# Patient Record
Sex: FEMALE | Race: WHITE | NOT HISPANIC OR LATINO | Employment: UNEMPLOYED | URBAN - METROPOLITAN AREA
[De-identification: names, ages, dates, MRNs, and addresses within clinical notes are randomized per-mention and may not be internally consistent; named-entity substitution may affect disease eponyms.]

---

## 2017-02-02 ENCOUNTER — OFFICE VISIT (OUTPATIENT)
Dept: URGENT CARE | Facility: CLINIC | Age: 32
End: 2017-02-02
Payer: COMMERCIAL

## 2017-02-02 ENCOUNTER — HOSPITAL ENCOUNTER (OUTPATIENT)
Facility: MEDICAL CENTER | Age: 32
End: 2017-02-02
Attending: PHYSICIAN ASSISTANT
Payer: COMMERCIAL

## 2017-02-02 ENCOUNTER — TELEPHONE (OUTPATIENT)
Dept: MEDICAL GROUP | Age: 32
End: 2017-02-02

## 2017-02-02 VITALS
OXYGEN SATURATION: 98 % | TEMPERATURE: 98.8 F | HEART RATE: 69 BPM | WEIGHT: 128 LBS | HEIGHT: 63 IN | DIASTOLIC BLOOD PRESSURE: 68 MMHG | SYSTOLIC BLOOD PRESSURE: 102 MMHG | RESPIRATION RATE: 14 BRPM | BODY MASS INDEX: 22.68 KG/M2

## 2017-02-02 DIAGNOSIS — J02.9 VIRAL PHARYNGITIS: ICD-10-CM

## 2017-02-02 LAB
INT CON NEG: NEGATIVE
INT CON POS: POSITIVE
S PYO AG THROAT QL: NEGATIVE

## 2017-02-02 PROCEDURE — 99203 OFFICE O/P NEW LOW 30 MIN: CPT | Performed by: PHYSICIAN ASSISTANT

## 2017-02-02 PROCEDURE — 87880 STREP A ASSAY W/OPTIC: CPT | Performed by: PHYSICIAN ASSISTANT

## 2017-02-02 PROCEDURE — 87070 CULTURE OTHR SPECIMN AEROBIC: CPT

## 2017-02-02 RX ORDER — DIPHENHYDRAMINE HYDROCHLORIDE AND LIDOCAINE HYDROCHLORIDE AND ALUMINUM HYDROXIDE AND MAGNESIUM HYDRO
5 KIT EVERY 6 HOURS PRN
Qty: 100 ML | Refills: 0 | Status: SHIPPED | OUTPATIENT
Start: 2017-02-02 | End: 2017-09-07

## 2017-02-02 ASSESSMENT — ENCOUNTER SYMPTOMS
SORE THROAT: 1
TROUBLE SWALLOWING: 1
FEVER: 0
CHILLS: 0
SPUTUM PRODUCTION: 0
SHORTNESS OF BREATH: 0
COUGH: 0
MUSCULOSKELETAL NEGATIVE: 1
DIZZINESS: 0
SWOLLEN GLANDS: 1
NAUSEA: 0
VOMITING: 0
DIARRHEA: 0
ABDOMINAL PAIN: 0

## 2017-02-02 NOTE — PROGRESS NOTES
"Subjective:      Cinthia Ta is a 31 y.o. female who presents with Sore Throat            Pharyngitis   This is a new problem. The current episode started in the past 7 days (2 days). The problem has been unchanged. The pain is worse on the right side. There has been no fever. The pain is at a severity of 3/10. The pain is mild. Associated symptoms include swollen glands and trouble swallowing. Pertinent negatives include no abdominal pain, congestion, coughing, diarrhea, ear pain, shortness of breath or vomiting. She has had no exposure to strep or mono. She has tried nothing for the symptoms.   Denies history of frequent strep infections. No sick contacts.    Review of Systems   Constitutional: Negative for fever and chills.   HENT: Positive for sore throat and trouble swallowing. Negative for congestion and ear pain.    Respiratory: Negative for cough, sputum production and shortness of breath.    Cardiovascular: Negative for chest pain.   Gastrointestinal: Negative for nausea, vomiting, abdominal pain and diarrhea.   Genitourinary: Negative.    Musculoskeletal: Negative.    Neurological: Negative for dizziness.          Objective:     /68 mmHg  Pulse 69  Temp(Src) 37.1 °C (98.8 °F)  Resp 14  Ht 1.6 m (5' 2.99\")  Wt 58.06 kg (128 lb)  BMI 22.68 kg/m2  SpO2 98%     Physical Exam   Constitutional: She is oriented to person, place, and time. She appears well-developed and well-nourished. No distress.   HENT:   Head: Normocephalic and atraumatic.   Right Ear: Hearing, tympanic membrane, external ear and ear canal normal.   Left Ear: Hearing, tympanic membrane, external ear and ear canal normal.   Nose: Nose normal.   Mouth/Throat: Posterior oropharyngeal edema and posterior oropharyngeal erythema present. No oropharyngeal exudate or tonsillar abscesses.       Elevation of buccal mucosa present on hard palate with small blister noted.   Eyes: Conjunctivae are normal. Pupils are equal, round, and " reactive to light. Right eye exhibits no discharge. Left eye exhibits no discharge.   Neck: Normal range of motion.   Cardiovascular: Normal rate, regular rhythm and normal heart sounds.    No murmur heard.  Pulmonary/Chest: Effort normal and breath sounds normal. No respiratory distress. She has no wheezes. She has no rales.   Musculoskeletal: Normal range of motion.   Lymphadenopathy:     She has cervical adenopathy.   Neurological: She is alert and oriented to person, place, and time.   Skin: Skin is warm and dry. She is not diaphoretic.   Psychiatric: She has a normal mood and affect. Her behavior is normal.   Nursing note and vitals reviewed.         Medications, Allergies, and current problem list reviewed today in Epic       Assessment/Plan:     1. Viral pharyngitis  - POCT Rapid Strep A: NEGATIVE  - DPH-Lido-AlHydr-MgHydr-Simeth (MAGIC MOUTHWASH BLM) Suspension; Take 5 mL by mouth every 6 hours as needed.  Dispense: 100 mL; Refill: 0  - CULTURE THROAT; Future  - Recommend supportive care  - Will treat pending throat culture    CULTURE THROAT   Status: Final result  Visible to patient:  Not Released  Dx:  Viral pharyngitis                5d ago       Significant Indicator NEG     Source THRT     Upper Respiratory Culture, Res Moderate growth usual upper respiratory josefa   No group A beta Streptococcus isolated.           Resulting Agency M          Specimen Collected: 02/02/17  9:26 AM     Last Resulted: 02/04/17  3:15 PM

## 2017-02-02 NOTE — Clinical Note
February 2, 2017         Patient: Cinthia Ta   YOB: 1985   Date of Visit: 2/2/2017           To Whom it May Concern:    Cinthia Ta was seen in my clinic on 2/2/2017. Please excuse her absence from 2/2/17-2/3/17.    If you have any questions or concerns, please don't hesitate to call.        Sincerely,           Luly Hamilton PA-C  Electronically Signed

## 2017-02-02 NOTE — MR AVS SNAPSHOT
"        Cinthia Cid Keyon   2017 9:00 AM   Office Visit   MRN: [id number]    Department:  [facility]   Dept Phone:  [phone]    Description:  Female : 1985   Provider:  Luly Hamilton PA-C           Reason for Visit     Sore Throat x a couple of days      Allergies as of 2017     No Known Allergies      You were diagnosed with     Viral pharyngitis   [217102]         Vital Signs     Blood Pressure Pulse Temperature Respirations Height Weight    102/68 mmHg 69 37.1 °C (98.8 °F) 14 1.6 m (5' 2.99\") 58.06 kg (128 lb)    Body Mass Index Oxygen Saturation                22.68 kg/m2 98%          Basic Information     Date Of Birth Sex Race Ethnicity Preferred Language    1985 Female White Non- English      Your appointments     2017  3:00 PM   New Patient with Jada Black M.D.   [address] [id number] [phone]           Please bring Photo ID, Insurance Cards, All Medication Bottles and copies of any legal documents (such as Living Will, Power of ) If speaking a language besides English please bring an adult . Please arrive 30 minutes prior for check in and registration. You will be receiving a confirmation call a few days before your appointment from our automated call confirmation system.              Health Maintenance     Patient has no pending health maintenance at this time      Results     POCT Rapid Strep A      Component    Rapid Strep Screen    Negative    Internal Control Positive    Positive    Internal Control Negative    Negative                        Current Immunizations     No immunizations on file.      Below and/or attached are the medications your provider expects you to take. Review all of your home medications and newly ordered medications with your provider and/or pharmacist. Follow medication instructions as directed by your provider and/or pharmacist. Please keep " your medication list with you and share with your provider. Update the information when medications are discontinued, doses are changed, or new medications (including over-the-counter products) are added; and carry medication information at all times in the event of emergency situations     Allergies:  No Known Allergies          Medications  Valid as of: February 02, 2017 -  9:57 AM    Generic Name Brand Name Tablet Size Instructions for use    DPH-Lido-AlHydr-MgHydr-Simeth (Suspension) MAGIC MOUTHWASH BLM  Take 5 mL by mouth every 6 hours as needed.        .                 Medicines prescribed today were sent to:     [facility], [place] - [address] [facility] [facility] [place]    [address] [address]    Phone: [phone] Fax: [phone]    Open 24 Hours?: No      Medication refill instructions:       If your prescription bottle indicates you have medication refills left, it is not necessary to call your provider’s office. Please contact your pharmacy and they will refill your medication.    If your prescription bottle indicates you do not have any refills left, you may request refills at any time through one of the following ways: The online [employer] system (except Urgent Care), by calling your provider’s office, or by asking your pharmacy to contact your provider’s office with a refill request. Medication refills are processed only during regular business hours and may not be available until the next business day. Your provider may request additional information or to have a follow-up visit with you prior to refilling your medication.   *Please Note: Medication refills are assigned a new Rx number when refilled electronically. Your pharmacy may indicate that no refills were authorized even though a new prescription for the same medication is available at the pharmacy. Please request the medicine by name with the pharmacy before contacting your provider for a  refill.        Your To Do List     Future Labs/Procedures Complete By Expires    CULTURE THROAT  As directed 2/2/2018         [employer] Access Code: Activation code not generated  Current [employer] Status: Active

## 2017-02-02 NOTE — TELEPHONE ENCOUNTER
Phone Number Called: [phone] (home)     Message: Patient called wanting to know if Dr. Andrade is accepting New patient and I returned the called and left message stating to patient that Dr. Andrade is not accepting New Patients.    Left Message for patient to call back: yes

## 2017-02-04 LAB
BACTERIA SPEC RESP CULT: NORMAL
SIGNIFICANT IND 70042: NORMAL
SOURCE SOURCE: NORMAL

## 2017-02-07 ENCOUNTER — TELEPHONE (OUTPATIENT)
Dept: URGENT CARE | Facility: CLINIC | Age: 32
End: 2017-02-07

## 2017-02-07 NOTE — TELEPHONE ENCOUNTER
Called patient to inform her of negative throat culture. She reports her sore throat has resolved, but she is still having soreness of the roof of her mouth. She is set up to see a new PCP in 2 days and will follow up with them for re-evaluation.

## 2017-02-09 ENCOUNTER — OFFICE VISIT (OUTPATIENT)
Dept: MEDICAL GROUP | Age: 32
End: 2017-02-09
Payer: COMMERCIAL

## 2017-02-09 VITALS
HEIGHT: 63 IN | TEMPERATURE: 98.6 F | HEART RATE: 82 BPM | OXYGEN SATURATION: 98 % | WEIGHT: 127 LBS | BODY MASS INDEX: 22.5 KG/M2 | DIASTOLIC BLOOD PRESSURE: 84 MMHG | SYSTOLIC BLOOD PRESSURE: 112 MMHG

## 2017-02-09 DIAGNOSIS — F41.1 GAD (GENERALIZED ANXIETY DISORDER): ICD-10-CM

## 2017-02-09 DIAGNOSIS — Z30.41 ENCOUNTER FOR SURVEILLANCE OF CONTRACEPTIVE PILLS: ICD-10-CM

## 2017-02-09 DIAGNOSIS — F45.8 BRUXISM (TEETH GRINDING): ICD-10-CM

## 2017-02-09 DIAGNOSIS — F41.8 SITUATIONAL ANXIETY: ICD-10-CM

## 2017-02-09 DIAGNOSIS — R19.8 CLENCHING OF TEETH: ICD-10-CM

## 2017-02-09 DIAGNOSIS — Z87.42 HX OF ABNORMAL CERVICAL PAP SMEAR: ICD-10-CM

## 2017-02-09 PROCEDURE — 99214 OFFICE O/P EST MOD 30 MIN: CPT | Performed by: FAMILY MEDICINE

## 2017-02-09 RX ORDER — PROPRANOLOL HYDROCHLORIDE 20 MG/1
20 TABLET ORAL 3 TIMES DAILY
Qty: 90 TAB | Refills: 0 | Status: SHIPPED | OUTPATIENT
Start: 2017-02-09 | End: 2017-02-09 | Stop reason: SDUPTHER

## 2017-02-09 RX ORDER — CYCLOBENZAPRINE HCL 5 MG
5 TABLET ORAL
Qty: 30 TAB | Refills: 0 | Status: SHIPPED | OUTPATIENT
Start: 2017-02-09 | End: 2017-03-07 | Stop reason: SDUPTHER

## 2017-02-09 RX ORDER — PROPRANOLOL HYDROCHLORIDE 20 MG/1
20 TABLET ORAL
Qty: 90 TAB | Refills: 0 | Status: SHIPPED | OUTPATIENT
Start: 2017-02-09 | End: 2017-09-07

## 2017-02-09 RX ORDER — ALPRAZOLAM 0.25 MG/1
0.25 TABLET ORAL
Qty: 30 TAB | Refills: 0 | Status: SHIPPED | OUTPATIENT
Start: 2017-02-09 | End: 2017-05-01 | Stop reason: SDUPTHER

## 2017-02-09 RX ORDER — ALPRAZOLAM 0.25 MG/1
0.25 TABLET ORAL
COMMUNITY
End: 2017-02-09 | Stop reason: SDUPTHER

## 2017-02-09 ASSESSMENT — PATIENT HEALTH QUESTIONNAIRE - PHQ9: CLINICAL INTERPRETATION OF PHQ2 SCORE: 0

## 2017-02-09 NOTE — MR AVS SNAPSHOT
"        Cinthia Ta   2017 3:00 PM   Office Visit   MRN: [id number]    Department:  [address] [place] [address]   Dept Phone:  [phone]    Description:  Female : 1985   Provider:  Jada Black M.D.           Reason for Visit     Establish Care puss on roof of mouth went to  and still not feeling better       Allergies as of 2017     No Known Allergies      You were diagnosed with     Encounter for surveillance of contraceptive pills   [526042]       JUAN MIGUEL (generalized anxiety disorder)   [991844]       Situational anxiety   [124969]       Clenching of teeth   [0249559]       Bruxism (teeth grinding)   [370949]       Hx of abnormal cervical Pap smear   [135370]         Vital Signs     Blood Pressure Pulse Temperature Height Weight Body Mass Index    112/84 mmHg 82 37 °C (98.6 °F) 1.6 m (5' 2.99\") 57.607 kg (127 lb) 22.50 kg/m2    Oxygen Saturation Last Menstrual Period Breastfeeding? Smoking Status          98% 2017 No Never Smoker         Basic Information     Date Of Birth Sex Race Ethnicity Preferred Language    1985 Female White Non- English      Your appointments     May 09, 2017  4:00 PM   Established Patient with Jada Black M.D.   [address] [address] [phone]           You will be receiving a confirmation call a few days before your appointment from our automated call confirmation system.              Problem List              ICD-10-CM Priority Class Noted - Resolved    Encounter for surveillance of contraceptive pills Z30.41   2017 - Present    JUAN MIGUEL (generalized anxiety disorder) F41.1   2017 - Present    Situational anxiety F41.8   2017 - Present    Clenching of teeth R19.8   2017 - Present    Bruxism (teeth grinding) F45.8   2017 - Present    Hx of abnormal cervical Pap smear Z87.898   2017 - Present      Health Maintenance        Date Due Completion Dates    IMM DTaP/Tdap/Td " Vaccine (1 - Tdap) 7/18/2005 ---    PAP SMEAR 7/18/2007 ---    IMM INFLUENZA (1) 9/1/2016 ---            Current Immunizations     No immunizations on file.      Below and/or attached are the medications your provider expects you to take. Review all of your home medications and newly ordered medications with your provider and/or pharmacist. Follow medication instructions as directed by your provider and/or pharmacist. Please keep your medication list with you and share with your provider. Update the information when medications are discontinued, doses are changed, or new medications (including over-the-counter products) are added; and carry medication information at all times in the event of emergency situations     Allergies:  No Known Allergies          Medications  Valid as of: February 09, 2017 -  3:51 PM    Generic Name Brand Name Tablet Size Instructions for use    ALPRAZolam (Tab) XANAX 0.25 MG Take 1 Tab by mouth 1 time daily as needed for Anxiety.        Cyclobenzaprine HCl (Tab) FLEXERIL 5 MG Take 1 Tab by mouth every bedtime.        DPH-Lido-AlHydr-MgHydr-Simeth (Suspension) MAGIC MOUTHWASH BLM  Take 5 mL by mouth every 6 hours as needed.        Propranolol HCl (Tab) INDERAL 20 MG Take 1 Tab by mouth 1 time daily as needed.        .                 Medicines prescribed today were sent to:     [id number] - [place] - [address] [facility] [address]    [address] [place] [address]    Phone: [phone] Fax: [phone]    Open 24 Hours?: No      Medication refill instructions:       If your prescription bottle indicates you have medication refills left, it is not necessary to call your provider’s office. Please contact your pharmacy and they will refill your medication.    If your prescription bottle indicates you do not have any refills left, you may request refills at any time through one of the following ways: The online [employer] system (except Urgent  Care), by calling your provider’s office, or by asking your pharmacy to contact your provider’s office with a refill request. Medication refills are processed only during regular business hours and may not be available until the next business day. Your provider may request additional information or to have a follow-up visit with you prior to refilling your medication.   *Please Note: Medication refills are assigned a new Rx number when refilled electronically. Your pharmacy may indicate that no refills were authorized even though a new prescription for the same medication is available at the pharmacy. Please request the medicine by name with the pharmacy before contacting your provider for a refill.           [employer] Access Code: Activation code not generated  Current [employer] Status: Active

## 2017-02-09 NOTE — PROGRESS NOTES
Subjective:   CC: establish care, JUAN MIGUEL    HPI:     Cinthia Ta is a 31 y.o. female, established patient of the clinic, presents with the following concerns:     1. Encounter for surveillance of contraceptive pills  Sexually active, , current engaged to be  in 10/2017.   Using OCPs for contraception for the past 15 years.   Denies hx of tobacco abuse, CVD, CVA, clotting disorders.   Does not remember the name of her OCPs.     2. JUAN MIGUEL (generalized anxiety disorder)  Chronic, in remission, except for occasional situational anxiety.   Used to take Xanax for anxiety, but has not needed Xanax for over a year.     3. Situational anxiety  C/o flushing, chest palpitation, and diaphoresis with public speaking in front of a large crowd, which interferering with her employment. 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     4. Clenching of teeth  5. Bruxism (teeth grinding)  Hx of nocturnal bruxism and clenching of the teeth secondary to anxiety.   Pt was seen by dentist, who plans to create a mouth guard for patient.   Pt's dentist prescribed Flexeril to relax jaw muscles at night, which appears to work well for patient.   She's interested in continuing Flexeril as it does help with her symptoms.     6. Hx of abnormal cervical Pap smear  Hx of abnormal pap smear in  s/p colposcopy, which was negative.   Subsequent pap has been normal.     7. Oral Ulcer:   Pt develops a small oral ulcer on the roof of her mouth couple days ago.   She went to urgent care and was told to use OTC mouth wash.   The lesion appears to be healing well.    Current medicines (including changes today)  Current Outpatient Prescriptions   Medication Sig Dispense Refill   • alprazolam (XANAX) 0.25 MG Tab Take 1 Tab by mouth 1 time daily as needed for Anxiety. 30 Tab 0   • cyclobenzaprine (FLEXERIL) 5 MG tablet Take 1 Tab by mouth every bedtime. 30 Tab 0   • propranolol (INDERAL) 20 MG Tab Take 1 Tab by mouth 1 time daily as needed.  "90 Tab 0   • DPH-Lido-AlHydr-MgHydr-Simeth (MAGIC MOUTHWASH BLM) Suspension Take 5 mL by mouth every 6 hours as needed. 100 mL 0     No current facility-administered medications for this visit.     She  has no past medical history on file.    I personally reviewed patient's problem list, allergies, medications, family hx, social hx with patient and update EPIC.     REVIEW OF SYSTEMS:  CONSTITUTIONAL:  Denies night sweats, fatigue, malaise, lethargy, fever or chills.  RESPIRATORY:  Denies cough, wheeze, hemoptysis, or shortness of breath.  CARDIOVASCULAR:  Denies chest pains, palpitations, pedal edema  GASTROINTESTINAL:  Denies abdominal pain, nausea or vomiting, diarrhea, constipation, hematemesis, hematochezia, melena.     Objective:     Blood pressure 112/84, pulse 82, temperature 37 °C (98.6 °F), height 1.6 m (5' 2.99\"), weight 57.607 kg (127 lb), last menstrual period 02/05/2017, SpO2 98 %, not currently breastfeeding. Body mass index is 22.5 kg/(m^2).    Physical Exam:  Constitutional: awake, alert, in no distress.  Skin: Warm, dry, good turgor, no rashes, bruises, ulcers in visible areas.  Eye: conjunctiva clear, lids neg for edema or lesions.  ENMT: TM and auditory canals wnl. Oral and nasal mucosa wnl. Lips without lesions, good dentition, oropharynx clear.  - 0.2 x 1 cm superficial ulceration on the hard palate, neg exudate. The rest of oral mucosa exam was normal.   Neck: Trachea midline, no masses, no thyromegaly. No cervical or supraclavicular lymphadenopathy  Respiratory: Unlabored respiratory effort, lungs clear to auscultation, no wheezes, no ronchi.  Cardiovascular: Normal S1, S2, no murmur, no pedal edema.  Abdomen: Soft, non-tender to palpation, no hernia, no hepatosplenomegaly.  Neuro: CN2-12 grossly intact, no sensory deficits.   Psych: Oriented x3, affect and mood wnl, intact judgement and insight.       Assessment and Plan:   The following treatment plan was discussed    1. Encounter for " surveillance of contraceptive pills  Sexually active, , current engaged to be  in 10/2017.   Using OCPs for contraception for the past 15 years.   Denies hx of tobacco abuse, CVD, CVA, clotting disorders.   Does not remember the name of her OCPs. Plan:   - continue OCP, call for refill    2. JUAN MIGUEL (generalized anxiety disorder)  Chronic, used to take Xanax for anxiety flares, tried Prozac in the past but ineffective, currently in remission, has not needed Xanax for 12+ months. Plan:   - alprazolam (XANAX) 0.25 MG Tab; Take 1 Tab by mouth 1 time daily as needed for Anxiety.  Dispense: 30 Tab; Refill: 0    3. Situational anxiety  C/o facial/chest flushing, chest palpitation, diaphoresis prior to public speaking, which interfering with her job. Plan:   - propranolol (INDERAL) 20 MG Tab; Take 1 Tab by mouth 1 time daily as needed.  Dispense: 90 Tab; Refill: 0  - f/u PRN    4. Clenching of teeth  5. Bruxism (teeth grinding)  Hx of nocturnal bruxism and clenching of the teeth secondary to anxiety.   Pt was seen by dentist, who plans to create a mouth guard for patient.   Pt's dentist prescribed Flexeril to relax jaw muscles at night, which appears to work well for patient.   She's interested in continuing Flexeril as it does help with her symptoms. Plan:   - cyclobenzaprine (FLEXERIL) 5 MG tablet; Take 1 Tab by mouth every bedtime.  Dispense: 30 Tab; Refill: 0  - side effects discussed with patient.     6. Hx of abnormal cervical Pap smear  Hx of abnormal pap smear in  s/p colposcopy, which was negative.   Subsequent pap has been normal.  Plan: will request records from previous PCP    7. Aphthous ulcer  Exam noted for small superficial ulcer on the hard palate, appears to heal well according to patient. Plan:   - Orajel  - mouth wash  - f/u as needed.     Jada Black M.D.      Followup: Return in about 3 months (around 2017) for Long, Multiple issues.

## 2017-02-09 NOTE — Clinical Note
[facility]  Jada Black M.D.  [address] [place] [address]  Fax: [phone] Authorization for Release/Disclosure of Protected Health Information   Name: EMMETT TA : 1985 SSN: [id number]   Address: [address] [place] [address] [address] [place] [address] Phone:    [phone] (home)    I authorize the entity listed below to release/disclose the PHI below to [facility]/Jada Black M.D.   Provider or Entity Name:    Dr. Gamble    Address   City, [facility] Phone:      Fax:     Reason for request: continuity of care   Information to be released:    [  ] LAST COLONOSCOPY, including any PATH REPORT [  ] LAST DEXA  [  ] LAST MAMMOGRAM  [  ] LAST PAP [  ] RETINA EXAM REPORT  [  ] IMMUNIZATION RECORDS  [ x ] Release all info      [  ] Check here and initial the line next to each item to release ALL health information INCLUDING  _____ Care and treatment for drug and / or alcohol abuse  _____ HIV testing, infection status, or AIDS  _____ Genetic Testing    DATES OF SERVICE OR TIME PERIOD TO BE DISCLOSED: _____________  I understand and acknowledge that:  * This Authorization may be revoked at any time by you in writing, except if your health information has already been used or disclosed.  * Your health information that will be used or disclosed as a result of you signing this authorization could be re-disclosed by the recipient. If this occurs, your re-disclosed health information may no longer be protected by State or Federal laws.  * You may refuse to sign this Authorization. Your refusal will not affect your ability to obtain treatment.  * This Authorization becomes effective upon signing and will  on (date) __________. If no date is indicated, this Authorization will  one (1) year from the signature date.    Name: Emmett Ta    Signature:     Date: 2017

## 2017-03-07 RX ORDER — CYCLOBENZAPRINE HCL 5 MG
TABLET ORAL
Qty: 30 TAB | Refills: 0 | Status: SHIPPED | OUTPATIENT
Start: 2017-03-07 | End: 2017-05-01 | Stop reason: SDUPTHER

## 2017-05-01 DIAGNOSIS — F41.1 GAD (GENERALIZED ANXIETY DISORDER): ICD-10-CM

## 2017-05-01 RX ORDER — CYCLOBENZAPRINE HCL 5 MG
TABLET ORAL
Qty: 30 TAB | Refills: 0 | Status: CANCELLED | OUTPATIENT
Start: 2017-05-01

## 2017-05-01 NOTE — TELEPHONE ENCOUNTER
Phone Number Called: [phone] (home)     Message: returning pt vm regarding refills.  Left message for patient to return call.    Left Message for patient to call back: yes

## 2017-05-01 NOTE — TELEPHONE ENCOUNTER
----- Message from Kaylene Thakur sent at 4/26/2017  3:11 PM PDT -----  Pt called office has an appt schedule 5/9/17, pt doesn't need to see dr she is feeling fine, does pt still need to come in, pt does need to get refill on muscle relaxer  called into pharmacy. Please call pt and let her know if she needs to come in  Thank you

## 2017-05-01 NOTE — TELEPHONE ENCOUNTER
Was the patient seen in the last year in this department? Yes     Does patient have an active prescription for medications requested? No     Received Request Via: Patient

## 2017-05-02 RX ORDER — CYCLOBENZAPRINE HCL 5 MG
TABLET ORAL
Qty: 30 TAB | Refills: 0 | Status: SHIPPED | OUTPATIENT
Start: 2017-05-02 | End: 2017-08-26 | Stop reason: SDUPTHER

## 2017-05-02 RX ORDER — ALPRAZOLAM 0.25 MG/1
0.25 TABLET ORAL
Qty: 30 TAB | Refills: 0 | Status: SHIPPED
Start: 2017-05-02 | End: 2017-08-26 | Stop reason: SDUPTHER

## 2017-05-02 NOTE — TELEPHONE ENCOUNTER
Phone Number Called: [phone] (home)     Message: called pt and notified of message below, pt will contact previous doctor to get a copy of papsmear before scheduling appt.     Left Message for patient to call back: no

## 2017-05-02 NOTE — TELEPHONE ENCOUNTER
rx faxed to   [facility] [place] [address] - [place] - [address]  [address] [place] [address]  Phone: [phone] Fax: [phone]

## 2017-05-02 NOTE — TELEPHONE ENCOUNTER
Refill approved, Rx sent electronically to pharmacy.   We requested records from her previous PCP since 2/16/2017, but did not receive anything. Therefore, I recommend scheduling an appointment for repeat pap smear.   Please inform patient.   Jada Black M.D.

## 2017-06-20 ENCOUNTER — OFFICE VISIT (OUTPATIENT)
Dept: MEDICAL GROUP | Age: 32
End: 2017-06-20
Payer: COMMERCIAL

## 2017-06-20 ENCOUNTER — HOSPITAL ENCOUNTER (OUTPATIENT)
Facility: MEDICAL CENTER | Age: 32
End: 2017-06-20
Attending: FAMILY MEDICINE
Payer: COMMERCIAL

## 2017-06-20 VITALS
HEART RATE: 105 BPM | WEIGHT: 127.6 LBS | TEMPERATURE: 99.3 F | SYSTOLIC BLOOD PRESSURE: 115 MMHG | OXYGEN SATURATION: 93 % | BODY MASS INDEX: 21.78 KG/M2 | DIASTOLIC BLOOD PRESSURE: 60 MMHG | HEIGHT: 64 IN

## 2017-06-20 DIAGNOSIS — Z11.51 SPECIAL SCREENING EXAMINATION FOR HUMAN PAPILLOMAVIRUS (HPV): ICD-10-CM

## 2017-06-20 DIAGNOSIS — Z01.419 PAP SMEAR, LOW-RISK: ICD-10-CM

## 2017-06-20 DIAGNOSIS — N76.0 ACUTE VAGINITIS: ICD-10-CM

## 2017-06-20 DIAGNOSIS — Z12.4 ROUTINE CERVICAL SMEAR: ICD-10-CM

## 2017-06-20 DIAGNOSIS — Z01.419 WELL WOMAN EXAM: Primary | ICD-10-CM

## 2017-06-20 DIAGNOSIS — B37.9 YEAST INFECTION: ICD-10-CM

## 2017-06-20 PROCEDURE — 87480 CANDIDA DNA DIR PROBE: CPT

## 2017-06-20 PROCEDURE — 87510 GARDNER VAG DNA DIR PROBE: CPT

## 2017-06-20 PROCEDURE — 87624 HPV HI-RISK TYP POOLED RSLT: CPT

## 2017-06-20 PROCEDURE — 99395 PREV VISIT EST AGE 18-39: CPT | Performed by: FAMILY MEDICINE

## 2017-06-20 PROCEDURE — 88175 CYTOPATH C/V AUTO FLUID REDO: CPT

## 2017-06-20 PROCEDURE — 87660 TRICHOMONAS VAGIN DIR PROBE: CPT

## 2017-06-20 PROCEDURE — 87491 CHLMYD TRACH DNA AMP PROBE: CPT

## 2017-06-20 PROCEDURE — 87591 N.GONORRHOEAE DNA AMP PROB: CPT

## 2017-06-20 PROCEDURE — 99000 SPECIMEN HANDLING OFFICE-LAB: CPT | Performed by: FAMILY MEDICINE

## 2017-06-20 RX ORDER — FLUCONAZOLE 150 MG/1
150 TABLET ORAL DAILY
Qty: 5 TAB | Refills: 0 | Status: SHIPPED | OUTPATIENT
Start: 2017-06-20 | End: 2017-06-21

## 2017-06-20 NOTE — PROGRESS NOTES
Subjective:   CC: well women exam, pap    HPI:     Cinthia Ta is a 31 y.o. female, established patient of the clinic, presents with the following concerns:     , sexually active with male partner, her .   Contraception: OCP,  had vasectomy  Hx of STD: negative  Hx of abnormal pap: negative   LMP: secondary amenorrhea from OCP.    Denies fever, chills, pelvic pain, dyspareunia,genital rash.   Pt c/o mild vaginal itch/irrigation with white discharge. She endorses hx of recurrent BV or yeast infection from excessive sweating.   Denies nipp le bleeding, discharge, breast mass, familial/personal hx of breast/gyn malignancy.     Current medicines (including changes today)  Current Outpatient Prescriptions   Medication Sig Dispense Refill   • fluconazole (DIFLUCAN) 150 MG tablet Take 1 Tab by mouth every day for 1 dose. 5 Tab 0   • cyclobenzaprine (FLEXERIL) 5 MG tablet TAKE 1 TABLET BY MOUTH EVERY NIGHT AT BEDTIME 30 Tab 0   • alprazolam (XANAX) 0.25 MG Tab Take 1 Tab by mouth 1 time daily as needed for Anxiety. 30 Tab 0   • propranolol (INDERAL) 20 MG Tab Take 1 Tab by mouth 1 time daily as needed. 90 Tab 0   • DPH-Lido-AlHydr-MgHydr-Simeth (MAGIC MOUTHWASH BLM) Suspension Take 5 mL by mouth every 6 hours as needed. (Patient not taking: Reported on 2017) 100 mL 0     No current facility-administered medications for this visit.     She  has no past medical history on file.    I personally reviewed patient's problem list, allergies, medications, family hx, social hx with patient and update [facility].     REVIEW OF SYSTEMS:  CONSTITUTIONAL:  Denies night sweats, fatigue, malaise, lethargy, fever or chills.  RESPIRATORY:  Denies cough, wheeze, hemoptysis, or shortness of breath.  CARDIOVASCULAR:  Denies chest pains, palpitations, pedal edema  GASTROINTESTINAL:  Denies abdominal pain, nausea or vomiting, diarrhea, constipation, hematemesis, hematochezia, melena.  GENITOURINARY:  Denies urinary  "urgency, frequency, dysuria, or hematuria.         Objective:     Blood pressure 115/60, pulse 105, temperature 37.4 °C (99.3 °F), height 1.613 m (5' 3.5\"), weight 57.879 kg (127 lb 9.6 oz), SpO2 93 %. Body mass index is 22.25 kg/(m^2).    Physical Exam:  Constitutional: awake, alert, in no distress.  Skin: Warm, dry, good turgor, no rashes, bruises, ulcers in visible areas.  Respiratory: Unlabored respiratory effort, lungs clear to auscultation, no wheezes, no rhonchi.  Cardiovascular: Normal S1, S2, no murmur, no pedal edema.  Abdomen: Soft, non-tender to palpation, no hernia, no hepatosplenomegaly.  Neuro: CN2-12 grossly intact.    Psych: Oriented x3, affect and mood wnl, intact judgement and insight.   GYN: Unremarkable external genitalia. Mild whitish vaginal discharge w/o foul odor, no vaginal bleeding bleeding, no vaginal rash, cervix in mid position, no concerning lesions on cervix, no cervical motion tenderness, uterus mid position with normal size & shape, no adnexal fullness/mass appreciated on bimanual exam.    Assessment and Plan:   The following treatment plan was discussed    1. Pap smear, low-risk  - THINPREP PAP W/HPV AND CTNG; Future    2.  Acute vaginitis-  - VAGINAL PATHOGENS DNA PANEL; Future  - Diflucan   - f/u PRN.     Jaad Black M.D.      Followup: Return in about 1 year (around 6/20/2018) for Annual wellness visit.    Please note that this dictation was created using voice recognition software. I have made every reasonable attempt to correct obvious errors, but I expect that there are errors of grammar and possibly content that I did not discover before finalizing the note.             "

## 2017-06-20 NOTE — MR AVS SNAPSHOT
"        Cinthia Ta   2017 1:40 PM   Office Visit   MRN: [id number]    Department:  [address] [place] [address]   Dept Phone:  [phone]    Description:  Female : 1985   Provider:  Jada Black M.D.           Reason for Visit     Gynecologic Exam possible bacterial vaginosis      Allergies as of 2017     No Known Allergies      You were diagnosed with     Well woman exam   [437849]  -  Primary     Pap smear, low-risk   [262154]       Routine cervical smear   [103523]       Special screening examination for human papillomavirus (HPV)   [V73.81.ICD-9-CM]       Yeast infection   [720308]       Acute vaginitis   [994549]         Vital Signs     Blood Pressure Pulse Temperature Height Weight Body Mass Index    115/60 mmHg 105 37.4 °C (99.3 °F) 1.613 m (5' 3.5\") 57.879 kg (127 lb 9.6 oz) 22.25 kg/m2    Oxygen Saturation Smoking Status                93% Never Smoker           Basic Information     Date Of Birth Sex Race Ethnicity Preferred Language    1985 Female White Non- English      Problem List              ICD-10-CM Priority Class Noted - Resolved    Encounter for surveillance of contraceptive pills Z30.41   2017 - Present    JUAN MIGUEL (generalized anxiety disorder) F41.1   2017 - Present    Situational anxiety F41.8   2017 - Present    Clenching of teeth R19.8   2017 - Present    Bruxism (teeth grinding) F45.8   2017 - Present    Hx of abnormal cervical Pap smear Z87.898   2017 - Present      Health Maintenance        Date Due Completion Dates    IMM DTaP/Tdap/Td Vaccine (1 - Tdap) 2005 ---    PAP SMEAR 2007 ---            Current Immunizations     No immunizations on file.      Below and/or attached are the medications your provider expects you to take. Review all of your home medications and newly ordered medications with your provider and/or pharmacist. Follow medication instructions as directed by your provider and/or pharmacist. Please keep your " medication list with you and share with your provider. Update the information when medications are discontinued, doses are changed, or new medications (including over-the-counter products) are added; and carry medication information at all times in the event of emergency situations     Allergies:  No Known Allergies          Medications  Valid as of: June 20, 2017 -  2:18 PM    Generic Name Brand Name Tablet Size Instructions for use    ALPRAZolam (Tab) XANAX 0.25 MG Take 1 Tab by mouth 1 time daily as needed for Anxiety.        Cyclobenzaprine HCl (Tab) FLEXERIL 5 MG TAKE 1 TABLET BY MOUTH EVERY NIGHT AT BEDTIME        DPH-Lido-AlHydr-MgHydr-Simeth (Suspension) MAGIC MOUTHWASH BLM  Take 5 mL by mouth every 6 hours as needed.        Fluconazole (Tab) DIFLUCAN 150 MG Take 1 Tab by mouth every day for 1 dose.        Propranolol HCl (Tab) INDERAL 20 MG Take 1 Tab by mouth 1 time daily as needed.        .                 Medicines prescribed today were sent to:     [facility] [address] [place] [address] - [address] [facility]    [address] [facility] [address]    Phone: [phone] Fax: [phone]    Open 24 Hours?: No      Medication refill instructions:       If your prescription bottle indicates you have medication refills left, it is not necessary to call your provider’s office. Please contact your pharmacy and they will refill your medication.    If your prescription bottle indicates you do not have any refills left, you may request refills at any time through one of the following ways: The online [employer] system (except Urgent Care), by calling your provider’s office, or by asking your pharmacy to contact your provider’s office with a refill request. Medication refills are processed only during regular business hours and may not be available until the next business day. Your provider may request additional information or to have a follow-up visit with you prior to refilling  your medication.   *Please Note: Medication refills are assigned a new Rx number when refilled electronically. Your pharmacy may indicate that no refills were authorized even though a new prescription for the same medication is available at the pharmacy. Please request the medicine by name with the pharmacy before contacting your provider for a refill.        Your To Do List     Future Labs/Procedures Complete By Expires    THINPREP PAP W/HPV AND CTNG  As directed 6/21/2018    VAGINAL PATHOGENS DNA PANEL  As directed 6/21/2018         [employer] Access Code: Activation code not generated  Current [employer] Status: Active

## 2017-06-21 LAB
C TRACH DNA GENITAL QL NAA+PROBE: NEGATIVE
CANDIDA DNA VAG QL PROBE+SIG AMP: POSITIVE
CYTOLOGY REG CYTOL: NORMAL
G VAGINALIS DNA VAG QL PROBE+SIG AMP: NEGATIVE
HPV HR 12 DNA CVX QL NAA+PROBE: NEGATIVE
HPV16 DNA SPEC QL NAA+PROBE: NEGATIVE
HPV18 DNA SPEC QL NAA+PROBE: NEGATIVE
N GONORRHOEA DNA GENITAL QL NAA+PROBE: NEGATIVE
SPECIMEN SOURCE: NORMAL
SPECIMEN SOURCE: NORMAL
T VAGINALIS DNA VAG QL PROBE+SIG AMP: NEGATIVE

## 2017-08-26 DIAGNOSIS — F41.1 GAD (GENERALIZED ANXIETY DISORDER): ICD-10-CM

## 2017-08-28 RX ORDER — ALPRAZOLAM 0.25 MG/1
TABLET ORAL
Qty: 30 TAB | Refills: 0 | Status: SHIPPED
Start: 2017-08-28 | End: 2017-09-21 | Stop reason: SDUPTHER

## 2017-08-28 RX ORDER — CYCLOBENZAPRINE HCL 5 MG
TABLET ORAL
Qty: 30 TAB | Refills: 0 | Status: SHIPPED | OUTPATIENT
Start: 2017-08-28 | End: 2017-09-21 | Stop reason: SDUPTHER

## 2017-09-07 ENCOUNTER — OFFICE VISIT (OUTPATIENT)
Dept: MEDICAL GROUP | Age: 32
End: 2017-09-07
Payer: COMMERCIAL

## 2017-09-07 VITALS
BODY MASS INDEX: 22.36 KG/M2 | TEMPERATURE: 97.6 F | SYSTOLIC BLOOD PRESSURE: 115 MMHG | HEART RATE: 85 BPM | OXYGEN SATURATION: 94 % | WEIGHT: 126.2 LBS | DIASTOLIC BLOOD PRESSURE: 80 MMHG | HEIGHT: 63 IN

## 2017-09-07 DIAGNOSIS — Z00.00 PE (PHYSICAL EXAM), ANNUAL: ICD-10-CM

## 2017-09-07 DIAGNOSIS — F41.1 GAD (GENERALIZED ANXIETY DISORDER): Primary | ICD-10-CM

## 2017-09-07 DIAGNOSIS — F32.1 MODERATE SINGLE CURRENT EPISODE OF MAJOR DEPRESSIVE DISORDER (HCC): ICD-10-CM

## 2017-09-07 PROCEDURE — 99214 OFFICE O/P EST MOD 30 MIN: CPT | Performed by: FAMILY MEDICINE

## 2017-09-07 RX ORDER — CITALOPRAM HYDROBROMIDE 10 MG/1
10 TABLET ORAL DAILY
Qty: 90 TAB | Refills: 0 | Status: SHIPPED | OUTPATIENT
Start: 2017-09-07 | End: 2017-09-22

## 2017-09-08 ENCOUNTER — HOSPITAL ENCOUNTER (OUTPATIENT)
Dept: LAB | Facility: MEDICAL CENTER | Age: 32
End: 2017-09-08
Attending: FAMILY MEDICINE
Payer: COMMERCIAL

## 2017-09-08 DIAGNOSIS — Z00.00 PE (PHYSICAL EXAM), ANNUAL: ICD-10-CM

## 2017-09-08 LAB
25(OH)D3 SERPL-MCNC: 32 NG/ML (ref 30–100)
ALBUMIN SERPL BCP-MCNC: 4 G/DL (ref 3.2–4.9)
ALBUMIN/GLOB SERPL: 1.3 G/DL
ALP SERPL-CCNC: 36 U/L (ref 30–99)
ALT SERPL-CCNC: 10 U/L (ref 2–50)
ANION GAP SERPL CALC-SCNC: 7 MMOL/L (ref 0–11.9)
AST SERPL-CCNC: 16 U/L (ref 12–45)
BASOPHILS # BLD AUTO: 1.1 % (ref 0–1.8)
BASOPHILS # BLD: 0.06 K/UL (ref 0–0.12)
BILIRUB SERPL-MCNC: 0.8 MG/DL (ref 0.1–1.5)
BUN SERPL-MCNC: 14 MG/DL (ref 8–22)
CALCIUM SERPL-MCNC: 9.2 MG/DL (ref 8.5–10.5)
CHLORIDE SERPL-SCNC: 108 MMOL/L (ref 96–112)
CHOLEST SERPL-MCNC: 168 MG/DL (ref 100–199)
CO2 SERPL-SCNC: 24 MMOL/L (ref 20–33)
CREAT SERPL-MCNC: 0.69 MG/DL (ref 0.5–1.4)
CREAT UR-MCNC: 247.2 MG/DL
EOSINOPHIL # BLD AUTO: 0.14 K/UL (ref 0–0.51)
EOSINOPHIL NFR BLD: 2.5 % (ref 0–6.9)
ERYTHROCYTE [DISTWIDTH] IN BLOOD BY AUTOMATED COUNT: 42.2 FL (ref 35.9–50)
GFR SERPL CREATININE-BSD FRML MDRD: >60 ML/MIN/1.73 M 2
GLOBULIN SER CALC-MCNC: 3.1 G/DL (ref 1.9–3.5)
GLUCOSE SERPL-MCNC: 73 MG/DL (ref 65–99)
HCT VFR BLD AUTO: 42.8 % (ref 37–47)
HDLC SERPL-MCNC: 49 MG/DL
HGB BLD-MCNC: 14 G/DL (ref 12–16)
IMM GRANULOCYTES # BLD AUTO: 0.01 K/UL (ref 0–0.11)
IMM GRANULOCYTES NFR BLD AUTO: 0.2 % (ref 0–0.9)
LDLC SERPL CALC-MCNC: 106 MG/DL
LYMPHOCYTES # BLD AUTO: 1.65 K/UL (ref 1–4.8)
LYMPHOCYTES NFR BLD: 28.9 % (ref 22–41)
MCH RBC QN AUTO: 30.5 PG (ref 27–33)
MCHC RBC AUTO-ENTMCNC: 32.7 G/DL (ref 33.6–35)
MCV RBC AUTO: 93.2 FL (ref 81.4–97.8)
MICROALBUMIN UR-MCNC: 1.4 MG/DL
MICROALBUMIN/CREAT UR: 6 MG/G (ref 0–30)
MONOCYTES # BLD AUTO: 0.24 K/UL (ref 0–0.85)
MONOCYTES NFR BLD AUTO: 4.2 % (ref 0–13.4)
NEUTROPHILS # BLD AUTO: 3.61 K/UL (ref 2–7.15)
NEUTROPHILS NFR BLD: 63.1 % (ref 44–72)
NRBC # BLD AUTO: 0 K/UL
NRBC BLD AUTO-RTO: 0 /100 WBC
PLATELET # BLD AUTO: 153 K/UL (ref 164–446)
PMV BLD AUTO: 11.9 FL (ref 9–12.9)
POTASSIUM SERPL-SCNC: 4.2 MMOL/L (ref 3.6–5.5)
PROT SERPL-MCNC: 7.1 G/DL (ref 6–8.2)
RBC # BLD AUTO: 4.59 M/UL (ref 4.2–5.4)
SODIUM SERPL-SCNC: 139 MMOL/L (ref 135–145)
TRIGL SERPL-MCNC: 67 MG/DL (ref 0–149)
TSH SERPL DL<=0.005 MIU/L-ACNC: 1.55 UIU/ML (ref 0.3–3.7)
WBC # BLD AUTO: 5.7 K/UL (ref 4.8–10.8)

## 2017-09-08 PROCEDURE — 36415 COLL VENOUS BLD VENIPUNCTURE: CPT

## 2017-09-08 PROCEDURE — 85025 COMPLETE CBC W/AUTO DIFF WBC: CPT

## 2017-09-08 PROCEDURE — 80053 COMPREHEN METABOLIC PANEL: CPT

## 2017-09-08 PROCEDURE — 80061 LIPID PANEL: CPT

## 2017-09-08 PROCEDURE — 84443 ASSAY THYROID STIM HORMONE: CPT

## 2017-09-08 PROCEDURE — 82306 VITAMIN D 25 HYDROXY: CPT

## 2017-09-08 PROCEDURE — 82570 ASSAY OF URINE CREATININE: CPT

## 2017-09-08 PROCEDURE — 82043 UR ALBUMIN QUANTITATIVE: CPT

## 2017-09-09 NOTE — PROGRESS NOTES
"Subjective:   CC: anxiety    HPI:     Cinthia Ta is a 32 y.o. female, established patient of the clinic, presents with the following concerns:     Pt has hx of chronic anxiety for years, previously under good control with intermittent Xanax.   She recently moved to [place] and planning to get  in 4 weeks.   She works as manager of local hotel. She reports stressful work environment.   Pt c/o worsening anxiety, stress, mood instability, excessive crying for unclear reasons.   Her fiance and family are very supportive. She tried different relaxation methods and uses Xanax more frequently over the past few weeks.   However, her symptoms are persistent leading to poor sleep, poor appetite, depressed mood, low motivation, and hypersomnolence.   She tried therapy in the past, which was very helpful.     Current medicines (including changes today)  Current Outpatient Prescriptions   Medication Sig Dispense Refill   • citalopram (CELEXA) 10 MG tablet Take 1 Tab by mouth every day. 90 Tab 0   • cyclobenzaprine (FLEXERIL) 5 MG tablet TAKE 1 TABLET BY MOUTH EVERY NIGHT AT BEDTIME 30 Tab 0   • alprazolam (XANAX) 0.25 MG Tab TAKE 1 TABLET BY MOUTH ONCE DAILY AS NEEDED FOR ANXIETY 30 Tab 0     No current facility-administered medications for this visit.      She  has no past medical history on file.    I personally reviewed patient's problem list, allergies, medications, family hx, social hx with patient and update EPIC.     REVIEW OF SYSTEMS:  CONSTITUTIONAL:  Denies night sweats, fatigue, malaise, lethargy, fever or chills.  RESPIRATORY:  Denies cough, wheeze, hemoptysis, or shortness of breath.  CARDIOVASCULAR:  Denies chest pains, palpitations, pedal edema     Objective:     Blood pressure 115/80, pulse 85, temperature 36.4 °C (97.6 °F), height 1.6 m (5' 3\"), weight 57.2 kg (126 lb 3.2 oz), SpO2 94 %. Body mass index is 22.36 kg/m².    Physical Exam:  Constitutional: awake, alert, in no distress.  Skin: Warm, " dry, good turgor, no rashes, bruises, ulcers in visible areas.  Eye: conjunctiva clear, lids neg for edema or lesions.  Respiratory: Unlabored respiratory effort, lungs clear to auscultation, no wheezes, no rhonchi.  Cardiovascular: Normal S1, S2, no murmur, no pedal edema.  Abdomen: Soft, non-tender to palpation, no hernia, no hepatosplenomegaly.  Neuro: CN2-12 grossly intact. Strength 5/5, reflexes 2/4 in all extremities, no sensory deficits.   Psych: Oriented x3, emotional, intact judgement and insight.       Assessment and Plan:   The following treatment plan was discussed    1. JUAN MIGUEL (generalized anxiety disorder)  2. Moderate single current episode of major depressive disorder (CMS-HCC)  Worsening anxiety and depression secondary to social stress. Plan:   - REFERRAL TO BEHAVIORAL HEALTH  - citalopram (CELEXA) 10 MG tablet; Take 1 Tab by mouth every day.  Dispense: 90 Tab; Refill: 0  - continue Xanax  - relaxation, yoga, regular exercise    Total 25 minutes face-to-face time spent with patient, with greater than 50% of the total time discussing patient's issues and symptoms as listed above in assessment and plan, as well as managing coordination of care for future evaluation and treatment.      Jada Black M.D.      Followup: Return in about 3 months (around 12/7/2017) for Mental Health F/U.    Please note that this dictation was created using voice recognition software. I have made every reasonable attempt to correct obvious errors, but I expect that there are errors of grammar and possibly content that I did not discover before finalizing the note.

## 2017-09-11 ENCOUNTER — PATIENT MESSAGE (OUTPATIENT)
Dept: MEDICAL GROUP | Age: 32
End: 2017-09-11

## 2017-09-11 PROBLEM — E78.00 PURE HYPERCHOLESTEROLEMIA: Status: ACTIVE | Noted: 2017-09-11

## 2017-09-12 NOTE — TELEPHONE ENCOUNTER
From: Cinthia Ta  To: Jada Black M.D.  Sent: 9/11/2017 9:26 AM PDT  Subject: Test Result Question    Dr. Black,    I talked to my work about taking a leave of absence and they said that was fine, however they asked how long I needed and I said I wasn't sure, that I needed to talk to you. A couple questions for you:    They said I needed to use all my vacation time for it to be a paid leave. Otherwise, I could take FMLA and it would be unpaid and they would hold my position until I returned. Is this accurate? When I saw you last Thursday I believe you said if you wrote a note for my absence it would be for short term disability and would be paid. Can you confirm?    We talked about how much time I should take off before my wedding on October 7th, do you think it would be appropriate to take a week off and then go downto 3 days a week instead of 5?

## 2017-09-21 ENCOUNTER — OFFICE VISIT (OUTPATIENT)
Dept: MEDICAL GROUP | Age: 32
End: 2017-09-21
Payer: COMMERCIAL

## 2017-09-21 VITALS
SYSTOLIC BLOOD PRESSURE: 113 MMHG | DIASTOLIC BLOOD PRESSURE: 72 MMHG | OXYGEN SATURATION: 100 % | TEMPERATURE: 97.6 F | WEIGHT: 125.2 LBS | BODY MASS INDEX: 22.18 KG/M2 | HEIGHT: 63 IN | HEART RATE: 96 BPM

## 2017-09-21 DIAGNOSIS — Z23 NEED FOR VACCINATION: ICD-10-CM

## 2017-09-21 DIAGNOSIS — F41.1 GAD (GENERALIZED ANXIETY DISORDER): ICD-10-CM

## 2017-09-21 DIAGNOSIS — Z30.41 ENCOUNTER FOR SURVEILLANCE OF CONTRACEPTIVE PILLS: ICD-10-CM

## 2017-09-21 DIAGNOSIS — F32.1 MODERATE SINGLE CURRENT EPISODE OF MAJOR DEPRESSIVE DISORDER (HCC): ICD-10-CM

## 2017-09-21 DIAGNOSIS — F45.8 BRUXISM (TEETH GRINDING): ICD-10-CM

## 2017-09-21 DIAGNOSIS — Z00.00 PE (PHYSICAL EXAM), ANNUAL: Primary | ICD-10-CM

## 2017-09-21 PROBLEM — R19.8 CLENCHING OF TEETH: Status: RESOLVED | Noted: 2017-02-09 | Resolved: 2017-09-21

## 2017-09-21 PROCEDURE — 99395 PREV VISIT EST AGE 18-39: CPT | Performed by: FAMILY MEDICINE

## 2017-09-21 PROCEDURE — 90686 IIV4 VACC NO PRSV 0.5 ML IM: CPT | Performed by: FAMILY MEDICINE

## 2017-09-21 PROCEDURE — 90471 IMMUNIZATION ADMIN: CPT | Performed by: FAMILY MEDICINE

## 2017-09-21 RX ORDER — CYCLOBENZAPRINE HCL 5 MG
TABLET ORAL
Qty: 30 TAB | Refills: 0 | Status: SHIPPED | OUTPATIENT
Start: 2017-09-21 | End: 2017-11-02 | Stop reason: SDUPTHER

## 2017-09-21 RX ORDER — NORETHINDRONE ACETATE AND ETHINYL ESTRADIOL 1MG-20(21)
1 KIT ORAL DAILY
Qty: 28 TAB | Refills: 11 | Status: SHIPPED | OUTPATIENT
Start: 2017-09-21 | End: 2018-09-27

## 2017-09-21 RX ORDER — ALPRAZOLAM 0.25 MG/1
TABLET ORAL
Qty: 30 TAB | Refills: 0 | Status: ON HOLD | OUTPATIENT
Start: 2017-09-21 | End: 2020-03-06

## 2017-09-21 NOTE — PROGRESS NOTES
Subjective:   CC:Anxiety follow-up    HPI:     Cinthia Ta is a 32 y.o. female, established patient of the clinic, presents with the following concerns:     1. JUAN MIGUEL (generalized anxiety disorder)  Chronic with recent worsening of symptoms secondary to social stress (upcoming wedding, new job, stress at work, etc).  Patient has been taking low-dose Celexa since 2017. She has not noted any improvement.  She is taking Xanax 0.25 mg when necessary for symptoms, which appears to work well  She is trying to behavioral health, however, it takes approximately 8 weeks for her to be able to see the therapist. Patient wants to discuss options.  Her wedding is on 10/7/2017. Pt is interested in temporary absence from work to focus on her mental health.   She has tried couple sessions of yoga and found it helpful.     2. Encounter for surveillance of contraceptive pills  , sexually active, currently taking Tarina for contraception. Denies side effects with Tarina.   Also denies hx of tobacco abuse, hx of breast/gyn malignancy, hx of clotting disorders.   Pt is interested in continuing OCP for contraception.     3. Bruxism (teeth grinding)  Chronic, responding well to Flexeril before bed. Denies side effects.     Current medicines (including changes today)  Current Outpatient Prescriptions   Medication Sig Dispense Refill   • citalopram (CELEXA) 10 MG tablet Take 2 Tabs by mouth every day. 60 Tab 2   • alprazolam (XANAX) 0.25 MG Tab TAKE 1 TABLET BY MOUTH ONCE DAILY AS NEEDED FOR ANXIETY 30 Tab 0   • norethindrone-ethinyl estradiol (TARINA FE ) 1-20 MG-MCG per tablet Take 1 Tab by mouth every day. 28 Tab 11   • cyclobenzaprine (FLEXERIL) 5 MG tablet TAKE 1 TABLET BY MOUTH EVERY NIGHT AT BEDTIME 30 Tab 0     No current facility-administered medications for this visit.      She  has no past medical history on file.    I personally reviewed patient's problem list, allergies, medications, family hx,  "social hx with patient and update EPIC.     REVIEW OF SYSTEMS:  CONSTITUTIONAL:  Denies night sweats, fatigue, malaise, lethargy, fever or chills.  RESPIRATORY:  Denies cough, wheeze, hemoptysis, or shortness of breath.  CARDIOVASCULAR:  Denies chest pains, palpitations, pedal edema     Objective:     Blood pressure 113/72, pulse 96, temperature 36.4 °C (97.6 °F), height 1.607 m (5' 3.25\"), weight 56.8 kg (125 lb 3.2 oz), last menstrual period 09/17/2017, SpO2 100 %. Body mass index is 22 kg/m².    Physical Exam:  Constitutional: awake, alert, in no distress.  Skin: Warm, dry, good turgor, no rashes, bruises, ulcers in visible areas.  Eye: conjunctiva clear, lids neg for edema or lesions.  Respiratory: Unlabored respiratory effort, lungs clear to auscultation, no wheezes, no rhonchi.  Cardiovascular: Normal S1, S2, no murmur, no pedal edema.  Abdomen: Soft, non-tender to palpation, no hernia, no hepatosplenomegaly.  Neuro: CN2-12 grossly intact.    Psych: Oriented x3, affect and mood wnl, intact judgement and insight.       Assessment and Plan:   The following treatment plan was discussed    1. JUAN MIGUEL (generalized anxiety disorder)  Chronic with recent worsening of symptoms due to social stress. She has been on low dose Celexa for several weeks w/o noticeable effects.   She also taking Xanax PRN for symptoms. She is working on finding a therapist and has tried yoga, which appears to help. Plan:   - alprazolam (XANAX) 0.25 MG Tab; TAKE 1 TABLET BY MOUTH ONCE DAILY AS NEEDED FOR ANXIETY  Dispense: 30 Tab; Refill: 0  - continue Celexa, increased dose to 20 mg qd  - advised pt to explore psychology today website for therapist  - continue yoga  - work letter provided for temporary absence from work.     2. Encounter for surveillance of contraceptive pills  - norethindrone-ethinyl estradiol (TARINA FE 1/20) 1-20 MG-MCG per tablet; Take 1 Tab by mouth every day.  Dispense: 28 Tab; Refill: 11    3. Need for vaccination  - Flu " Quad Inj >3 Year Pre-Filled (Preservative Free)    4. Bruxism (teeth grinding)  Chronic nocturnal bruxism leading to jaw pain, improving with Flexeril qhs, will continue. Plan:   - cyclobenzaprine (FLEXERIL) 5 MG tablet; TAKE 1 TABLET BY MOUTH EVERY NIGHT AT BEDTIME  Dispense: 30 Tab; Refill: 0      Ly CAMMIE Black M.D.      Followup: Return in about 3 months (around 12/21/2017) for Mental Health F/U, Short.    Please note that this dictation was created using voice recognition software. I have made every reasonable attempt to correct obvious errors, but I expect that there are errors of grammar and possibly content that I did not discover before finalizing the note.

## 2017-09-21 NOTE — LETTER
September 21, 2017        Re: Cinthia Ta    To whom it may concern,    My name is Jada Black MD. I am taking care of Cinthia Ta, who are suffering acute illness that requires treatment. Please excuse Cinthia Ta from working duties from 09/25/17 to 10/6/2017. Cinthia can safely return to work on 10/9/2017.     If you have any questions, please do not hesitate to call my office.     Best regards,         Jada Black

## 2017-09-22 RX ORDER — CITALOPRAM HYDROBROMIDE 10 MG/1
20 TABLET ORAL DAILY
Qty: 60 TAB | Refills: 2
Start: 2017-09-22 | End: 2017-11-04

## 2017-11-02 DIAGNOSIS — F41.1 GAD (GENERALIZED ANXIETY DISORDER): ICD-10-CM

## 2017-11-02 DIAGNOSIS — F45.8 BRUXISM (TEETH GRINDING): ICD-10-CM

## 2017-11-02 DIAGNOSIS — F32.1 MODERATE SINGLE CURRENT EPISODE OF MAJOR DEPRESSIVE DISORDER (HCC): ICD-10-CM

## 2017-11-04 RX ORDER — CITALOPRAM HYDROBROMIDE 10 MG/1
TABLET ORAL
Qty: 90 TAB | Refills: 0 | Status: SHIPPED | OUTPATIENT
Start: 2017-11-04 | End: 2018-02-28 | Stop reason: SDUPTHER

## 2017-11-04 RX ORDER — CYCLOBENZAPRINE HCL 5 MG
TABLET ORAL
Qty: 90 TAB | Refills: 0 | Status: SHIPPED | OUTPATIENT
Start: 2017-11-04 | End: 2018-09-27

## 2017-12-21 ENCOUNTER — APPOINTMENT (OUTPATIENT)
Dept: MEDICAL GROUP | Age: 32
End: 2017-12-21
Payer: COMMERCIAL

## 2018-02-28 DIAGNOSIS — F41.1 GAD (GENERALIZED ANXIETY DISORDER): ICD-10-CM

## 2018-02-28 DIAGNOSIS — F32.1 MODERATE SINGLE CURRENT EPISODE OF MAJOR DEPRESSIVE DISORDER (HCC): ICD-10-CM

## 2018-03-01 RX ORDER — CITALOPRAM HYDROBROMIDE 10 MG/1
TABLET ORAL
Qty: 90 TAB | Refills: 0 | Status: SHIPPED | OUTPATIENT
Start: 2018-03-01 | End: 2018-05-30 | Stop reason: SDUPTHER

## 2018-03-30 ENCOUNTER — OFFICE VISIT (OUTPATIENT)
Dept: MEDICAL GROUP | Age: 33
End: 2018-03-30
Payer: COMMERCIAL

## 2018-03-30 VITALS
DIASTOLIC BLOOD PRESSURE: 68 MMHG | OXYGEN SATURATION: 96 % | RESPIRATION RATE: 16 BRPM | SYSTOLIC BLOOD PRESSURE: 110 MMHG | HEIGHT: 63 IN | TEMPERATURE: 98 F | HEART RATE: 94 BPM | BODY MASS INDEX: 23.04 KG/M2 | WEIGHT: 130 LBS

## 2018-03-30 DIAGNOSIS — F45.8 BRUXISM (TEETH GRINDING): ICD-10-CM

## 2018-03-30 DIAGNOSIS — Z31.89 ENCOUNTER FOR FERTILITY PLANNING: ICD-10-CM

## 2018-03-30 DIAGNOSIS — F41.8 SITUATIONAL ANXIETY: ICD-10-CM

## 2018-03-30 DIAGNOSIS — F32.1 MODERATE SINGLE CURRENT EPISODE OF MAJOR DEPRESSIVE DISORDER (HCC): ICD-10-CM

## 2018-03-30 PROCEDURE — 99214 OFFICE O/P EST MOD 30 MIN: CPT | Performed by: FAMILY MEDICINE

## 2018-03-30 ASSESSMENT — PATIENT HEALTH QUESTIONNAIRE - PHQ9: CLINICAL INTERPRETATION OF PHQ2 SCORE: 0

## 2018-03-31 NOTE — PROGRESS NOTES
Subjective:   CC: MDD follow-up    HPI:     Cinthia Alba is a 32 y.o. female, established patient of the clinic, presents with the following concerns:     Patient has history of chronic MDD and anxiety. She is taking Celexa 10 mg daily for the past 6 months. Her symptoms are in relatively good control. Patient states that she does not need to take Xanax more frequently. She denies suicidal ideation or plans.    Patient is , sexually active, taking birth control chronically for contraception. Patient recently got  and is interested in pregnancy. Patient has secondary amenorrhea from OCPs. Her  has history of vasectomy. He has 2 children with prior marriage. He has appointment with urology next week for consultation for reversal. Patient is interested in fertility counseling.    Patient has history of chronic bruxism. She is taking Flexeril 5 mg nightly as needed for symptoms. Patient states that her symptoms are improving with Celexa. She is now taking Flexeril only as needed for jaw tenderness or stiffness.    Current medicines (including changes today)  Current Outpatient Prescriptions   Medication Sig Dispense Refill   • citalopram (CELEXA) 10 MG tablet TAKE 1 TABLET BY MOUTH EVERY DAY 90 Tab 0   • cyclobenzaprine (FLEXERIL) 5 MG tablet TAKE 1 TABLET BY MOUTH EVERY NIGHT AT BEDTIME 90 Tab 0   • alprazolam (XANAX) 0.25 MG Tab TAKE 1 TABLET BY MOUTH ONCE DAILY AS NEEDED FOR ANXIETY 30 Tab 0   • norethindrone-ethinyl estradiol (TARINA FE ) 1-20 MG-MCG per tablet Take 1 Tab by mouth every day. 28 Tab 11     No current facility-administered medications for this visit.      She  has no past medical history on file.    I personally reviewed patient's problem list, allergies, medications, family hx, social hx with patient and update EPIC.     REVIEW OF SYSTEMS:  CONSTITUTIONAL:  Denies night sweats, fatigue, malaise, lethargy, fever or chills.  RESPIRATORY:  Denies cough, wheeze,  "hemoptysis, or shortness of breath.  CARDIOVASCULAR:  Denies chest pains, palpitations, pedal edema     Objective:     Blood pressure 110/68, pulse 94, temperature 36.7 °C (98 °F), resp. rate 16, height 1.6 m (5' 3\"), weight 59 kg (130 lb), last menstrual period 2018, SpO2 96 %, not currently breastfeeding. Body mass index is 23.03 kg/m².    Physical Exam:  Constitutional: awake, alert, in no distress.  Skin: Warm, dry, good turgor, no rashes, bruises, ulcers in visible areas.  Eye: conjunctiva clear, lids neg for edema or lesions.  Respiratory: Unlabored respiratory effort, lungs clear to auscultation, no wheezes, no rhonchi, no rales.  Cardiovascular: Normal S1, S2, no murmur, no pedal edema.  Psych: Oriented x3, affect and mood wnl, intact judgement and insight.       Assessment and Plan:   The following treatment plan was discussed    1. Moderate single current episode of major depressive disorder (CMS-HCC)  2. Situational anxiety  Chronic anxiety and depression, responding well to Celexa 10 mg daily, denies suicidal ideation or plan. Patient also take Xanax 0.25 mg when necessary for anxiety flares plans:   - Continue Celexa 10 mg daily  - Continue Xanax 0.25 mg when necessary for worsening anxiety  - regular exercise, well-balanced diet, multi-vitamin daily, weight loss, adequate sleep (8 hours per day), meditation, stress management.   - Avoid excessive consumption of stimulants, alcohol, illicit drugs, tobacco  - f/u in 6 months.     3. Bruxism (teeth grinding)  Chronic, improving but Celexa, taking Flexeril when necessary for jaw stiffness or tenderness. Will continue    4. Encounter for fertility planning  Patient is , sexually active, taking birth control chronically for contraception. Patient recently got  and is interested in pregnancy. Patient has secondary amenorrhea from OCPs. Her  has history of vasectomy. He has 2 children with prior marriage. He has appointment with urology " next week for consultation for reversal. Patient herself has never been pregnant. She is a product of IVF, but she is unclear of parental fertility problem. Patient is interested in fertility counseling. Plans:  - Start prenatal vitamins  - Discontinuation of Celexa and OCP  - Discussed management of anxiety and depression during and post pregnancy  -Patient's spouse should follow up with urology for vasectomy reversal consultation  -Avoidance of alcohol, tobacco during pregnancy.    Jada Black M.D.      Followup: Return in about 4 weeks (around 4/27/2018) for mole removal.    Please note that this dictation was created using voice recognition software. I have made every reasonable attempt to correct obvious errors, but I expect that there are errors of grammar and possibly content that I did not discover before finalizing the note.

## 2018-04-20 ENCOUNTER — HOSPITAL ENCOUNTER (OUTPATIENT)
Facility: MEDICAL CENTER | Age: 33
End: 2018-04-20
Attending: FAMILY MEDICINE
Payer: COMMERCIAL

## 2018-04-20 ENCOUNTER — OFFICE VISIT (OUTPATIENT)
Dept: MEDICAL GROUP | Age: 33
End: 2018-04-20
Payer: COMMERCIAL

## 2018-04-20 VITALS
SYSTOLIC BLOOD PRESSURE: 104 MMHG | HEART RATE: 66 BPM | HEIGHT: 63 IN | OXYGEN SATURATION: 98 % | TEMPERATURE: 98.2 F | DIASTOLIC BLOOD PRESSURE: 66 MMHG | WEIGHT: 133 LBS | BODY MASS INDEX: 23.57 KG/M2

## 2018-04-20 DIAGNOSIS — N91.1 SECONDARY AMENORRHEA: ICD-10-CM

## 2018-04-20 DIAGNOSIS — D48.5 NEOPLASM OF UNCERTAIN BEHAVIOR OF SKIN: Primary | ICD-10-CM

## 2018-04-20 LAB
PATHOLOGY CONSULT NOTE: NORMAL

## 2018-04-20 PROCEDURE — 11301 SHAVE SKIN LESION 0.6-1.0 CM: CPT | Mod: 59 | Performed by: FAMILY MEDICINE

## 2018-04-20 PROCEDURE — 11306 SHAVE SKIN LESION 0.6-1.0 CM: CPT | Mod: 59 | Performed by: FAMILY MEDICINE

## 2018-04-20 PROCEDURE — 99000 SPECIMEN HANDLING OFFICE-LAB: CPT | Performed by: FAMILY MEDICINE

## 2018-04-20 PROCEDURE — 99212 OFFICE O/P EST SF 10 MIN: CPT | Mod: 25 | Performed by: FAMILY MEDICINE

## 2018-04-20 PROCEDURE — 88305 TISSUE EXAM BY PATHOLOGIST: CPT | Mod: 59

## 2018-04-20 PROCEDURE — 17110 DESTRUCTION B9 LES UP TO 14: CPT | Performed by: FAMILY MEDICINE

## 2018-04-20 ASSESSMENT — PAIN SCALES - GENERAL: PAINLEVEL: NO PAIN

## 2018-04-22 NOTE — PROGRESS NOTES
Subjective:   CC: tender skin moles    HPI:     Cinthia Alba is a 32 y.o. female, established patient of the clinic, presents with the following concerns:     Pt presents with couple skin moles around her neck on both sides and along the hair lines. The lesions have been present for quite some time. They do not enlarge or change in characteristic. However, they frequent get caught and tangled with her jewelry and brush/comb leading to pain and discomfort. Pt is interested to have these lesions removed.     Pt is recently  to a man with hx of vasectomy. She is  and has been on OCPs for years for contraception. 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 She and her  are interested in having children. She and her  have had consultation with a urologist in [place] for reversal of vasectomy. She wants to discuss fertility issues today.     Current medicines (including changes today)  Current Outpatient Prescriptions   Medication Sig Dispense Refill   • citalopram (CELEXA) 10 MG tablet TAKE 1 TABLET BY MOUTH EVERY DAY 90 Tab 0   • cyclobenzaprine (FLEXERIL) 5 MG tablet TAKE 1 TABLET BY MOUTH EVERY NIGHT AT BEDTIME 90 Tab 0   • alprazolam (XANAX) 0.25 MG Tab TAKE 1 TABLET BY MOUTH ONCE DAILY AS NEEDED FOR ANXIETY 30 Tab 0   • norethindrone-ethinyl estradiol (TARINA FE ) 1-20 MG-MCG per tablet Take 1 Tab by mouth every day. 28 Tab 11     No current facility-administered medications for this visit.      She  has no past medical history on file.    I personally reviewed patient's problem list, allergies, medications, family hx, social hx with patient and update EPIC.     REVIEW OF SYSTEMS:  CONSTITUTIONAL:  Denies night sweats, fatigue, malaise, lethargy, fever or chills.  RESPIRATORY:  Denies cough, wheeze, hemoptysis, or shortness of breath.  CARDIOVASCULAR:  Denies chest pains, palpitations, pedal edema     Objective:     Blood pressure 104/66, pulse 66, temperature 36.8 °C (98.2 °F),  "height 1.6 m (5' 3\"), weight 60.3 kg (133 lb), last menstrual period 2018, SpO2 98 %. Body mass index is 23.56 kg/m².    Physical Exam:  Constitutional: awake, alert, in no distress.  Skin: Warm, dry, good turgor, no rashes, bruises, ulcers in visible areas.  - 0.6 cm, skin-colored, soft, mildly tender, mildly erythematous skin papule at the lateral right neck. Similar lesions were found at the posterior right neck, and right upper back/shoulder. 2 smaller lesions were found at the frontal hairline, one was found at the left lateral neck.   Eye: conjunctiva clear, lids neg for edema or lesions.  ENMT: TM and auditory canals wnl. Oral and nasal mucosa wnl. Lips without lesions, good dentition, oropharynx clear.  Neck: Trachea midline, no masses, no thyromegaly. No cervical or supraclavicular lymphadenopathy  Respiratory: Unlabored respiratory effort, lungs clear to auscultation, no wheezes, no rhonchi, no rales.  Cardiovascular: Normal S1, S2, no murmur, no pedal edema.  Psych: Oriented x3, affect and mood wnl, intact judgement and insight.       Assessment and Plan:   The following treatment plan was discussed    1. Neoplasm of uncertain behavior of skin  - PATHOLOGY SPECIMEN; Future  - PATHOLOGY SPECIMEN; Future  - PATHOLOGY SPECIMEN; Future  - surgical excision of the 3 lesions at the right lateral neck, right posterior neck, and right upper back/shoulder  - cryotherapy of the 3 lesions at the hairlines and left lateral neck.     2. Secondary amenorrhea:   Pt is recently  to a man with hx of vasectomy. She is  and has been on OCPs for years for contraception. 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 She and her  are interested in having children. She and her  have had consultation with a urologist in [place] for reversal of vasectomy. She wants to discuss fertility issues today. Plans:   - discontinue OCP, start prenatal vitamin.   - can use OTC ovulation kits to monitor for " ovulation  - pt has distal hx of GC and Chlamydia infection during college, which was treated successfully. Negative hx of PID or known gynecological disorders. Negative hx of infertility disorders.   - general fertility counseling provided.   - f/u PRN.     CRYOTHERAPY:  Discussed risks and benefits of cryotherapy. Patient verbally agreed. 3 applications of cryotherapy were applied to 3 lesions on left lateral neck and frontal hairline. Patient tolerated procedure well. Aftercare instructions given.    Procedure note: EXCISION OF SUSPICIOUS SKIN LESION   Indication: tender skin lesions  Location: right lateral neck, right posterior neck, right upper back/shoulder.   Risks, benefits, potential complications, and alternative options discussed with patient. Patient consented to the procedure.   Local anesthesia is achieved with 2% Lidocaine w/o EPI.   The lesion was identified, marked, and cleansed thoroughly with Povidone-Iodine sticks x3. Surrounding skin lesion was prepared and draped in the usual sterile manner. Lesions were removed by derma blades   Hemostasis was achieved with direct pressure and Drysol. The wound was dressed with topical antibiotic and sterile bandages.. Patient tolerates the procedure well. No immediate complications noted.    Surgical specimen sent to pathology.   Follow-up: Standard post-procedure care is explained and return precautions are given.     Jada Black M.D.    Followup: Return in about 2 weeks (around 5/4/2018) for As needed.    Please note that this dictation was created using voice recognition software. I have made every reasonable attempt to correct obvious errors, but I expect that there are errors of grammar and possibly content that I did not discover before finalizing the note.

## 2018-05-30 DIAGNOSIS — F32.1 MODERATE SINGLE CURRENT EPISODE OF MAJOR DEPRESSIVE DISORDER (HCC): ICD-10-CM

## 2018-05-30 DIAGNOSIS — F41.1 GAD (GENERALIZED ANXIETY DISORDER): ICD-10-CM

## 2018-05-31 RX ORDER — CITALOPRAM HYDROBROMIDE 10 MG/1
TABLET ORAL
Qty: 90 TAB | Refills: 1 | Status: SHIPPED | OUTPATIENT
Start: 2018-05-31 | End: 2018-09-27 | Stop reason: SDUPTHER

## 2018-09-27 ENCOUNTER — OFFICE VISIT (OUTPATIENT)
Dept: MEDICAL GROUP | Age: 33
End: 2018-09-27
Payer: COMMERCIAL

## 2018-09-27 VITALS
WEIGHT: 129.6 LBS | HEART RATE: 80 BPM | HEIGHT: 63 IN | BODY MASS INDEX: 22.96 KG/M2 | OXYGEN SATURATION: 97 % | TEMPERATURE: 97.1 F | DIASTOLIC BLOOD PRESSURE: 72 MMHG | SYSTOLIC BLOOD PRESSURE: 118 MMHG

## 2018-09-27 DIAGNOSIS — D48.5 NEOPLASM OF UNCERTAIN BEHAVIOR OF SKIN: ICD-10-CM

## 2018-09-27 DIAGNOSIS — F41.8 SITUATIONAL ANXIETY: ICD-10-CM

## 2018-09-27 DIAGNOSIS — F41.8 DEPRESSION WITH ANXIETY: ICD-10-CM

## 2018-09-27 DIAGNOSIS — F45.8 BRUXISM (TEETH GRINDING): ICD-10-CM

## 2018-09-27 DIAGNOSIS — E78.00 PURE HYPERCHOLESTEROLEMIA: ICD-10-CM

## 2018-09-27 DIAGNOSIS — Z00.00 PE (PHYSICAL EXAM), ANNUAL: Primary | ICD-10-CM

## 2018-09-27 PROBLEM — Z30.41 ENCOUNTER FOR SURVEILLANCE OF CONTRACEPTIVE PILLS: Status: RESOLVED | Noted: 2017-02-09 | Resolved: 2018-09-27

## 2018-09-27 PROBLEM — F32.1 MODERATE SINGLE CURRENT EPISODE OF MAJOR DEPRESSIVE DISORDER (HCC): Status: RESOLVED | Noted: 2017-09-07 | Resolved: 2018-09-27

## 2018-09-27 PROBLEM — F41.1 GAD (GENERALIZED ANXIETY DISORDER): Status: RESOLVED | Noted: 2017-02-09 | Resolved: 2018-09-27

## 2018-09-27 PROCEDURE — 99395 PREV VISIT EST AGE 18-39: CPT | Mod: 25 | Performed by: FAMILY MEDICINE

## 2018-09-27 PROCEDURE — 17110 DESTRUCTION B9 LES UP TO 14: CPT | Performed by: FAMILY MEDICINE

## 2018-09-27 RX ORDER — CITALOPRAM HYDROBROMIDE 10 MG/1
10 TABLET ORAL DAILY
Qty: 90 TAB | Refills: 1 | Status: SHIPPED | OUTPATIENT
Start: 2018-09-27 | End: 2019-04-30 | Stop reason: SDUPTHER

## 2018-09-27 RX ORDER — CYCLOBENZAPRINE HCL 5 MG
TABLET ORAL
Qty: 90 TAB | Refills: 0 | Status: ON HOLD | OUTPATIENT
Start: 2018-09-27 | End: 2020-03-06

## 2018-09-27 NOTE — PROGRESS NOTES
Subjective:   CC: Annual wellness visit    HPI:     Cinthia Alba is a 33 y.o. female, established patient of the clinic, presents with the following concerns:     Patient is a 33-year-old female with no major medical or surgical history.  Patient is , sexually active, trying to conceive.  Patient presents today for annual wellness visit.  Patient has depression with anxiety, situational anxiety, mild hyperlipidemia, and bruxism.  Patient is currently taking Celexa 10 mg daily.  She also takes Xanax 0.25 mg as needed for worsening anxiety.  Patient states that her mental health is in excellent control.  She rarely needs to use Xanax for worsening anxiety.  Patient states that her job is very stressful, and she is planning to quit her job in 2 weeks.  Patient states that she is interested in weaning off Celexa after quitting her job.  She denies suicidal ideation or plans.    Patient is using a mouthguard for bruxism and takes Flexeril nightly as needed to relax her jaws.  She denies any side effect with Flexeril.  In general, she avoid to take it unless she has to.    Patient complains of a small growth on her anterior left shoulder that is pruritus and causing some discomfort with clothing.  The lesion is slowly enlarging over the past few months.  Patient is interested in having the lesion removed.    Current medicines (including changes today)  Current Outpatient Prescriptions   Medication Sig Dispense Refill   • cyclobenzaprine (FLEXERIL) 5 MG tablet Take 1 tablet before bedtime as needed for bruxism 90 Tab 0   • citalopram (CELEXA) 10 MG tablet Take 1 Tab by mouth every day. 90 Tab 1   • alprazolam (XANAX) 0.25 MG Tab TAKE 1 TABLET BY MOUTH ONCE DAILY AS NEEDED FOR ANXIETY 30 Tab 0     No current facility-administered medications for this visit.      She  has no past medical history on file.    I personally reviewed patient's problem list, allergies, medications, family hx, social hx with patient  "and update EPIC.     REVIEW OF SYSTEMS:  CONSTITUTIONAL:  Denies night sweats, fatigue, malaise, lethargy, fever or chills.  RESPIRATORY:  Denies cough, wheeze, hemoptysis, or shortness of breath.  CARDIOVASCULAR:  Denies chest pains, palpitations, pedal edema     Objective:     Blood pressure 118/72, pulse 80, temperature 36.2 °C (97.1 °F), temperature source Temporal, height 1.6 m (5' 3\"), weight 58.8 kg (129 lb 9.6 oz), last menstrual period 09/18/2018, SpO2 97 %, not currently breastfeeding. Body mass index is 22.96 kg/m².    Physical Exam:  Constitutional: awake, alert, in no distress.  Skin: Warm, dry, good turgor, no rashes, bruises, ulcers in visible areas.  -0.4 cm nontender, non-erythematous, skin colored papule noted on anterior left shoulder.  Neck: Trachea midline, no masses, no thyromegaly. No cervical or supraclavicular lymphadenopathy  Respiratory: Unlabored respiratory effort, lungs clear to auscultation, no wheezes, no rales.  Cardiovascular: Normal S1, S2, no murmur, no pedal edema.  Abdomen: Soft, non-tender to palpation, active BS, no hernia, no hepatosplenomegaly, negative rebound or guarding.   Psych: Oriented x3, affect and mood wnl, intact judgement and insight.       Assessment and Plan:   The following treatment plan was discussed    1. Depression with anxiety  2. Situational anxiety  Chronic, stable, responding well to Celexa.  Patient is planning to quit her job as it is a major source of her stress and anxiety.  Patient is planning to gradually weaning off Celexa after.  Plans:  -Discussed weaning schedule with patient  - citalopram (CELEXA) 10 MG tablet; Take 1 Tab by mouth every day.  Dispense: 90 Tab; Refill: 1  - regular exercise, well-balanced diet, multi-vitamin daily, weight loss, adequate sleep (8 hours per day), meditation, stress management.   - Avoid excessive consumption of stimulants, alcohol, illicit drugs, tobacco  - f/u PRN      3. Pure hypercholesterolemia  Mild, low " 10-year CVD risk, recommended lifestyle modification.    4. Bruxism (teeth grinding)  Chronic, using mouthguard nightly as directed, denies any active joint pain, taking Flexeril as needed.  We will continue  - cyclobenzaprine (FLEXERIL) 5 MG tablet; Take 1 tablet before bedtime as needed for bruxism  Dispense: 90 Tab; Refill: 0    5. Neoplasm of uncertain behavior of skin  Exam was notable for 0.4 cm nontender, non-erythematous, skin colored papule noted on anterior left shoulder, likely benign nevus.  However, patient is symptomatic, recommended cryotherapy.      CRYOTHERAPY:  Discussed risks and benefits of cryotherapy. Patient verbally agreed. 2 applications of cryotherapy were applied to 1 lesion on anterior left shoulder. Patient tolerated procedure well. Aftercare instructions given.    6. PE (physical exam), annual  - pt is counseled on diet, exercise, vitamin supplement, mental health, sleep, stress  - discussed screening guidelines for pap, STD, mammogram, colonoscopy and vaccine recommendations.        Jada Black M.D.      Followup: Return for As needed.    Please note that this dictation was created using voice recognition software. I have made every reasonable attempt to correct obvious errors, but I expect that there are errors of grammar and possibly content that I did not discover before finalizing the note.

## 2019-02-12 ENCOUNTER — HOSPITAL ENCOUNTER (OUTPATIENT)
Dept: LAB | Facility: MEDICAL CENTER | Age: 34
End: 2019-02-12
Attending: OBSTETRICS & GYNECOLOGY
Payer: COMMERCIAL

## 2019-02-12 LAB
ABO GROUP BLD: NORMAL
RH BLD: NORMAL

## 2019-02-12 PROCEDURE — 86900 BLOOD TYPING SEROLOGIC ABO: CPT

## 2019-02-12 PROCEDURE — 86901 BLOOD TYPING SEROLOGIC RH(D): CPT

## 2019-02-12 PROCEDURE — 87591 N.GONORRHOEAE DNA AMP PROB: CPT

## 2019-02-12 PROCEDURE — 36415 COLL VENOUS BLD VENIPUNCTURE: CPT

## 2019-02-12 PROCEDURE — 83520 IMMUNOASSAY QUANT NOS NONAB: CPT

## 2019-02-12 PROCEDURE — 87340 HEPATITIS B SURFACE AG IA: CPT

## 2019-02-12 PROCEDURE — 84146 ASSAY OF PROLACTIN: CPT

## 2019-02-12 PROCEDURE — 87491 CHLMYD TRACH DNA AMP PROBE: CPT

## 2019-02-12 PROCEDURE — 84443 ASSAY THYROID STIM HORMONE: CPT

## 2019-02-12 PROCEDURE — 87389 HIV-1 AG W/HIV-1&-2 AB AG IA: CPT

## 2019-02-12 PROCEDURE — 86803 HEPATITIS C AB TEST: CPT

## 2019-02-12 PROCEDURE — 86780 TREPONEMA PALLIDUM: CPT

## 2019-02-12 PROCEDURE — 86706 HEP B SURFACE ANTIBODY: CPT

## 2019-02-12 PROCEDURE — 86762 RUBELLA ANTIBODY: CPT

## 2019-02-13 LAB
C TRACH DNA SPEC QL NAA+PROBE: NEGATIVE
HBV SURFACE AB SERPL IA-ACNC: 5.07 MIU/ML (ref 0–10)
HBV SURFACE AG SER QL: NEGATIVE
HCV AB SER QL: NEGATIVE
HIV 1+2 AB+HIV1 P24 AG SERPL QL IA: NON REACTIVE
N GONORRHOEA DNA SPEC QL NAA+PROBE: NEGATIVE
PROLACTIN SERPL-MCNC: 8.59 NG/ML (ref 2.8–26)
RUBV AB SER QL: 35.4 IU/ML
SPECIMEN SOURCE: NORMAL
TREPONEMA PALLIDUM IGG+IGM AB [PRESENCE] IN SERUM OR PLASMA BY IMMUNOASSAY: NON REACTIVE
TSH SERPL DL<=0.005 MIU/L-ACNC: 1.61 UIU/ML (ref 0.38–5.33)

## 2019-02-16 LAB — MIS SERPL-MCNC: 3.27 NG/ML (ref 0.18–11.71)

## 2019-03-14 ENCOUNTER — PATIENT MESSAGE (OUTPATIENT)
Dept: MEDICAL GROUP | Age: 34
End: 2019-03-14

## 2019-03-14 NOTE — LETTER
March 15, 2019        Re: Cinthia Alba    To whom it may concern,    My name is Jada Black MD. I am taking care of Cinthia Alba. Cinthia is suffering chronic medical illness needing support animal as part of her therapy. Please allow Dena to bring support animal onboard the airplane. Dena should follow rules and regulations pertaining to  pets to ensure public safety.     If you have any questions, please do not hesitate to call my office.     Best regards,                                        Jada Black

## 2019-04-30 DIAGNOSIS — F41.8 SITUATIONAL ANXIETY: ICD-10-CM

## 2019-04-30 DIAGNOSIS — F41.8 DEPRESSION WITH ANXIETY: ICD-10-CM

## 2019-04-30 RX ORDER — CITALOPRAM HYDROBROMIDE 10 MG/1
TABLET ORAL
Qty: 90 TAB | Refills: 1 | Status: SHIPPED | OUTPATIENT
Start: 2019-04-30 | End: 2020-01-15 | Stop reason: SDUPTHER

## 2019-07-15 ENCOUNTER — HOSPITAL ENCOUNTER (OUTPATIENT)
Dept: LAB | Facility: MEDICAL CENTER | Age: 34
End: 2019-07-15
Attending: OBSTETRICS & GYNECOLOGY
Payer: COMMERCIAL

## 2019-07-15 LAB — ESTRADIOL SERPL-MCNC: 2118 PG/ML

## 2019-07-15 PROCEDURE — 36415 COLL VENOUS BLD VENIPUNCTURE: CPT

## 2019-07-15 PROCEDURE — 82670 ASSAY OF TOTAL ESTRADIOL: CPT

## 2019-07-15 PROCEDURE — 84144 ASSAY OF PROGESTERONE: CPT

## 2019-07-16 LAB — PROGEST SERPL-MCNC: 64.59 NG/ML

## 2019-07-22 ENCOUNTER — HOSPITAL ENCOUNTER (OUTPATIENT)
Dept: LAB | Facility: MEDICAL CENTER | Age: 34
End: 2019-07-22
Attending: OBSTETRICS & GYNECOLOGY
Payer: COMMERCIAL

## 2019-07-22 LAB
B-HCG SERPL-ACNC: 425.9 MIU/ML (ref 0–5)
ESTRADIOL SERPL-MCNC: 2133 PG/ML
PROGEST SERPL-MCNC: 32.69 NG/ML

## 2019-07-22 PROCEDURE — 82670 ASSAY OF TOTAL ESTRADIOL: CPT

## 2019-07-22 PROCEDURE — 84702 CHORIONIC GONADOTROPIN TEST: CPT

## 2019-07-22 PROCEDURE — 84144 ASSAY OF PROGESTERONE: CPT

## 2019-07-22 PROCEDURE — 36415 COLL VENOUS BLD VENIPUNCTURE: CPT

## 2019-07-29 ENCOUNTER — HOSPITAL ENCOUNTER (OUTPATIENT)
Dept: LAB | Facility: MEDICAL CENTER | Age: 34
End: 2019-07-29
Attending: OBSTETRICS & GYNECOLOGY
Payer: COMMERCIAL

## 2019-07-29 LAB
B-HCG SERPL-ACNC: 9356.7 MIU/ML (ref 0–5)
ESTRADIOL SERPL-MCNC: 1983 PG/ML
PROGEST SERPL-MCNC: 42.34 NG/ML

## 2019-07-29 PROCEDURE — 82670 ASSAY OF TOTAL ESTRADIOL: CPT

## 2019-07-29 PROCEDURE — 84702 CHORIONIC GONADOTROPIN TEST: CPT

## 2019-07-29 PROCEDURE — 36415 COLL VENOUS BLD VENIPUNCTURE: CPT

## 2019-07-29 PROCEDURE — 84144 ASSAY OF PROGESTERONE: CPT

## 2020-01-15 DIAGNOSIS — F41.8 DEPRESSION WITH ANXIETY: ICD-10-CM

## 2020-01-15 DIAGNOSIS — F41.8 SITUATIONAL ANXIETY: ICD-10-CM

## 2020-01-16 RX ORDER — CITALOPRAM HYDROBROMIDE 10 MG/1
10 TABLET ORAL
Qty: 30 TAB | Refills: 0 | Status: SHIPPED | OUTPATIENT
Start: 2020-01-16 | End: 2020-02-18

## 2020-01-24 ENCOUNTER — HOSPITAL ENCOUNTER (OUTPATIENT)
Dept: LAB | Facility: MEDICAL CENTER | Age: 35
End: 2020-01-24
Attending: OBSTETRICS & GYNECOLOGY
Payer: COMMERCIAL

## 2020-01-24 LAB
BASOPHILS # BLD AUTO: 0.6 % (ref 0–1.8)
BASOPHILS # BLD: 0.06 K/UL (ref 0–0.12)
EOSINOPHIL # BLD AUTO: 0.06 K/UL (ref 0–0.51)
EOSINOPHIL NFR BLD: 0.6 % (ref 0–6.9)
ERYTHROCYTE [DISTWIDTH] IN BLOOD BY AUTOMATED COUNT: 47.7 FL (ref 35.9–50)
GLUCOSE 1H P 50 G GLC PO SERPL-MCNC: 129 MG/DL (ref 70–139)
HCT VFR BLD AUTO: 37.6 % (ref 37–47)
HGB BLD-MCNC: 12.2 G/DL (ref 12–16)
IMM GRANULOCYTES # BLD AUTO: 0.05 K/UL (ref 0–0.11)
IMM GRANULOCYTES NFR BLD AUTO: 0.5 % (ref 0–0.9)
LYMPHOCYTES # BLD AUTO: 1.85 K/UL (ref 1–4.8)
LYMPHOCYTES NFR BLD: 17.3 % (ref 22–41)
MCH RBC QN AUTO: 32.4 PG (ref 27–33)
MCHC RBC AUTO-ENTMCNC: 32.4 G/DL (ref 33.6–35)
MCV RBC AUTO: 100 FL (ref 81.4–97.8)
MONOCYTES # BLD AUTO: 0.58 K/UL (ref 0–0.85)
MONOCYTES NFR BLD AUTO: 5.4 % (ref 0–13.4)
NEUTROPHILS # BLD AUTO: 8.1 K/UL (ref 2–7.15)
NEUTROPHILS NFR BLD: 75.6 % (ref 44–72)
NRBC # BLD AUTO: 0 K/UL
NRBC BLD-RTO: 0 /100 WBC
PLATELET # BLD AUTO: 205 K/UL (ref 164–446)
PMV BLD AUTO: 11 FL (ref 9–12.9)
RBC # BLD AUTO: 3.76 M/UL (ref 4.2–5.4)
TREPONEMA PALLIDUM IGG+IGM AB [PRESENCE] IN SERUM OR PLASMA BY IMMUNOASSAY: NON REACTIVE
WBC # BLD AUTO: 10.7 K/UL (ref 4.8–10.8)

## 2020-01-24 PROCEDURE — 86780 TREPONEMA PALLIDUM: CPT

## 2020-01-24 PROCEDURE — 85025 COMPLETE CBC W/AUTO DIFF WBC: CPT

## 2020-01-24 PROCEDURE — 82950 GLUCOSE TEST: CPT

## 2020-02-17 ENCOUNTER — HOSPITAL ENCOUNTER (OUTPATIENT)
Facility: MEDICAL CENTER | Age: 35
End: 2020-02-17
Attending: OBSTETRICS & GYNECOLOGY | Admitting: OBSTETRICS & GYNECOLOGY
Payer: COMMERCIAL

## 2020-02-17 VITALS
OXYGEN SATURATION: 98 % | TEMPERATURE: 99.3 F | BODY MASS INDEX: 28.53 KG/M2 | WEIGHT: 161 LBS | SYSTOLIC BLOOD PRESSURE: 135 MMHG | RESPIRATION RATE: 16 BRPM | DIASTOLIC BLOOD PRESSURE: 89 MMHG | HEART RATE: 82 BPM | HEIGHT: 63 IN

## 2020-02-17 LAB
APPEARANCE UR: CLEAR
COLOR UR AUTO: YELLOW
GLUCOSE UR QL STRIP.AUTO: NEGATIVE MG/DL
KETONES UR QL STRIP.AUTO: NEGATIVE MG/DL
LEUKOCYTE ESTERASE UR QL STRIP.AUTO: NEGATIVE
NITRITE UR QL STRIP.AUTO: NEGATIVE
PH UR STRIP.AUTO: 6.5 [PH] (ref 5–8)
PROT UR QL STRIP: NEGATIVE MG/DL
RBC UR QL AUTO: NEGATIVE
SP GR UR: <=1.005 (ref 1–1.03)

## 2020-02-17 PROCEDURE — 81002 URINALYSIS NONAUTO W/O SCOPE: CPT

## 2020-02-17 PROCEDURE — 59025 FETAL NON-STRESS TEST: CPT

## 2020-02-17 NOTE — PROGRESS NOTES
"Obstetrics and Gynecology  Labor and Delivery Assessment Note    ID: 34 y.o. is a  with IUP at 34w3d    Primary Obstetrician: Roldan Joe M.D.    Assessing Obstetrician: Washington Bergman MD    CC: cramping and pelvic pressure    HPI: Pt has c/o cramping and pelvic pressure that began at approximately 0300 hrs. this morning.  She states she has pressure in her back and intermittent cramping that is approximately twice an hour.  She states that her anxiety has been more severe at this point in pregnancy which is complicated by fetal findings of intrauterine growth restriction and now these new symptoms.  She currently takes Celexa 10 mg for this.  She is not currently in counseling.  She states 1 of her biggest issues is her concern about having panic attacks when the symptoms arise.  She states she is not currently feeling much in the way of contractions.  She denies leaking of fluid.  She denies vaginal bleeding.  She endorses good fetal movement.  She has no other specific complaints today.    ROS: 10 systems negative except as noted above.    O: /89   Pulse 82   Temp 37.4 °C (99.3 °F)   Resp 16   Ht 1.6 m (5' 3\")   Wt 73 kg (161 lb)   SpO2 98%   BMI 28.52 kg/m²    Gen: NAD, AAO  Pulm: no distress  Abd: Gravid, soft, uterus NTTP, no rebound/guarding  Ext: WWP, 2+ DPP, no edema  SVE: Long, closed, high and firm per RN exam    NST: 135/mod heather/+accels, -decels  [place]: irritability    Urinalysis:   2020 13:09   POC Color Yellow   POC Appearance Clear   POC Specific Gravity <=1.005 (A)   POC Urine PH 6.5   POC Glucose Negative   POC Ketones Negative   POC Protein Negative   POC Nitrites Negative   POC Leukocyte Esterase Negative   POC Blood Negative       A/P: Cinthia Alba is a 34 y.o.  at 34w3d with pelvic pressure.  IUGR.  AVSS.  Reassuring, reactive NST.  *Pelvic pressure: The patient presents with pelvic pressure and only intermittent contractions.  She had a few " contractions that were very irregular on the monitor, but she states that she has not felt any in a pattern.  She does not have evidence of a urinary tract infection . Her cervix was long, closed, high and firm making  labor unlikely.   *Anxiety: Patient does not seem well controlled on her Celexa, but does not have any thoughts of hurting herself or someone else.  She states that her anxiety has improved significantly after finding out that her cervix is closed.  We discussed that intermittent contractions will likely be common at this point in pregnancy and that a recognition of this may help her ease her concerns.  I encouraged her to address these issues with her prescribing physician Dr. Black and consider restarting counseling for CBT.      She will f/u with Dr. Joe and the [facility] as scheduled    Washington Bergman M.D., 2020, 3:08 PM

## 2020-02-17 NOTE — PROGRESS NOTES
Patient comes in with complaints of cramping and pressure.  She denies leaking and bleeding.  Feels fetal movement.  Denies burning or frequency with voiding.   She is also complains of increased anxiety and difficulty sleeping.      POC done, results reviewed with DR Bergman.  SVE done, closed/thick/long/high.  FFN not sent per Dr Bergman.      Discussed anxiety and normal pregancy symptoms with patient.  Encouraged patient to discuss anxiety medication Celexa 10mg daily with Dr Joe.  Discuss nonpharmacologic methods of relaxation, exercise, massage, therapy, hot shower.  Patient reassured that symptoms of her pregnancy are normal and that is normal for them to change.     Dr Bergman at bedside.      Patient to be discharged.   labor precautions reviewed with patient.  Patient and  ambulated out.

## 2020-02-26 ENCOUNTER — HOSPITAL ENCOUNTER (OUTPATIENT)
Dept: LAB | Facility: MEDICAL CENTER | Age: 35
End: 2020-02-26
Attending: NURSE PRACTITIONER
Payer: COMMERCIAL

## 2020-02-26 PROCEDURE — 87081 CULTURE SCREEN ONLY: CPT

## 2020-02-26 PROCEDURE — 87150 DNA/RNA AMPLIFIED PROBE: CPT

## 2020-02-28 LAB — GP B STREP DNA SPEC QL NAA+PROBE: NEGATIVE

## 2020-03-02 NOTE — H&P
DATE OF ADMISSION:  2020    CHIEF COMPLAINT:  Here for induction of labor.    HISTORY OF PRESENT ILLNESS:  The patient is a 34-year-old  who presents   to labor and delivery today at 37 weeks and 1 day for early term induction of   labor for intrauterine growth restriction and preeclampsia without severe features.  She received her prenatal care with myself this pregnancy.  This pregnancy was achieved via in vitro fertilization with transfer of a single female embryo.  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  She had negative carrier screening panel prior to her IVF and she was subsequently referred to [facility] for first trimester screen and fetal echocardiography as well.  This was found to be unremarkable, but she did develop intrauterine growth restriction.  She always had normal   Dopplers, but the growth percentage did drop off to less than 3rd percentile.  She developed elevated blood pressures and proteinuria in her 36th week as well. Thus, induction of labor in her 37th week was recommended by [facility].  Otherwise, her prenatal care was unremarkable.    REVIEW IN SYSTEMS:  All other systems were reviewed and were found to be   negative.    PAST MEDICAL HISTORY:  None.    PAST SURGICAL HISTORY:  Bunion surgery.    OBSTETRICAL HISTORY:  This is her first pregnancy.    GYNECOLOGIC HISTORY:  The patient has a history of prior chlamydia and   gonorrhea, both of which were previously treated.  She has a history of prior   abnormal Pap smears, but her most recent was negative for intraepithelial   lesions or malignancies and negative for HPV in 2019.    FAMILY HISTORY:  Remarkable for breast cancer and mental illness.    SOCIAL HISTORY:  Denies tobacco, alcohol, or illicit drug use.    MEDICATIONS:  Prenatal vitamins, Proctofoam and Anusol.    ALLERGIES:  No known drug allergies.    PHYSICAL EXAMINATION:  VITAL SIGNS:   In office on 02/26, blood pressure was 128/80, weight was 164   pounds.  GENERAL:  She was in no apparent distress.  ABDOMEN:  Gravid, nontender.  EXTREMITIES:  Her extremities had no edema.    LABORATORY DATA:  ABO is O positive.  Her group B strep is pending as of this   dictation.  Her glucose tolerance test is 129.  She is HIV negative, hepatitis   B negative, RPR negative.  Her rubella is immune.    ASSESSMENT:  1. Early term intrauterine pregnancy at 37 weeks and 1 day.  2. Intrauterine growth restriction with worsening of growth restriction noted   on last [facility].  3. Preeclampsia without severe features  4. In vitro fertilization pregnancy.    PLAN:  The patient will be admitted to labor and delivery for induction of   labor.  Method of induction to be determined pending her cervical examination   and contractions on presentation.  Likely, we will use misoprostol or Cook   balloon with plan for eventual Pitocin and artificial rupture of membranes.  Will check preeclampsia lab evaluation. She may have an epidural on demand.  Anticipate   spontaneous vaginal delivery.             ____________________________________     MD DILCIA Rivers / NTS    DD:  03/02/2020 07:32:43  DT:  03/02/2020 08:49:51    D#:  [id number]  Job#:  [id number]

## 2020-03-05 ENCOUNTER — HOSPITAL ENCOUNTER (OUTPATIENT)
Dept: LAB | Facility: MEDICAL CENTER | Age: 35
End: 2020-03-05
Attending: OBSTETRICS & GYNECOLOGY
Payer: COMMERCIAL

## 2020-03-05 LAB
ALBUMIN SERPL BCP-MCNC: 3.1 G/DL (ref 3.2–4.9)
ALBUMIN/GLOB SERPL: 1.2 G/DL
ALP SERPL-CCNC: 164 U/L (ref 30–99)
ALT SERPL-CCNC: 8 U/L (ref 2–50)
ANION GAP SERPL CALC-SCNC: 6 MMOL/L (ref 0–11.9)
AST SERPL-CCNC: 17 U/L (ref 12–45)
BASOPHILS # BLD AUTO: 0.4 % (ref 0–1.8)
BASOPHILS # BLD: 0.03 K/UL (ref 0–0.12)
BILIRUB SERPL-MCNC: 0.4 MG/DL (ref 0.1–1.5)
BUN SERPL-MCNC: 18 MG/DL (ref 8–22)
CALCIUM SERPL-MCNC: 8.6 MG/DL (ref 8.5–10.5)
CHLORIDE SERPL-SCNC: 105 MMOL/L (ref 96–112)
CO2 SERPL-SCNC: 24 MMOL/L (ref 20–33)
CREAT SERPL-MCNC: 0.82 MG/DL (ref 0.5–1.4)
CREAT UR-MCNC: 188.4 MG/DL
EOSINOPHIL # BLD AUTO: 0.03 K/UL (ref 0–0.51)
EOSINOPHIL NFR BLD: 0.4 % (ref 0–6.9)
ERYTHROCYTE [DISTWIDTH] IN BLOOD BY AUTOMATED COUNT: 45.5 FL (ref 35.9–50)
GLOBULIN SER CALC-MCNC: 2.6 G/DL (ref 1.9–3.5)
GLUCOSE SERPL-MCNC: 73 MG/DL (ref 65–99)
HCT VFR BLD AUTO: 39.4 % (ref 37–47)
HGB BLD-MCNC: 13.1 G/DL (ref 12–16)
IMM GRANULOCYTES # BLD AUTO: 0.02 K/UL (ref 0–0.11)
IMM GRANULOCYTES NFR BLD AUTO: 0.2 % (ref 0–0.9)
LYMPHOCYTES # BLD AUTO: 1.88 K/UL (ref 1–4.8)
LYMPHOCYTES NFR BLD: 23.4 % (ref 22–41)
MCH RBC QN AUTO: 33.1 PG (ref 27–33)
MCHC RBC AUTO-ENTMCNC: 33.2 G/DL (ref 33.6–35)
MCV RBC AUTO: 99.5 FL (ref 81.4–97.8)
MONOCYTES # BLD AUTO: 0.52 K/UL (ref 0–0.85)
MONOCYTES NFR BLD AUTO: 6.5 % (ref 0–13.4)
NEUTROPHILS # BLD AUTO: 5.54 K/UL (ref 2–7.15)
NEUTROPHILS NFR BLD: 69.1 % (ref 44–72)
NRBC # BLD AUTO: 0 K/UL
NRBC BLD-RTO: 0 /100 WBC
PLATELET # BLD AUTO: 180 K/UL (ref 164–446)
PMV BLD AUTO: 11.9 FL (ref 9–12.9)
POTASSIUM SERPL-SCNC: 4.3 MMOL/L (ref 3.6–5.5)
PROT SERPL-MCNC: 5.7 G/DL (ref 6–8.2)
PROT UR-MCNC: 750.7 MG/DL (ref 0–15)
PROT/CREAT UR: 3985 MG/G (ref 10–107)
RBC # BLD AUTO: 3.96 M/UL (ref 4.2–5.4)
SODIUM SERPL-SCNC: 135 MMOL/L (ref 135–145)
URATE SERPL-MCNC: 6 MG/DL (ref 1.9–8.2)
WBC # BLD AUTO: 8 K/UL (ref 4.8–10.8)

## 2020-03-05 PROCEDURE — 84550 ASSAY OF BLOOD/URIC ACID: CPT

## 2020-03-05 PROCEDURE — 80053 COMPREHEN METABOLIC PANEL: CPT

## 2020-03-05 PROCEDURE — 85025 COMPLETE CBC W/AUTO DIFF WBC: CPT

## 2020-03-05 PROCEDURE — 82570 ASSAY OF URINE CREATININE: CPT

## 2020-03-05 PROCEDURE — 84156 ASSAY OF PROTEIN URINE: CPT

## 2020-03-06 ENCOUNTER — APPOINTMENT (OUTPATIENT)
Dept: OBGYN | Facility: MEDICAL CENTER | Age: 35
End: 2020-03-06
Attending: OBSTETRICS & GYNECOLOGY
Payer: COMMERCIAL

## 2020-03-06 ENCOUNTER — HOSPITAL ENCOUNTER (INPATIENT)
Facility: MEDICAL CENTER | Age: 35
LOS: 3 days | End: 2020-03-10
Attending: OBSTETRICS & GYNECOLOGY | Admitting: OBSTETRICS & GYNECOLOGY
Payer: COMMERCIAL

## 2020-03-06 PROCEDURE — 84520 ASSAY OF UREA NITROGEN: CPT

## 2020-03-06 PROCEDURE — 84550 ASSAY OF BLOOD/URIC ACID: CPT

## 2020-03-06 PROCEDURE — 85025 COMPLETE CBC W/AUTO DIFF WBC: CPT

## 2020-03-06 PROCEDURE — 84450 TRANSFERASE (AST) (SGOT): CPT

## 2020-03-06 PROCEDURE — 82565 ASSAY OF CREATININE: CPT | Mod: QW

## 2020-03-06 ASSESSMENT — LIFESTYLE VARIABLES
ALCOHOL_USE: NO
EVER_SMOKED: NEVER

## 2020-03-06 ASSESSMENT — PATIENT HEALTH QUESTIONNAIRE - PHQ9
2. FEELING DOWN, DEPRESSED, IRRITABLE, OR HOPELESS: NOT AT ALL
SUM OF ALL RESPONSES TO PHQ9 QUESTIONS 1 AND 2: 0
1. LITTLE INTEREST OR PLEASURE IN DOING THINGS: NOT AT ALL

## 2020-03-07 ENCOUNTER — ANESTHESIA (OUTPATIENT)
Dept: ANESTHESIOLOGY | Facility: MEDICAL CENTER | Age: 35
End: 2020-03-07
Payer: COMMERCIAL

## 2020-03-07 ENCOUNTER — ANESTHESIA EVENT (OUTPATIENT)
Dept: ANESTHESIOLOGY | Facility: MEDICAL CENTER | Age: 35
End: 2020-03-07
Payer: COMMERCIAL

## 2020-03-07 LAB
ALBUMIN SERPL BCP-MCNC: 3.1 G/DL (ref 3.2–4.9)
ALBUMIN/GLOB SERPL: 1.1 G/DL
ALP SERPL-CCNC: 168 U/L (ref 30–99)
ALT SERPL-CCNC: 8 U/L (ref 2–50)
ANION GAP SERPL CALC-SCNC: 9 MMOL/L (ref 0–11.9)
AST SERPL-CCNC: 16 U/L (ref 12–45)
AST SERPL-CCNC: 17 U/L (ref 12–45)
BASOPHILS # BLD AUTO: 0.5 % (ref 0–1.8)
BASOPHILS # BLD: 0.04 K/UL (ref 0–0.12)
BILIRUB SERPL-MCNC: 0.5 MG/DL (ref 0.1–1.5)
BUN SERPL-MCNC: 18 MG/DL (ref 8–22)
BUN SERPL-MCNC: 19 MG/DL (ref 8–22)
CALCIUM SERPL-MCNC: 8.8 MG/DL (ref 8.5–10.5)
CHLORIDE SERPL-SCNC: 105 MMOL/L (ref 96–112)
CO2 SERPL-SCNC: 23 MMOL/L (ref 20–33)
CREAT SERPL-MCNC: 0.75 MG/DL (ref 0.5–1.4)
CREAT SERPL-MCNC: 0.87 MG/DL (ref 0.5–1.4)
EOSINOPHIL # BLD AUTO: 0.03 K/UL (ref 0–0.51)
EOSINOPHIL NFR BLD: 0.4 % (ref 0–6.9)
ERYTHROCYTE [DISTWIDTH] IN BLOOD BY AUTOMATED COUNT: 42.5 FL (ref 35.9–50)
GLOBULIN SER CALC-MCNC: 2.8 G/DL (ref 1.9–3.5)
GLUCOSE SERPL-MCNC: 77 MG/DL (ref 65–99)
HCT VFR BLD AUTO: 37.1 % (ref 37–47)
HGB BLD-MCNC: 12.8 G/DL (ref 12–16)
HOLDING TUBE BB 8507: NORMAL
IMM GRANULOCYTES # BLD AUTO: 0.09 K/UL (ref 0–0.11)
IMM GRANULOCYTES NFR BLD AUTO: 1.2 % (ref 0–0.9)
LYMPHOCYTES # BLD AUTO: 1.82 K/UL (ref 1–4.8)
LYMPHOCYTES NFR BLD: 23.8 % (ref 22–41)
MCH RBC QN AUTO: 32.9 PG (ref 27–33)
MCHC RBC AUTO-ENTMCNC: 34.5 G/DL (ref 33.6–35)
MCV RBC AUTO: 95.4 FL (ref 81.4–97.8)
MONOCYTES # BLD AUTO: 0.52 K/UL (ref 0–0.85)
MONOCYTES NFR BLD AUTO: 6.8 % (ref 0–13.4)
NEUTROPHILS # BLD AUTO: 5.16 K/UL (ref 2–7.15)
NEUTROPHILS NFR BLD: 67.3 % (ref 44–72)
NRBC # BLD AUTO: 0 K/UL
NRBC BLD-RTO: 0 /100 WBC
PLATELET # BLD AUTO: 180 K/UL (ref 164–446)
PMV BLD AUTO: 11.3 FL (ref 9–12.9)
POTASSIUM SERPL-SCNC: 4 MMOL/L (ref 3.6–5.5)
PROT SERPL-MCNC: 5.9 G/DL (ref 6–8.2)
RBC # BLD AUTO: 3.89 M/UL (ref 4.2–5.4)
SODIUM SERPL-SCNC: 137 MMOL/L (ref 135–145)
URATE SERPL-MCNC: 6.4 MG/DL (ref 1.9–8.2)
WBC # BLD AUTO: 7.7 K/UL (ref 4.8–10.8)

## 2020-03-07 PROCEDURE — 80053 COMPREHEN METABOLIC PANEL: CPT

## 2020-03-07 PROCEDURE — 770002 HCHG ROOM/CARE - OB PRIVATE (112)

## 2020-03-07 PROCEDURE — 700105 HCHG RX REV CODE 258

## 2020-03-07 PROCEDURE — A9270 NON-COVERED ITEM OR SERVICE: HCPCS | Performed by: OBSTETRICS & GYNECOLOGY

## 2020-03-07 PROCEDURE — 700102 HCHG RX REV CODE 250 W/ 637 OVERRIDE(OP): Performed by: OBSTETRICS & GYNECOLOGY

## 2020-03-07 PROCEDURE — 700111 HCHG RX REV CODE 636 W/ 250 OVERRIDE (IP): Performed by: ANESTHESIOLOGY

## 2020-03-07 PROCEDURE — 36415 COLL VENOUS BLD VENIPUNCTURE: CPT

## 2020-03-07 PROCEDURE — 700105 HCHG RX REV CODE 258: Performed by: OBSTETRICS & GYNECOLOGY

## 2020-03-07 PROCEDURE — 700111 HCHG RX REV CODE 636 W/ 250 OVERRIDE (IP)

## 2020-03-07 PROCEDURE — 700101 HCHG RX REV CODE 250: Performed by: ANESTHESIOLOGY

## 2020-03-07 PROCEDURE — 700111 HCHG RX REV CODE 636 W/ 250 OVERRIDE (IP): Performed by: OBSTETRICS & GYNECOLOGY

## 2020-03-07 RX ORDER — ROPIVACAINE HYDROCHLORIDE 2 MG/ML
INJECTION, SOLUTION EPIDURAL; INFILTRATION; PERINEURAL
Status: COMPLETED
Start: 2020-03-07 | End: 2020-03-07

## 2020-03-07 RX ORDER — SODIUM CHLORIDE, SODIUM LACTATE, POTASSIUM CHLORIDE, AND CALCIUM CHLORIDE .6; .31; .03; .02 G/100ML; G/100ML; G/100ML; G/100ML
1000 INJECTION, SOLUTION INTRAVENOUS
Status: COMPLETED | OUTPATIENT
Start: 2020-03-07 | End: 2020-03-07

## 2020-03-07 RX ORDER — SODIUM CHLORIDE, SODIUM LACTATE, POTASSIUM CHLORIDE, AND CALCIUM CHLORIDE .6; .31; .03; .02 G/100ML; G/100ML; G/100ML; G/100ML
250 INJECTION, SOLUTION INTRAVENOUS PRN
Status: DISCONTINUED | OUTPATIENT
Start: 2020-03-07 | End: 2020-03-08 | Stop reason: HOSPADM

## 2020-03-07 RX ORDER — SODIUM CHLORIDE, SODIUM LACTATE, POTASSIUM CHLORIDE, CALCIUM CHLORIDE 600; 310; 30; 20 MG/100ML; MG/100ML; MG/100ML; MG/100ML
INJECTION, SOLUTION INTRAVENOUS CONTINUOUS
Status: ACTIVE | OUTPATIENT
Start: 2020-03-07 | End: 2020-03-07

## 2020-03-07 RX ORDER — ONDANSETRON 4 MG/1
4 TABLET, ORALLY DISINTEGRATING ORAL EVERY 6 HOURS PRN
Status: DISCONTINUED | OUTPATIENT
Start: 2020-03-07 | End: 2020-03-10 | Stop reason: HOSPADM

## 2020-03-07 RX ORDER — ROPIVACAINE HYDROCHLORIDE 2 MG/ML
INJECTION, SOLUTION EPIDURAL; INFILTRATION; PERINEURAL CONTINUOUS
Status: DISCONTINUED | OUTPATIENT
Start: 2020-03-07 | End: 2020-03-08 | Stop reason: HOSPADM

## 2020-03-07 RX ORDER — ONDANSETRON 2 MG/ML
4 INJECTION INTRAMUSCULAR; INTRAVENOUS EVERY 6 HOURS PRN
Status: DISCONTINUED | OUTPATIENT
Start: 2020-03-07 | End: 2020-03-10 | Stop reason: HOSPADM

## 2020-03-07 RX ORDER — DEXTROSE, SODIUM CHLORIDE, SODIUM LACTATE, POTASSIUM CHLORIDE, AND CALCIUM CHLORIDE 5; .6; .31; .03; .02 G/100ML; G/100ML; G/100ML; G/100ML; G/100ML
INJECTION, SOLUTION INTRAVENOUS CONTINUOUS
Status: DISCONTINUED | OUTPATIENT
Start: 2020-03-07 | End: 2020-03-08 | Stop reason: HOSPADM

## 2020-03-07 RX ORDER — HYDROCODONE BITARTRATE AND ACETAMINOPHEN 5; 325 MG/1; MG/1
1 TABLET ORAL EVERY 4 HOURS PRN
Status: DISCONTINUED | OUTPATIENT
Start: 2020-03-07 | End: 2020-03-10 | Stop reason: HOSPADM

## 2020-03-07 RX ORDER — HYDROCODONE BITARTRATE AND ACETAMINOPHEN 10; 325 MG/1; MG/1
1 TABLET ORAL EVERY 4 HOURS PRN
Status: DISCONTINUED | OUTPATIENT
Start: 2020-03-07 | End: 2020-03-10 | Stop reason: HOSPADM

## 2020-03-07 RX ORDER — IBUPROFEN 800 MG/1
800 TABLET ORAL EVERY 8 HOURS PRN
Status: DISCONTINUED | OUTPATIENT
Start: 2020-03-07 | End: 2020-03-10 | Stop reason: HOSPADM

## 2020-03-07 RX ORDER — SODIUM CHLORIDE, SODIUM LACTATE, POTASSIUM CHLORIDE, CALCIUM CHLORIDE 600; 310; 30; 20 MG/100ML; MG/100ML; MG/100ML; MG/100ML
INJECTION, SOLUTION INTRAVENOUS CONTINUOUS
Status: DISCONTINUED | OUTPATIENT
Start: 2020-03-07 | End: 2020-03-08 | Stop reason: HOSPADM

## 2020-03-07 RX ORDER — ALUMINA, MAGNESIA, AND SIMETHICONE 2400; 2400; 240 MG/30ML; MG/30ML; MG/30ML
30 SUSPENSION ORAL EVERY 6 HOURS PRN
Status: DISCONTINUED | OUTPATIENT
Start: 2020-03-06 | End: 2020-03-08 | Stop reason: HOSPADM

## 2020-03-07 RX ORDER — LIDOCAINE HYDROCHLORIDE AND EPINEPHRINE 15; 5 MG/ML; UG/ML
INJECTION, SOLUTION EPIDURAL PRN
Status: DISCONTINUED | OUTPATIENT
Start: 2020-03-07 | End: 2020-03-08 | Stop reason: SURG

## 2020-03-07 RX ORDER — ROPIVACAINE HYDROCHLORIDE 2 MG/ML
INJECTION, SOLUTION EPIDURAL; INFILTRATION PRN
Status: DISCONTINUED | OUTPATIENT
Start: 2020-03-07 | End: 2020-03-08 | Stop reason: SURG

## 2020-03-07 RX ORDER — SODIUM CHLORIDE, SODIUM LACTATE, POTASSIUM CHLORIDE, CALCIUM CHLORIDE 600; 310; 30; 20 MG/100ML; MG/100ML; MG/100ML; MG/100ML
INJECTION, SOLUTION INTRAVENOUS
Status: COMPLETED
Start: 2020-03-07 | End: 2020-03-07

## 2020-03-07 RX ADMIN — OXYTOCIN 2 MILLI-UNITS/MIN: 10 INJECTION, SOLUTION INTRAMUSCULAR; INTRAVENOUS at 21:48

## 2020-03-07 RX ADMIN — SODIUM CHLORIDE, POTASSIUM CHLORIDE, SODIUM LACTATE AND CALCIUM CHLORIDE: 600; 310; 30; 20 INJECTION, SOLUTION INTRAVENOUS at 14:10

## 2020-03-07 RX ADMIN — MISOPROSTOL 25 MCG: 100 TABLET ORAL at 00:17

## 2020-03-07 RX ADMIN — ROPIVACAINE HYDROCHLORIDE 200 MG: 2 INJECTION, SOLUTION EPIDURAL; INFILTRATION at 13:59

## 2020-03-07 RX ADMIN — SODIUM CHLORIDE, POTASSIUM CHLORIDE, SODIUM LACTATE AND CALCIUM CHLORIDE: 600; 310; 30; 20 INJECTION, SOLUTION INTRAVENOUS at 21:39

## 2020-03-07 RX ADMIN — LIDOCAINE HYDROCHLORIDE,EPINEPHRINE BITARTRATE 5 ML: 15; .005 INJECTION, SOLUTION EPIDURAL; INFILTRATION; INTRACAUDAL; PERINEURAL at 13:51

## 2020-03-07 RX ADMIN — MISOPROSTOL 25 MCG: 100 TABLET ORAL at 10:01

## 2020-03-07 RX ADMIN — ROPIVACAINE HYDROCHLORIDE: 2 INJECTION, SOLUTION EPIDURAL; INFILTRATION; PERINEURAL at 21:37

## 2020-03-07 RX ADMIN — ROPIVACAINE HYDROCHLORIDE: 2 INJECTION, SOLUTION EPIDURAL; INFILTRATION at 21:37

## 2020-03-07 RX ADMIN — ROPIVACAINE HYDROCHLORIDE 10 ML: 2 INJECTION, SOLUTION EPIDURAL; INFILTRATION at 13:54

## 2020-03-07 ASSESSMENT — FIBROSIS 4 INDEX: FIB4 SCORE: 1.1

## 2020-03-07 NOTE — PROGRESS NOTES
2310: Patient of Dr. Joe presents to floor for IOL. Brock, Gestation 37.0.     Patient in bed, awake, RN at bedside, denies pain, pleasant affect. Denies LOF, UC's or HA/N/V.  FOB, [place], sister and  at Bedside. POC discussed, all questions answered.     Patient would like FOB, Sister, and  at bedside for delivery phase. Patient would like skin to skin, FOB to cut cord after pulsating, breast feeding with DBM as necessary.     EFM used, discussed and in place.     Patient and family deny questions regarding care since arriving at [facility]. Dry erase board updated with RN contact information, discussed. Patient and family encouraged to call RN with all questions, concerns and needs.     2330: IV started, Labs drawn.     2345: Call to Dr. Cerrato placed with update on SVE: Cl/Th/-3. Orders received. Cytotec placed by RAMIRO Borden RN. Patient resting comfortably with FOB,  and sister at BS.    0126: Per Dr. Cerrato, Pt ambulating in room/figueroa with FOB,  and Sister.     0218: Pt. Back in bed; monitors placed.     0430: RN at BS for SVE: FT/Th/-3. Call placed to Dr. Cerrato, update on POC. Per Dr. Cerrato, OK for her to ambulate in room/figueroa. Medicine ball brought in room and educated on use. Dr. Cerrato to evaluate in person in approximately 1.5 hrs.     0635: Dr. Cerrato at BS for SVE: FT/70/-3.     0700: Report given to RAMIRO Hill RN.

## 2020-03-07 NOTE — PROGRESS NOTES
MD L&D progress note     S: Pt feeling well, feeling regular contractions, no PIH symptoms    O:  Afebrile -145/91-96  FHR - 145, pos accels, neg decels, mod variability, cat 1 FHR  [place] - every 2-3 minutes  SVE - closed/70/-2     A: IUP at 37+1wks, IUGR, PIH without severe features, Cat 1 FHR, GBS neg     P: Continue labor induction and management, fetal monitoring/toco, IV fluids, consider balloon in cervix, monitor blood pressures closely

## 2020-03-07 NOTE — PROGRESS NOTES
OB Progress Note    More uncomfortable with contractions but declining pain intervention. Cat I tracing with baseline of 140's. Moderate variability. Accelerations present. Decelerations absent. [place] with contractions every 2 min. SVE 1/70/-2. Cooks balloon placed 80/80cc. Will monitor closely.

## 2020-03-07 NOTE — PROGRESS NOTES
Delayed Entry Note from visit at 10am    No discomfort with contractions. Cat I tracing with baseline of 130's. Moderate variability. Accelerations present. Decelerations absent. [place] with contractions every 2-3 min. SVE FT/70/-2. Misoprostol placed by myself. Will monitor closely.

## 2020-03-08 LAB
ERYTHROCYTE [DISTWIDTH] IN BLOOD BY AUTOMATED COUNT: 43.7 FL (ref 35.9–50)
HCT VFR BLD AUTO: 31.3 % (ref 37–47)
HGB BLD-MCNC: 10.6 G/DL (ref 12–16)
MCH RBC QN AUTO: 33 PG (ref 27–33)
MCHC RBC AUTO-ENTMCNC: 33.9 G/DL (ref 33.6–35)
MCV RBC AUTO: 97.5 FL (ref 81.4–97.8)
PLATELET # BLD AUTO: 116 K/UL (ref 164–446)
PMV BLD AUTO: 11.5 FL (ref 9–12.9)
RBC # BLD AUTO: 3.21 M/UL (ref 4.2–5.4)
WBC # BLD AUTO: 16.5 K/UL (ref 4.8–10.8)

## 2020-03-08 PROCEDURE — 10907ZC DRAINAGE OF AMNIOTIC FLUID, THERAPEUTIC FROM PRODUCTS OF CONCEPTION, VIA NATURAL OR ARTIFICIAL OPENING: ICD-10-PCS | Performed by: OBSTETRICS & GYNECOLOGY

## 2020-03-08 PROCEDURE — 88307 TISSUE EXAM BY PATHOLOGIST: CPT

## 2020-03-08 PROCEDURE — 3E0P7VZ INTRODUCTION OF HORMONE INTO FEMALE REPRODUCTIVE, VIA NATURAL OR ARTIFICIAL OPENING: ICD-10-PCS | Performed by: OBSTETRICS & GYNECOLOGY

## 2020-03-08 PROCEDURE — 700102 HCHG RX REV CODE 250 W/ 637 OVERRIDE(OP): Performed by: OBSTETRICS & GYNECOLOGY

## 2020-03-08 PROCEDURE — 10D17Z9 MANUAL EXTRACTION OF PRODUCTS OF CONCEPTION, RETAINED, VIA NATURAL OR ARTIFICIAL OPENING: ICD-10-PCS | Performed by: OBSTETRICS & GYNECOLOGY

## 2020-03-08 PROCEDURE — 700105 HCHG RX REV CODE 258: Performed by: OBSTETRICS & GYNECOLOGY

## 2020-03-08 PROCEDURE — 770002 HCHG ROOM/CARE - OB PRIVATE (112)

## 2020-03-08 PROCEDURE — 85027 COMPLETE CBC AUTOMATED: CPT

## 2020-03-08 PROCEDURE — 700111 HCHG RX REV CODE 636 W/ 250 OVERRIDE (IP): Performed by: ANESTHESIOLOGY

## 2020-03-08 PROCEDURE — 700102 HCHG RX REV CODE 250 W/ 637 OVERRIDE(OP)

## 2020-03-08 PROCEDURE — 700101 HCHG RX REV CODE 250: Performed by: ANESTHESIOLOGY

## 2020-03-08 PROCEDURE — 304965 HCHG RECOVERY SERVICES

## 2020-03-08 PROCEDURE — A9270 NON-COVERED ITEM OR SERVICE: HCPCS | Performed by: OBSTETRICS & GYNECOLOGY

## 2020-03-08 PROCEDURE — 700111 HCHG RX REV CODE 636 W/ 250 OVERRIDE (IP)

## 2020-03-08 PROCEDURE — 59409 OBSTETRICAL CARE: CPT

## 2020-03-08 PROCEDURE — 36415 COLL VENOUS BLD VENIPUNCTURE: CPT

## 2020-03-08 PROCEDURE — A9270 NON-COVERED ITEM OR SERVICE: HCPCS

## 2020-03-08 PROCEDURE — 0KQM0ZZ REPAIR PERINEUM MUSCLE, OPEN APPROACH: ICD-10-PCS | Performed by: OBSTETRICS & GYNECOLOGY

## 2020-03-08 PROCEDURE — 700111 HCHG RX REV CODE 636 W/ 250 OVERRIDE (IP): Performed by: OBSTETRICS & GYNECOLOGY

## 2020-03-08 PROCEDURE — 3E033VJ INTRODUCTION OF OTHER HORMONE INTO PERIPHERAL VEIN, PERCUTANEOUS APPROACH: ICD-10-PCS | Performed by: OBSTETRICS & GYNECOLOGY

## 2020-03-08 PROCEDURE — 303615 HCHG EPIDURAL/SPINAL ANESTHESIA FOR LABOR

## 2020-03-08 PROCEDURE — 700112 HCHG RX REV CODE 229: Performed by: OBSTETRICS & GYNECOLOGY

## 2020-03-08 PROCEDURE — 10H07YZ INSERTION OF OTHER DEVICE INTO PRODUCTS OF CONCEPTION, VIA NATURAL OR ARTIFICIAL OPENING: ICD-10-PCS | Performed by: OBSTETRICS & GYNECOLOGY

## 2020-03-08 RX ORDER — SODIUM CHLORIDE, SODIUM LACTATE, POTASSIUM CHLORIDE, CALCIUM CHLORIDE 600; 310; 30; 20 MG/100ML; MG/100ML; MG/100ML; MG/100ML
INJECTION, SOLUTION INTRAVENOUS PRN
Status: DISCONTINUED | OUTPATIENT
Start: 2020-03-08 | End: 2020-03-10 | Stop reason: HOSPADM

## 2020-03-08 RX ORDER — VITAMIN A ACETATE, BETA CAROTENE, ASCORBIC ACID, CHOLECALCIFEROL, .ALPHA.-TOCOPHEROL ACETATE, DL-, THIAMINE MONONITRATE, RIBOFLAVIN, NIACINAMIDE, PYRIDOXINE HYDROCHLORIDE, FOLIC ACID, CYANOCOBALAMIN, CALCIUM CARBONATE, FERROUS FUMARATE, ZINC OXIDE, CUPRIC OXIDE 3080; 12; 120; 400; 1; 1.84; 3; 20; 22; 920; 25; 200; 27; 10; 2 [IU]/1; UG/1; MG/1; [IU]/1; MG/1; MG/1; MG/1; MG/1; MG/1; [IU]/1; MG/1; MG/1; MG/1; MG/1; MG/1
1 TABLET, FILM COATED ORAL EVERY MORNING
Status: DISCONTINUED | OUTPATIENT
Start: 2020-03-08 | End: 2020-03-10 | Stop reason: HOSPADM

## 2020-03-08 RX ORDER — TERBUTALINE SULFATE 1 MG/ML
INJECTION, SOLUTION SUBCUTANEOUS
Status: COMPLETED
Start: 2020-03-08 | End: 2020-03-08

## 2020-03-08 RX ORDER — CARBOPROST TROMETHAMINE 250 UG/ML
250 INJECTION, SOLUTION INTRAMUSCULAR
Status: DISCONTINUED | OUTPATIENT
Start: 2020-03-08 | End: 2020-03-10 | Stop reason: HOSPADM

## 2020-03-08 RX ORDER — BISACODYL 10 MG
10 SUPPOSITORY, RECTAL RECTAL PRN
Status: DISCONTINUED | OUTPATIENT
Start: 2020-03-08 | End: 2020-03-10 | Stop reason: HOSPADM

## 2020-03-08 RX ORDER — METHYLERGONOVINE MALEATE 0.2 MG/ML
0.2 INJECTION INTRAVENOUS
Status: DISCONTINUED | OUTPATIENT
Start: 2020-03-08 | End: 2020-03-10 | Stop reason: HOSPADM

## 2020-03-08 RX ORDER — MISOPROSTOL 200 UG/1
TABLET ORAL
Status: COMPLETED
Start: 2020-03-08 | End: 2020-03-08

## 2020-03-08 RX ORDER — NITROGLYCERIN 20 MG/100ML
INJECTION INTRAVENOUS
Status: DISPENSED
Start: 2020-03-08 | End: 2020-03-08

## 2020-03-08 RX ORDER — DOCUSATE SODIUM 100 MG/1
100 CAPSULE, LIQUID FILLED ORAL 2 TIMES DAILY PRN
Status: DISCONTINUED | OUTPATIENT
Start: 2020-03-08 | End: 2020-03-10 | Stop reason: HOSPADM

## 2020-03-08 RX ORDER — MISOPROSTOL 200 UG/1
600 TABLET ORAL
Status: DISCONTINUED | OUTPATIENT
Start: 2020-03-08 | End: 2020-03-10 | Stop reason: HOSPADM

## 2020-03-08 RX ORDER — LIDOCAINE HYDROCHLORIDE 20 MG/ML
INJECTION, SOLUTION EPIDURAL; INFILTRATION; INTRACAUDAL; PERINEURAL PRN
Status: DISCONTINUED | OUTPATIENT
Start: 2020-03-08 | End: 2020-03-08 | Stop reason: SURG

## 2020-03-08 RX ORDER — ROPIVACAINE HYDROCHLORIDE 2 MG/ML
INJECTION, SOLUTION EPIDURAL; INFILTRATION; PERINEURAL
Status: COMPLETED
Start: 2020-03-08 | End: 2020-03-08

## 2020-03-08 RX ADMIN — ROPIVACAINE HYDROCHLORIDE: 2 INJECTION, SOLUTION EPIDURAL; INFILTRATION; PERINEURAL at 06:54

## 2020-03-08 RX ADMIN — FENTANYL CITRATE 100 MCG: 50 INJECTION, SOLUTION INTRAMUSCULAR; INTRAVENOUS at 09:00

## 2020-03-08 RX ADMIN — MISOPROSTOL 800 MCG: 200 TABLET ORAL at 10:50

## 2020-03-08 RX ADMIN — OXYTOCIN 125 ML/HR: 10 INJECTION, SOLUTION INTRAMUSCULAR; INTRAVENOUS at 11:21

## 2020-03-08 RX ADMIN — SODIUM CHLORIDE, SODIUM LACTATE, POTASSIUM CHLORIDE, CALCIUM CHLORIDE AND DEXTROSE MONOHYDRATE: 5; 600; 310; 30; 20 INJECTION, SOLUTION INTRAVENOUS at 06:53

## 2020-03-08 RX ADMIN — IBUPROFEN 800 MG: 800 TABLET, FILM COATED ORAL at 13:29

## 2020-03-08 RX ADMIN — FENTANYL CITRATE 50 MCG: 50 INJECTION, SOLUTION INTRAMUSCULAR; INTRAVENOUS at 09:50

## 2020-03-08 RX ADMIN — IBUPROFEN 800 MG: 800 TABLET, FILM COATED ORAL at 22:41

## 2020-03-08 RX ADMIN — LIDOCAINE HYDROCHLORIDE 3 ML: 20 INJECTION, SOLUTION EPIDURAL; INFILTRATION; INTRACAUDAL at 09:50

## 2020-03-08 RX ADMIN — DOCUSATE SODIUM 100 MG: 100 CAPSULE, LIQUID FILLED ORAL at 22:41

## 2020-03-08 RX ADMIN — TERBUTALINE SULFATE 0.25 MG: 1 INJECTION, SOLUTION SUBCUTANEOUS at 10:45

## 2020-03-08 RX ADMIN — ROPIVACAINE HYDROCHLORIDE: 2 INJECTION, SOLUTION EPIDURAL; INFILTRATION at 06:54

## 2020-03-08 ASSESSMENT — PAIN SCALES - GENERAL: PAIN_LEVEL: 1

## 2020-03-08 NOTE — L&D DELIVERY NOTE
"Delivery Summary    Cinthia Alba is a 35 yo  who presented at 37w1d for induction of labor for severe IUGR and preeclampsia without severe features. She was induced with misoprostol. She received a Cooks balloon. She was started on pitocin. She received an epidural for anesthesia. She underwent AROM with placement of an IUPC. She progress rapidly from 3cm to complete dilation where with pushing efforts she underwent a spontaneous vaginal delivery of a viable female infant \"Martines\" in ISAAC position with APGARS 8/9. The head delivered atraumatically. The anterior shoulder delivered with gentle downward pressure and the remainder of the body followed. The infant was placed on the maternal chest. The cord was clamped and cut after a 30 second delay. Pitocin was administered. When the placenta initially failed to deliver the perineum was examined and a second degree laceration was repaired with 3.0 chromic in the usual fashion. The placenta failed to deliver with gentle traction after thirty minutes. Pitocin was discontinued, terbutaline was administered and the placenta was manually extracted intact. The uterus firmed with fundal pressure and pitocin. Misoprostol was placed per rectum.     EBL: 500cc  Anesthesia: epidural  Complications: Retained placenta    Roldan Joe M.D.      "

## 2020-03-08 NOTE — ANESTHESIA POSTPROCEDURE EVALUATION
Patient: Cinthia Alba    Procedure Summary     Date:  03/07/20 Room / Location:      Anesthesia Start:  1346 Anesthesia Stop:  03/08/20 1013    Procedure:  Labor Epidural Diagnosis:      Scheduled Providers:   Responsible Provider:  Reba Jordan M.D.    Anesthesia Type:  epidural ASA Status:  2          Final Anesthesia Type: epidural  Last vitals  BP   Blood Pressure: 138/81    Temp   36.6 °C (97.9 °F)    Pulse   Pulse: 75   Resp     16   SpO2    99      Anesthesia Post Evaluation    Patient location during evaluation: PACU  Patient participation: complete - patient participated  Level of consciousness: awake and alert  Pain score: 1    Airway patency: patent  Anesthetic complications: no  Cardiovascular status: hemodynamically stable  Respiratory status: acceptable  Hydration status: euvolemic    PONV: none

## 2020-03-08 NOTE — ANESTHESIA TIME REPORT
Anesthesia Start and Stop Event Times     Date Time Event    3/7/2020 1346 Ready for Procedure     1346 Anesthesia Start    3/8/2020 1013 Anesthesia Stop        Responsible Staff  03/07/20 to 03/08/20    Name Role Begin End    Tigre Thapa M.D. Anesth 1346 1623    Dayan Thapa M.D. Anesth 1623 0703    Reba Jordan M.D. Anesth 0703 1013        Preop Diagnosis (Free Text):  Pre-op Diagnosis             Preop Diagnosis (Codes):    Post op Diagnosis  Intrauterine pregnancy      Premium Reason  E. Weekend    Comments:

## 2020-03-08 NOTE — ANESTHESIA QCDR
2019 [facility] Clinical Data Registry (for Quality Improvement)     Postoperative nausea/vomiting risk protocol (Adult = 18 yrs and Pediatric 3-17 yrs)- (430 and 463)  General inhalation anesthetic (NOT TIVA) with PONV risk factors: No  Provision of anti-emetic therapy with at least 2 different classes of agents: N/A  Patient DID NOT receive anti-emetic therapy and reason is documented in Medical Record: N/A    Multimodal Pain Management- (477)  Non-emergent surgery AND patient age >= 18: No  Use of Multimodal Pain Management, two or more drugs and/or interventions, NOT including systemic opioids:   Exception: Documented allergy to multiple classes of analgesics:     Smoking Abstinence (404)  Patient is current smoker (cigarette, pipe, e-cig, marijuanna): No  Elective Surgery:   Abstinence instructions provided prior to day of surgery:   Patient abstained from smoking on day of surgery:     Pre-Op Beta-Blocker in Isolated CABG (44)  Isolated CABG AND patient age >= 18: No  Beta-blocker admin within 24 hours of surgical incision:   Exception:of medical reason(s) for not administering beta blocker within 24 hours prior to surgical incision (e.g., not  indicated,other medical reason):     PACU assessment of acute postoperative pain prior to Anesthesia Care End- Applies to Patients Age = 18- (ABG7)  Initial PACU pain score is which of the following: < 7/10  Patient unable to report pain score: N/A    Post-anesthetic transfer of care checklist/protocol to PACU/ICU- (426 and 427)  Upon conclusion of case, patient transferred to which of the following locations: PACU/Non-ICU  Use of transfer checklist/protocol: Yes  Exclusion: Service Performed in Patient Hospital Room (and thus did not require transfer): N/A  Unplanned admission to ICU related to anesthesia service up through end of PACU care- (MD51)  Unplanned admission to ICU (not initially anticipated at anesthesia start time): No

## 2020-03-08 NOTE — ANESTHESIA PREPROCEDURE EVALUATION
Relevant Problems   NEURO   (+) Hx of abnormal cervical Pap smear       Physical Exam    Airway   Mallampati: II  TM distance: >3 FB  Neck ROM: full       Cardiovascular - normal exam  Rhythm: regular  Rate: normal  (-) murmur     Dental - normal exam         Pulmonary - normal exam  Breath sounds clear to auscultation     Abdominal    Neurological - normal exam                 Anesthesia Plan    ASA 2       Plan - epidural   Neuraxial block will be labor analgesia              Pertinent diagnostic labs and testing reviewed    Informed Consent:    Anesthetic plan and risks discussed with patient.

## 2020-03-08 NOTE — PROGRESS NOTES
Assumed care of PT. Pt appears anxious. Does have discomfort with UC's but denies needs.    1001 SVE FT/70 per edsall, meds placed per MAR.    1210 MD at , balloon placed. 80/80ml.    1345 PT sitting up for epidural.    1900 Report to MD.

## 2020-03-08 NOTE — PROGRESS NOTES
OB Progress    Comfortable with epidural. Cat 1 strip with baseline of 140's. Moderate variability present. Accelerations present. Decelerations absent. Tocometer with contractions every 2 min. SVE 5/C/-1. AROM bloody fluid. IUPC placed. Continue pitocin. Monitor closely.

## 2020-03-08 NOTE — PROGRESS NOTES
1900- Report received from RAMIRO Hill. Pt comfortable with epidural.   2148- Pitocin started with balloon,   0015- Balloon removed, SVE 1-2/70/0. Orders to cont increasing pitocin.

## 2020-03-09 LAB — PATHOLOGY CONSULT NOTE: NORMAL

## 2020-03-09 PROCEDURE — 700102 HCHG RX REV CODE 250 W/ 637 OVERRIDE(OP): Performed by: OBSTETRICS & GYNECOLOGY

## 2020-03-09 PROCEDURE — A9270 NON-COVERED ITEM OR SERVICE: HCPCS | Performed by: OBSTETRICS & GYNECOLOGY

## 2020-03-09 PROCEDURE — 770002 HCHG ROOM/CARE - OB PRIVATE (112)

## 2020-03-09 RX ADMIN — VITAMIN A, VITAMIN C, VITAMIN D-3, VITAMIN E, VITAMIN B-1, VITAMIN B-2, NIACIN, VITAMIN B-6, CALCIUM, IRON, ZINC, COPPER 1 TABLET: 4000; 120; 400; 22; 1.84; 3; 20; 10; 1; 12; 200; 27; 25; 2 TABLET ORAL at 06:37

## 2020-03-09 RX ADMIN — IBUPROFEN 800 MG: 800 TABLET, FILM COATED ORAL at 06:37

## 2020-03-09 RX ADMIN — IBUPROFEN 800 MG: 800 TABLET, FILM COATED ORAL at 15:47

## 2020-03-09 ASSESSMENT — EDINBURGH POSTNATAL DEPRESSION SCALE (EPDS)
I HAVE FELT SAD OR MISERABLE: NO, NOT AT ALL
THE THOUGHT OF HARMING MYSELF HAS OCCURRED TO ME: NEVER
I HAVE LOOKED FORWARD WITH ENJOYMENT TO THINGS: AS MUCH AS I EVER DID
I HAVE FELT SCARED OR PANICKY FOR NO GOOD REASON: NO, NOT AT ALL
I HAVE BEEN ANXIOUS OR WORRIED FOR NO GOOD REASON: NO, NOT AT ALL
I HAVE BEEN SO UNHAPPY THAT I HAVE HAD DIFFICULTY SLEEPING: NOT AT ALL
I HAVE BEEN SO UNHAPPY THAT I HAVE BEEN CRYING: NO, NEVER
THINGS HAVE BEEN GETTING ON TOP OF ME: NO, I HAVE BEEN COPING AS WELL AS EVER
I HAVE BEEN ABLE TO LAUGH AND SEE THE FUNNY SIDE OF THINGS: AS MUCH AS I ALWAYS COULD
I HAVE BLAMED MYSELF UNNECESSARILY WHEN THINGS WENT WRONG: NO, NEVER

## 2020-03-09 NOTE — PROGRESS NOTES
0700  Report from NOC RN in room with pt at this time.  Pt reports having mild cramping, position change and epidural bolus used at this time.  0810  Dr. Joe in room, SVE with cervical change made and AROM=clear fluid.  0830  IV pitocin turned down and pt reports having more pain with UC's.  Epidural bolus button used again at this time.  0900  IV medications given for pain as the anesthesiologist is unable to come into the room for assessment.  0910  SVE with complete dilation.  Delivery prep and pt pushing as needed with  at side.  1013  Delivery of viable baby girl with apgars 4/9.  Second degree laceration repaired at this time and awaiting placenta.  1050  Manual removal of placenta following a dose of terbutaline.  Cytotec placed related to the terbutaline at this time by Dr. Joe.  1115  Pt does not have urgency to void yet, epidural removed and FF with light lochia.  1230  Pt eating lunch at this time and does not desire any pain medications.  Pt has FF with light lochia.  1330  Pt continues waiting urgency to void, PO medications given for pain at this time.  1430  Pt up to BR, voided and pericare performed at this time.  1600  Pt transferred to PP and report to PP RN.

## 2020-03-09 NOTE — CONSULTS
Lactation note:  Initial visit. Infant has had low sugars and has received glucose gel and supplemented donor milk.  This is an IVF pregnancy due to father's issues.  Discussed normal  feeding behaviors and normal course of breastfeeding at 12-24-48-72 hours, and what to expect. Discussed importance of offering breast with feeding cues or at least every 2-3 hours, and even if infant shows no interest, can do hand expression into infant's lips. Showed MOB how to hand express colostrum. Encouraged to continue doing skin to skin. Discussed signs of an ideal deep latch, voiding and stooling patterns, feeding cues, stomach size, and importance of establishing milk supply with frequency of feedings.    Plan for tonight is to continue to offer breast first, if not latching well, can hand express colostrum, and refeed to infant.    Encouraged her to continue to work on deep latch, and skin 2 skin, with hand expression.    Assisted with latch to right breast, in football hold. Encouraged using pillows for support and positioning tummy to tummy, nipple to nose. Mother did feel infant latch, but it appeared shallow. Encouraged to relatch deeper. Mother able to break latch and work on getting infant deeper. Infant did latch, but fell asleep shortly at breast. Left infant skin to skin.    Due to infant's size, did discuss pumping with mother, to help surge milk supply if infant not sustaining the latch for long, and any expressed milk can be fed back to infant.     Discussed that feeding plan can change daily, and is not set in stone, just depends on what infant needs.     Discussed plan with Christine CHADWICK.      Information and phone numbers to the Lactation [facility] provided & invited to breastfeeding circles.    MOB has no other questions or concerns regarding breastfeeding. Encouraged to call for assistance as needed.

## 2020-03-09 NOTE — PROGRESS NOTES
Initiated use of breastpump with pt for infant feedings due to short feedings and increased risk d/t SGA status. 10ml colostrum obtained during first pumping session. Instructed pt and  in breastpump settings, initiation of milk supply, cleaning of breastpump parts, storage of breastmilk, supplementing with mom's expressed breastmilk, and collection of milk. Pt and  verbalize and demonstrate appropriate understanding, continuing to monitor and offer lactation support as needed. Yvette Manriquez R.N.

## 2020-03-09 NOTE — PROGRESS NOTES
Report received from Lluy DIETRICH&SAVAGE CHADWICK. Pt arrived to the unit at 1600 via wheelchair with RN. A/O x4. VSS. Responds appropriately. Denies pain, SOB. Assessment complete. Fundus firm, lochia light. [place] in the crib. Arm bands checked and verified. Discussed POC, monitor VS, pain control,  care, mobility, safety, DC planning, pt verbalizes understanding.Call light and belongings within reach. Bed in the lowest position. Treaded socks in place. Hourly rounding in progress. Will continue to monitor .

## 2020-03-09 NOTE — CARE PLAN
Problem: Discharge Barriers/Planning  Goal: Patient's continuum of care needs will be met  Outcome: MET   POC discussed, pt understands the POC and just wanted to know about when she would be going home following delivery.    Problem: Pain Management  Goal: Pain level will decrease to patient's comfort goal  Outcome: MET   Pt received an epidural and that is working for her well, will treat as needed throughout her labor process.

## 2020-03-09 NOTE — CARE PLAN
Problem: Bowel/Gastric:  Goal: Normal bowel function is maintained or improved  Outcome: PROGRESSING AS EXPECTED     Problem: Potential knowledge deficit related to lack of understanding of self and  care  Goal: Patient will verbalize understanding of self and infant care  Outcome: PROGRESSING AS EXPECTED

## 2020-03-09 NOTE — DISCHARGE PLANNING
Discharge Planning Assessment Post Partum    Reason for Referral: History of depression and anxiety  Address: [address] [place] [address] [place] [address]  Phone: [phone]  Type of Living Situation: living with FOB  Mom Diagnosis: Pregnancy  Baby Diagnosis: Holloway  Primary Language: English    Name of Baby: Conrad Alba (: 3/8/20)  Father of the Baby: Gildardo Alba  Involved in baby’s care? Yes  Contact Information: [phone]    Prenatal Care: Yes  Mom's PCP: Dr. Black  PCP for new baby: Dr. Martinez    Support System: FOB, family, and friends  Coping/Bonding between mother & baby: Yes  Source of Feeding: breast feeding  Supplies for Infant: prepared for infant; denies any needs    Mom's Insurance: [employer]   Baby Covered on Insurance:Yes  Mother Employed/School: [employer]  Other children in the home/names & ages: none, 1st baby    Financial Hardship/Income: denies   Mom's Mental status: alert and oriented  Services used prior to admit: none    CPS History: No  Psychiatric History: history of depression and anxiety  Domestic Violence History: No  Drug/ETOH History: No    Resources Provided: Met with parents and offered resources.  MOB states they are well-prepared and denies needing anything.  Parents have good support from family and friends.  Referrals Made: none     Clearance for Discharge: Infant is cleared to discharge home with parents.

## 2020-03-09 NOTE — CARE PLAN
Problem: Safety  Goal: Will remain free from injury  Outcome: PROGRESSING AS EXPECTED  Note: Treaded socks in place, bed in the lowest position, call light and belongings within reach, pt call for assistance appropriately      Problem: Knowledge Deficit  Goal: Knowledge of disease process/condition, treatment plan, diagnostic tests, and medications will improve  Outcome: PROGRESSING AS EXPECTED  Note: Educated mom on POC, all questions answered, hourly rounding in progress     Problem: Altered physiologic condition related to immediate post-delivery state and potential for bleeding/hemorrhage  Goal: Patient physiologically stable as evidenced by normal lochia, palpable uterine involution and vital signs within normal limits  Outcome: PROGRESSING AS EXPECTED  Note: VSS, fundus firm, lochia light

## 2020-03-09 NOTE — CARE PLAN
Problem: Altered physiologic condition related to immediate post-delivery state and potential for bleeding/hemorrhage  Goal: Patient physiologically stable as evidenced by normal lochia, palpable uterine involution and vital signs within normal limits  Outcome: PROGRESSING AS EXPECTED  Intervention: Perform physical assessment and obtain vital signs on patient as directed in Intrapartum/Postpartum Standard of Care in Policy and Procedure manual  Note: Fundus firm, lochia light without clots, pt performing precious care and pad changes independently, tolerating well     Problem: Alteration in comfort related to episiotomy, vaginal repair and/or after birth pains  Goal: Patient is able to ambulate, care for self and infant  Outcome: PROGRESSING AS EXPECTED  Intervention: Administer pain meds as requested by patient and ordered by MD/DO/CNM  Note: Pt medicated with ibuprofen for cramping pain with good relief, using dermoplast spray and ice packs for perineal pain with adequate relief, pt able to ambulate in room and care for self and infant without difficulty

## 2020-03-09 NOTE — PROGRESS NOTES
Post Partum Progress Note    Name:   Cinthia Alba   Date/Time:  3/9/2020 - 7:43 AM  Chief Admitting Dx:  Pregnancy  Indication for care in labor or delivery  Indication for care in labor or delivery  Delivery Type:  vaginal, spontaneous  Post-Op/Post Partum Days #:  1    Subjective:  Ambulating, voiding, tolerating diet, normal lochia. Pain controlled. Pumping.     Vitals:    03/08/20 2000 03/08/20 2200 03/09/20 0200 03/09/20 0600   BP: 152/94 157/99 120/77 137/83   Pulse: 70 73 65 60   Resp: 20 18 18 18   Temp: 36.9 °C (98.4 °F) 37.1 °C (98.8 °F) 36.7 °C (98.1 °F) 36.7 °C (98 °F)   TempSrc: Temporal Temporal Temporal Temporal   SpO2: 95% 96% 95% 97%   Weight:       Height:           Exam:  Gen: no acute distress  Abd: soft nt/nd firm fundus  Ext: no calf pain ag LE    Meds:  Current Facility-Administered Medications   Medication Dose   • LR infusion     • miSOPROStol (CYTOTEC) tablet 600 mcg  600 mcg   • methylergonovine (METHERGINE) injection 0.2 mg  0.2 mg   • carboPROST (HEMABATE) injection 250 mcg  250 mcg   • docusate sodium (COLACE) capsule 100 mg  100 mg   • bisacodyl (DULCOLAX) suppository 10 mg  10 mg   • magnesium hydroxide (MILK OF MAGNESIA) suspension 30 mL  30 mL   • prenatal plus vitamin (STUARTNATAL 1+1) 27-1 MG tablet 1 Tab  1 Tab   • ondansetron (ZOFRAN ODT) dispertab 4 mg  4 mg    Or   • ondansetron (ZOFRAN) syringe/vial injection 4 mg  4 mg   • oxytocin (PITOCIN) infusion (for postpartum)   mL/hr   • ibuprofen (MOTRIN) tablet 800 mg  800 mg   • HYDROcodone-acetaminophen (NORCO) 5-325 MG per tablet 1 Tab  1 Tab   • HYDROcodone/acetaminophen (NORCO)  MG per tablet 1 Tab  1 Tab       Labs:   Recent Labs     03/06/20  2345 03/08/20  2147   WBC 7.7 16.5*   RBC 3.89* 3.21*   HEMOGLOBIN 12.8 10.6*   HEMATOCRIT 37.1 31.3*   MCV 95.4 97.5   MCH 32.9 33.0   MCHC 34.5 33.9   RDW 42.5 43.7   PLATELETCT 180 116*   MPV 11.3 11.5       Assessment:  Ppd 1  Plan:  Continue routine post  partum care.  Anticipate dc tomorrow.     Vero Moura M.D.

## 2020-03-10 VITALS
SYSTOLIC BLOOD PRESSURE: 132 MMHG | BODY MASS INDEX: 29.59 KG/M2 | OXYGEN SATURATION: 96 % | TEMPERATURE: 97.8 F | HEIGHT: 63 IN | HEART RATE: 79 BPM | DIASTOLIC BLOOD PRESSURE: 80 MMHG | WEIGHT: 167 LBS | RESPIRATION RATE: 18 BRPM

## 2020-03-10 PROCEDURE — 700112 HCHG RX REV CODE 229: Performed by: OBSTETRICS & GYNECOLOGY

## 2020-03-10 PROCEDURE — A9270 NON-COVERED ITEM OR SERVICE: HCPCS | Performed by: OBSTETRICS & GYNECOLOGY

## 2020-03-10 PROCEDURE — 700102 HCHG RX REV CODE 250 W/ 637 OVERRIDE(OP): Performed by: OBSTETRICS & GYNECOLOGY

## 2020-03-10 RX ORDER — IBUPROFEN 800 MG/1
800 TABLET ORAL EVERY 8 HOURS PRN
Qty: 30 TAB | Refills: 1 | Status: SHIPPED | OUTPATIENT
Start: 2020-03-10 | End: 2020-03-25

## 2020-03-10 RX ADMIN — DOCUSATE SODIUM 100 MG: 100 CAPSULE, LIQUID FILLED ORAL at 00:02

## 2020-03-10 RX ADMIN — IBUPROFEN 800 MG: 800 TABLET, FILM COATED ORAL at 00:02

## 2020-03-10 NOTE — LACTATION NOTE
This note was copied from a baby's chart.  Baby 37.2 weeks, IUGR, birth weight 4#14, weight loss 1.36% @ 10.5 hours, MOB severe Prreeclampsia. Mother reports she has been latching & breastfeeding, when baby cues or by 3-4 hours from last feed. Mother has also been hand expressing & spoon or syringe feeding after breastfeeding. Reinforced hand expressing & feeding back after each breastfeed, to provide more colostrum to baby & promote more stimulation to breasts.     Teaching on hunger cues, breastfeeding when baby shows cues or by 3-4 hours from last feed, importance of skin to skin.     Lactation to follow-up as needed.    Breastfeeding plan for tonight:  Keep baby skin to skin & breastfeed when baby shows hunger cues or by 3-4 hours from last feed, hand express & feed back after each breastfeed.

## 2020-03-10 NOTE — CARE PLAN
Problem: Communication  Goal: The ability to communicate needs accurately and effectively will improve  Outcome: MET     Problem: Safety  Goal: Will remain free from injury  Outcome: MET  Goal: Will remain free from falls  Outcome: MET     Problem: Infection  Goal: Will remain free from infection  Outcome: MET     Problem: Venous Thromboembolism (VTW)/Deep Vein Thrombosis (DVT) Prevention:  Goal: Patient will participate in Venous Thrombosis (VTE)/Deep Vein Thrombosis (DVT)Prevention Measures  Outcome: MET     Problem: Bowel/Gastric:  Goal: Normal bowel function is maintained or improved  Outcome: MET  Goal: Will not experience complications related to bowel motility  Outcome: MET     Problem: Knowledge Deficit  Goal: Knowledge of disease process/condition, treatment plan, diagnostic tests, and medications will improve  Outcome: MET  Goal: Knowledge of the prescribed therapeutic regimen will improve  Outcome: MET     Problem: Fluid Volume:  Goal: Will maintain balanced intake and output  Outcome: MET     Problem: Altered physiologic condition related to immediate post-delivery state and potential for bleeding/hemorrhage  Goal: Patient physiologically stable as evidenced by normal lochia, palpable uterine involution and vital signs within normal limits  Outcome: MET     Problem: Potential for postpartum infection related to presence of episiotomy/vaginal tear and/or uterine contamination  Goal: Patient will be absent from signs and symptoms of infection  Outcome: MET     Problem: Alteration in comfort related to episiotomy, vaginal repair and/or after birth pains  Goal: Patient is able to ambulate, care for self and infant  Outcome: MET  Goal: Patient verbalizes acceptable pain level  Outcome: MET     Problem: Potential knowledge deficit related to lack of understanding of self and  care  Goal: Patient will verbalize understanding of self and infant care  Outcome: MET  Goal: Patient will demonstrate ability  to care for self and infant  Outcome: MET     Problem: Potential anxiety related to difficulty adapting to parental role  Goal: Patient will verbalize and demonstrate effective bonding and parenting behavior  Outcome: MET

## 2020-03-10 NOTE — DISCHARGE INSTRUCTIONS
POSTPARTUM DISCHARGE INSTRUCTIONS FOR MOM    YOB: 1985   Age: 34 y.o.               Admit Date: 3/6/2020     Discharge Date: 3/10/2020  Attending Doctor:  Roldan Joe M.D.                  Allergies:  Patient has no known allergies.    Discharged to home by car. Discharged via wheelchair, hospital escort: Yes.  Special equipment needed: Not Applicable  Belongings with: Personal  Be sure to schedule a follow-up appointment with your primary care doctor or any specialists as instructed.     Discharge Plan:   Diet Plan: Discussed  Activity Level: Discussed  Confirmed Follow up Appointment: Patient to Call and Schedule Appointment  Confirmed Symptoms Management: Discussed  Medication Reconciliation Updated: Yes  Influenza Vaccine Indication: Not indicated: Previously immunized this influenza season and > 8 years of age    REASONS TO CALL YOUR OBSTETRICIAN:  1.   Persistent fever or shaking chills (Temperature higher than 100.4)  2.   Heavy bleeding (soaking more than 1 pad per hour); Passing clots  3.   Foul odor from vagina  4.   Mastitis (Breast infection; breast pain, chills, fever, redness)  5.   Urinary pain, burning or frequency  6.   Episiotomy infection  7.   Abdominal incision infection  8.   Severe depression longer than 24 hours    HAND WASHING  · Prior to handling the baby.  · Before breastfeeding or bottle feeding baby.  · After using the bathroom or changing the baby's diaper.    WOUND CARE  Ask your physician for additional care instructions.  In general:    ·  Incision:      · Keep clean and dry.    · Do NOT lift anything heavier than your baby for up to 6 weeks.    · There should not be any opening or pus.      VAGINAL CARE  · Nothing inside vagina for 6 weeks: no sexual intercourse, tampons or douching.  · Bleeding may continue for 2-4 weeks.  Amount may vary.    · Call your physician for heavy bleeding which means soaking more than 1 pad per hour    BIRTH CONTROL  · It is  "possible to become pregnant at any time after delivery and while breastfeeding.  · Plan to discuss a method of birth control with your physician at your follow up visit. visit.    DIET AND ELIMINATION  · Eating more fiber (bran cereal, fruits, and vegetables) and drinking plenty of fluids will help to avoid constipation.  · Urinary frequency after childbirth is normal.    POSTPARTUM BLUES  During the first few days after birth, you may experience a sense of the \"blues\" which may include impatience, irritability or even crying.  These feeling come and go quickly.  However, as many as 1 in 10 women experience emotional symptoms known as postpartum depression.    Postpartum depression:  May start as early as the second or third day after delivery or take several weeks or months to develop.  Symptoms of \"blues\" are present, but are more intense:  Crying spells; loss of appetite; feelings of hopelessness or loss of control; fear of touching the baby; over concern or no concern at all about the baby; little or no concern about your own appearance/caring for yourself; and/or inability to sleep or excessive sleeping.  Contact your physician if you are experiencing any of these symptoms.    Crisis Hotline:  · [place] Crisis Hotline:  3-606-KCBSRTV  Or 1-189.199.4345  · [place] Crisis Hotline:  1-557.505.9272  Or 292-503-1375    PREVENTING SHAKEN BABY:  If you are angry or stressed, PUT THE BABY IN THE CRIB, step away, take some deep breaths, and wait until you are calm to care for the baby.  DO NOT SHAKE THE BABY.  You are not alone, call a supporter for help.    · [facility] Center 24/7 crisis line 512-222-9448 or 1-248.426.6938  · You can also text them, text \"ANSWER\" to 234933    QUIT SMOKING/TOBACCO USE:  I understand the use of any tobacco products increases my chance of suffering from future heart disease and could cause other illnesses which may shorten my life. Quitting the use of tobacco products is the single most " important thing I can do to improve my health. For further information on smoking / tobacco cessation call a Toll Free Quit Line at 1-775.201.6495 (*[employer] [place]) or 1-783.894.9075 (American Lung Association) or you can access the web based program at www.lungusa.org.    · [place] Tobacco Users Help Line:  (469) 462-2074       Toll Free: 1-750.197.3908  · Quit Tobacco Program [facility] (965)465-8849    DEPRESSION / SUICIDE RISK:  As you are discharged from this [facility], it is important to learn how to keep safe from harming yourself.    Recognize the warning signs:  · Abrupt changes in personality, positive or negative- including increase in energy   · Giving away possessions  · Change in eating patterns- significant weight changes-  positive or negative  · Change in sleeping patterns- unable to sleep or sleeping all the time   · Unwillingness or inability to communicate  · Depression  · Unusual sadness, discouragement and loneliness  · Talk of wanting to die  · Neglect of personal appearance   · Rebelliousness- reckless behavior  · Withdrawal from people/activities they love  · Confusion- inability to concentrate     If you or a loved one observes any of these behaviors or has concerns about self-harm, here's what you can do:  · Talk about it- your feelings and reasons for harming yourself  · Remove any means that you might use to hurt yourself (examples: pills, rope, extension cords, firearm)  · Get professional help from the community (Mental Health, Substance Abuse, psychological counseling)  · Do not be alone:Call your Safe Contact- someone whom you trust who will be there for you.  · Call your local CRISIS HOTLINE 164-5616 or 652-623-7130  · Call your local Children's Mobile Crisis Response Team [place] (052) 885-1892 or www.Mbite  · Call the toll free National Suicide Prevention Hotlines   · National Suicide Prevention Lifeline 418-778-LAAZ  (0400)  · [facility] 800-SUICIDE (896-3947)    DISCHARGE SURVEY:  Thank you for choosing [facility].  We hope we provided you with very good care.  You may be receiving a survey in the mail.  Please fill it out.  Your opinion is valuable to us.    ADDITIONAL EDUCATIONAL MATERIALS GIVEN TO PATIENT:        My signature on this form indicates that:  1.  I have reviewed and understand the above information  2.  My questions regarding this information have been answered to my satisfaction.  3.  I have formulated a plan with my discharge nurse to obtain my prescribed medication for home.

## 2020-03-10 NOTE — CARE PLAN
Problem: Altered physiologic condition related to immediate post-delivery state and potential for bleeding/hemorrhage  Goal: Patient physiologically stable as evidenced by normal lochia, palpable uterine involution and vital signs within normal limits  Outcome: PROGRESSING AS EXPECTED  Intervention: Perform physical assessment and obtain vital signs on patient as directed in Intrapartum/Postpartum Standard of Care in Policy and Procedure manual  Note: Fundus firm, lochia light without clots, pt performing precious care independently without difficulty     Problem: Alteration in comfort related to episiotomy, vaginal repair and/or after birth pains  Goal: Patient is able to ambulate, care for self and infant  Outcome: PROGRESSING AS EXPECTED  Intervention: Assess 0-10 pain level with vital signs  Note: Pt declines need for pain medication at this time, requests ibuprofen to be given as available for inflammation and cramping pain, reports adequate relief with ibuprofen and is able to ambulate and care for self and infant without difficulty

## 2020-03-11 NOTE — DISCHARGE SUMMARY
DATE OF ADMISSION:  03/06/2020    DATE OF DISCHARGE:  03/10/2020    ADMITTING DIAGNOSES:  1.  Intrauterine pregnancy at 37 weeks and 1 day.  2.  Intrauterine growth restriction.  3.  Preeclampsia without severe features.    ADMITTING DIAGNOSES:  1.  Intrauterine pregnancy at 37 weeks and 1 day.  2.  Intrauterine growth restriction.  3.  Preeclampsia without severe features.  4.  Status post normal spontaneous vaginal delivery.    HOSPITAL COURSE:  The patient was admitted and underwent vaginal delivery,   uncomplicated.  She is postpartum day #2 and stable for discharge home.  Her   postpartum hemoglobin is stable at 10.6.  Her blood pressures have been stable   with systolics between 130s and 140s.  She has a prescription written for   Motrin.  She is instructed to follow up with Dr. Joe in 6 weeks' time.       ____________________________________     Corinne E. Capurro, MD [facility] / [facility]    DD:  03/10/2020 06:37:32  DT:  03/11/2020 02:11:40    D#:  [id number]  Job#:  [id number]

## 2020-03-20 ENCOUNTER — OFFICE VISIT (OUTPATIENT)
Dept: OBGYN | Facility: CLINIC | Age: 35
End: 2020-03-20
Payer: COMMERCIAL

## 2020-03-20 DIAGNOSIS — O92.5 LACTATION SUPPRESSION: ICD-10-CM

## 2020-03-20 PROCEDURE — 99354 PR PROLONGED SVC OUTPATIENT SETTING 1ST HOUR: CPT | Performed by: NURSE PRACTITIONER

## 2020-03-20 PROCEDURE — 99205 OFFICE O/P NEW HI 60 MIN: CPT | Performed by: NURSE PRACTITIONER

## 2020-03-21 NOTE — PROGRESS NOTES
Subjective:       Cinthia Alba is a 34 y.o. female here to establish lactation care. She is here today with baby and  (Gildardo).    Concerns:   Latch on difficulties , feeling that there is not enough milk  and baby always seems hungry  HPI:   Pertinent  history:   Mother does not have a history of advanced maternal age, GDM, hypertension prior to pregnancy, insulin resistance, multiple gestation, PCOS and thyroid disease. These are common conditions which may interfere in establishing milk supply.    Mother does have history of anxiety and depression. This is a common condition which may interfere in establishing milk supply.    Breast changes in pregnancy: Yes  History of breast surgeries: No      FEEDING HISTORY:    Past breastfeeding history:  First baby   Hospital course: 37.2 and IUGR, seen by LC and taught hunger cues and feed by 304 hours from last feed.No pumping  Currently: Mom has continued exclusive breast-feeding, minimal pumping and dad is covering and late night feeding providing 60-90 mils of formula.  Mom is exhausted from nursing all day long, has no time to pump and is looking for a plan  Both breasts: Yes  Bottle feeds: 1-2x/24h      Supplement: Expressed breast milk and Formula  Quanity: 30-90ml  How given/devices:  Bottle    Nipple Shield Use: None    Breast Pumping:    Not pumping often  Frequency: when she can  Type of pump:  Medela  Flange size/type: 24mm  NO pain with pumping    ROS:  Constitutional:   no fever, chills. Feeling well  Extremities Swelling: No extremity swelling  Gastrointestinal:  Negative for nausea, vomiting,   Breasts: No soreness of breasts and No soreness of nipples  Psychiatric: Feels exhausted and Managing ok  Mental Health:  NOT Feeling irritable, agitated, angry, overwhelmed, apathy, exhaustion  NOT having sleep changes, appetite changes       Objective:     General: no acute distress  Neurological:  Alert and oriented x3  Breasts:    Nipples: intact  Psychiatric:  Normal mood and affect. Her behavior is normal. Judgment and thought content normal   Mental Health:  Sadness, crying, feeling overwhelmed, agitation, hypervigilance NOT exhibited     Assessment/Plan & Lactation Counseling:     Infant Weight History:   3/8/20 4#14.0oz  3/20/20 5#6.4oz  Infant intake at Breast:: L 3ml     R 21ml    Total 24ml  Milk Transfer at this feeding:   Ineffective breastfeeding; not able to transfer a full feed from breast r/t being tired for baby  Pumped: Type of Pump: Hospital grade    Quantity Pumped: L 18    R 8    Total 26ml  Initiation of Feeding: Infant initiates  Position of Feeding:    Right: football  Left: football  Attachment Achieved: rapidly but spent time learning some tweaks and strategies  Nipple shield: N/A     Suck Pattern at the breast: Suck burst and normal rest  Suck Pattern on the bottle: Suck burst and normal rest  Behavior Following Observed Feeding: sleeping  Latch: Mom latches independently and Assisted latch  Suckling/Feeding: attaches, audible swallows, baby falls asleep, elicits MARION, frequent pauses and intermittent swallows  Milk Supply Available: low  Low milk supply:   Likely due to: ineffective or infrequent breast stimulation or milk removal    Diagnosis/Problem  Lactation suppression      Discussed concerns and symptoms as listed above in assessment and guidance summarized below.  Topics reviewed included:  •  Herbs and medications for increasing supply and their potential side effects and efficacy. No evidence base exists to support their use  •  Triple feeding and its sustainability and its impact on the mother baby relationship  •  Maternal Mental Health: Exclusive breast-feeding all day long is not sustainable, can feed baby well and establish milk supply and help baby learn breast-feeding while at the same time helping mom feel more control over the daily pattern  •  Sleep or lack of and strategies to manage restorative  sleep, although short amounts, significant to the mental health of the mother.   •  The SGA (Small for gestational age) or IUGR baby is often poky at the breast, falling asleep, wants to  feed few times per day, falls asleep at the bottle too.  They need time to grow so supply must be supported as well as infant growth.   •  Milk supply is dependent on how many times the baby removes milk and how well the breasts are emptied in a 24 hour period. This is a biological reality that we can't work around. If, for any reason, your baby is not latching, or you are not able to nurse, then it is important for you to remove the milk instead by pumping or hand expression.  There's no magic trick, tea, cookie or supplement that will maintain your milk supply. One  must  effectively remove milk to continue to make and maximize milk. In the early days and weeks that can be 8+ times in 24 hours. For older babies, on average 6-7 + times in 24 hours.    •  Feeding: Feed your baby every 2-3 hours, more often if baby acts hungry. Awaken baby for feeding if going over 3 hours in the day. Need to get in 8-12 feedings per 24 hours.   •  Given infants weight you may allow baby to go ONE longer feeding at night but that generally means shorter durations in the day.  •  Supplement: Quantity as you have been doing, distribution, making a decision each feeding what you are able to do  o Remember, baby is getting plenty of milk in 24 hours evidenced by her weight gain.  What we are changing is how that milk is distributed so that mom can increase her milk supply with pumping at the end of the feeding and that we make family less exhausted to do better at each feeding  ? Paced Bottle Feeding Video: https://www.[employer].com/watch?v=VdKGX3aGO7R  Positioning Techniques for bare breast  o Suggested positions Cross cradle  o Fine tune position by making sure your fingers beneath the breast as well as your bra, are out of the way of your baby's  "chin.  o Positioning:  Many positions shown, great sidelying at 7 minutes.   o See http://globalhealthmedia.org/portfolio-items/positions-for-breastfeeding/?usawkebzmPM=07138  •  Latch on Techniques for bare breast  o Fine tune latch:  - By holding your baby more securely at the breast, assisting your baby to stay attached by:  - Bringing your baby to your breast, not breast to the baby  - Your baby's cheek to touch breast securely, nose tipped back  - Hold your baby firmly in place so when your baby forgets to suck and picks it back up again your baby is in the correct spot. You will be extinguishing behavior and replacing it with a deeper latch to stimulate suck and provide satisfaction at the breast  - Your baby needs as much breast as deep in the mouth as possible to allow your nipples to heal and for you more importantly to maximize efficiency at the breast  • Latch is asymmetrical, leading with the chin, getting more underneath.  •  Triple feeding: A short term solution: Breast/bottle/pump:  o FIRST decide what you can do at that feeding and you may decide to double feed -pump and bottle, if you are going to offer the breast at that feeding:  - nurse first and limit time on the breasts to 20+/- min total  - finish with bottle  - pump afterwards to finish emptying your breasts which will help increase milk supply  - use your own pumped milk, formula or donor human milk  - 30gm or ml = 1oz  •  Pumping guidelines:  o Both breasts using \"Hands-on Pumping\" for 10-15 minutes to stimulate milk production. See video at : http://newborns.[employer].edu/Breastfeeding/MaxProduction.html.  o Pumping is effective but can quickly exhaust and overwhelm a new mother  o Pump after a feeding or instead of a feeding as frequently as it works for your day   o type of pump:  - Personal   - Always double pump  o How long will vary woman to woman, typically 8-15 minutes, or 1-2 minutes after flow slows  o Flange Fit: Freely moving " nipple in the tunnel with some movement of the areola.  o Today's evaluation indicates appropriate flange   Increasing supply besides Galactogogues and Pumping: Warmth, Relaxation , Physical, auditory narratives, Childbirth relaxation Techniques, Acupuncture and acupressure, Shoulder Massages and Take a nap  •  Connect with other mothers:  o Facebook:   - [place] [facility]: https://www.[employer].com/[place].breastfeeds/  Well-Nourished Babies (Private group for questions and support): https://www.facebook.com/groups/027115642487802/     Parents expressed understanding, and asked appropriate questions    Summary parents have done an exceptional job at growing family for the last 12 days.  The experience of breast-feeding is daunting with the frequency the family wants to graze.  Reviewed that Martines  is exhausted as well  and created a plan to provide more sustainable approach to breast-feeding while increasing mom's milk supply    Follow-up for infant weight check and dyad breastfeeding evaluation in 10 day(s)  Please call [phone] if you have not scheduled your next appointment        I spent 60 min face to face excluding time with baby more than 50% of the time was spent on counseling and coordinating care as detailed in the above note.  An additional  30 min from 512pm to 542pm was spent in face to face prolonged care further counseling mom, planning lactation management, reviewing the feeding plan as detailed in the above note.  Start time: 4pm  Stop time:5:42pm       PLEASE NOTE: Some of this note was created using voice recognition software. I have made every reasonable attempt to correct obvious errors, but I expect that there may be errors of grammar and possibly content that I did not discover prior finalizing this note.  BEATA Reeder.

## 2020-03-25 ENCOUNTER — OFFICE VISIT (OUTPATIENT)
Dept: MEDICAL GROUP | Age: 35
End: 2020-03-25
Payer: COMMERCIAL

## 2020-03-25 DIAGNOSIS — F41.8 DEPRESSION WITH ANXIETY: ICD-10-CM

## 2020-03-25 DIAGNOSIS — Z00.00 PE (PHYSICAL EXAM), ANNUAL: ICD-10-CM

## 2020-03-25 DIAGNOSIS — F41.8 POSTPARTUM ANXIETY: ICD-10-CM

## 2020-03-25 DIAGNOSIS — Z23 NEED FOR VACCINATION: ICD-10-CM

## 2020-03-25 PROCEDURE — 99443 PR PHYSICIAN TELEPHONE EVALUATION 21-30 MIN: CPT | Mod: CR | Performed by: FAMILY MEDICINE

## 2020-03-25 RX ORDER — CITALOPRAM HYDROBROMIDE 10 MG/1
10 TABLET ORAL DAILY
Qty: 90 TAB | Refills: 1 | Status: SHIPPED | OUTPATIENT
Start: 2020-03-25 | End: 2020-06-26 | Stop reason: SDUPTHER

## 2020-03-25 RX ORDER — ALPRAZOLAM 0.25 MG/1
0.25 TABLET ORAL NIGHTLY PRN
Qty: 30 TAB | Refills: 0 | Status: SHIPPED
Start: 2020-03-25 | End: 2020-04-24

## 2020-03-25 NOTE — PROGRESS NOTES
Telephone Appointment Visit   As a means of avoiding spread of COVID-19, this visit is being conducted by telephone. This telephone visit was initiated by the patient and they verbally consented.     Time at start of call: 2:11 PM     Reason for Call:  Anxiety      Patient Comments / History:     History of chronic anxiety and depression currently treated with citalopram 10 mg qd. Patient recently gave birth three weeks early following induction on 3/8/20. She denied feeling anxious or depressed the first week afterward as she felt energized after birthing her first child; however, her symptoms have significantly worsened in the past week. She is currently breastfeeding and taking daily prenatal vitamins. Her OB/GYN cleared her for taking citalopram during the majority of her pregnancy as well as through postpartum. Patient is requesting a Rx for Xanax as the citalopram has not been enough in preventing her from experiencing episodes of increased panic. She has taken Xanax in the past which was effective at calming her. Some of her recent stress stems from a lack of support from family members who could not come secondary to COVID-19. Her  continues to work and has been helping whenever he can.      Healthcare maintenance reviewed. Patient did not receive the flu vaccine this year.     Labs / Images Reviewed   No pertinent labs or imaging studies available for review.     Assessment and Plan:   1. Depression with anxiety  2. Postpartum anxiety  - citalopram (CELEXA) 10 MG tablet; Take 1 Tab by mouth every day.  Dispense: 90 Tab; Refill: 1  - ALPRAZolam (XANAX) 0.25 MG Tab; Take 1 Tab by mouth at bedtime as needed for Anxiety for up to 30 days.  Dispense: 30 Tab; Refill: 0  - Appropriate counseling provided. Topics might include: regular exercise, well-balanced diet, multi-vitamin daily, weight loss, adequate sleep, meditation, stress management, avoidance of excessive consumption of caffeine, alcohol, illicit  drugs, tobacco.    - f/u in 3 months    3. Need for vaccination  - Influenza Vaccine Quad Injection (PF); Future    Follow-up: 3 months, annual PE     Time at end of call: 2:33 PM  Total Time Spent: 22 minutes

## 2020-04-21 ENCOUNTER — PATIENT MESSAGE (OUTPATIENT)
Dept: MEDICAL GROUP | Age: 35
End: 2020-04-21

## 2020-04-22 NOTE — TELEPHONE ENCOUNTER
From: Cinthia Alba  To: Jada Black M.D.  Sent: 4/21/2020 2:41 PM PDT  Subject: Prescription Question    Hi Dr. Black,    My OBGYN, Dr. Joe, has prescribed me birth control (Danae) to start taking since having my baby. I don't believe I've taken that kind before. A couple questions for you:    Was I taking a different birth control that you prescribed me prior to getting pregnant?  If so, should I take that birth control instead of Danae since I didn't have any problems with it before or is Danae a better one to take postpartum?    Just want to make sure I start taking whichever one will be best hormonally after having the baby.   Thank you!  Cinthia

## 2020-04-23 ENCOUNTER — PATIENT MESSAGE (OUTPATIENT)
Dept: MEDICAL GROUP | Age: 35
End: 2020-04-23

## 2020-04-23 NOTE — TELEPHONE ENCOUNTER
From: Cinthia Palak Alba  To: JEANNA East  Sent: 4/23/2020 6:44 AM PDT  Subject: Prescription Question    Is it ok to take this birth control while I’m trying to wean off breast feeding?      ----- Message -----   From:JEANNA East   Sent:4/22/2020 11:29 AM PDT   To:Cinthia Palak Alba   Subject:RE: Prescription Question    This is essentially the same just under a different brand name.     MARTHA Valdovinos  Family Medicine   Covering for Jada Black M.D.       ----- Message -----   From:Cinthia Palak Alba   Sent:4/21/2020 2:41 PM PDT   To:Jada Black M.D.   Subject:Prescription Question    Hi Dr. Black,    My OBGYN, Dr. Joe, has prescribed me birth control (Danae) to start taking since having my baby. I don't believe I've taken that kind before. A couple questions for you:    Was I taking a different birth control that you prescribed me prior to getting pregnant?  If so, should I take that birth control instead of Danae since I didn't have any problems with it before or is Danae a better one to take postpartum?    Just want to make sure I start taking whichever one will be best hormonally after having the baby.   Thank you!  Cinthia

## 2020-05-11 ENCOUNTER — HOSPITAL ENCOUNTER (OUTPATIENT)
Dept: LAB | Facility: MEDICAL CENTER | Age: 35
End: 2020-05-11
Attending: FAMILY MEDICINE
Payer: COMMERCIAL

## 2020-05-11 DIAGNOSIS — Z00.00 PE (PHYSICAL EXAM), ANNUAL: ICD-10-CM

## 2020-05-11 LAB
25(OH)D3 SERPL-MCNC: 32 NG/ML (ref 30–100)
ALBUMIN SERPL BCP-MCNC: 4.2 G/DL (ref 3.2–4.9)
ALBUMIN/GLOB SERPL: 1.4 G/DL
ALP SERPL-CCNC: 85 U/L (ref 30–99)
ALT SERPL-CCNC: 26 U/L (ref 2–50)
ANION GAP SERPL CALC-SCNC: 11 MMOL/L (ref 7–16)
AST SERPL-CCNC: 21 U/L (ref 12–45)
BASOPHILS # BLD AUTO: 0.6 % (ref 0–1.8)
BASOPHILS # BLD: 0.06 K/UL (ref 0–0.12)
BILIRUB SERPL-MCNC: 0.4 MG/DL (ref 0.1–1.5)
BUN SERPL-MCNC: 17 MG/DL (ref 8–22)
CALCIUM SERPL-MCNC: 9 MG/DL (ref 8.5–10.5)
CHLORIDE SERPL-SCNC: 106 MMOL/L (ref 96–112)
CHOLEST SERPL-MCNC: 206 MG/DL (ref 100–199)
CO2 SERPL-SCNC: 25 MMOL/L (ref 20–33)
CREAT SERPL-MCNC: 0.77 MG/DL (ref 0.5–1.4)
EOSINOPHIL # BLD AUTO: 0.09 K/UL (ref 0–0.51)
EOSINOPHIL NFR BLD: 0.8 % (ref 0–6.9)
ERYTHROCYTE [DISTWIDTH] IN BLOOD BY AUTOMATED COUNT: 39.8 FL (ref 35.9–50)
EST. AVERAGE GLUCOSE BLD GHB EST-MCNC: 105 MG/DL
FASTING STATUS PATIENT QL REPORTED: NORMAL
GLOBULIN SER CALC-MCNC: 3 G/DL (ref 1.9–3.5)
GLUCOSE SERPL-MCNC: 83 MG/DL (ref 65–99)
HBA1C MFR BLD: 5.3 % (ref 0–5.6)
HCT VFR BLD AUTO: 40.6 % (ref 37–47)
HDLC SERPL-MCNC: 51 MG/DL
HGB BLD-MCNC: 13.2 G/DL (ref 12–16)
IMM GRANULOCYTES # BLD AUTO: 0.04 K/UL (ref 0–0.11)
IMM GRANULOCYTES NFR BLD AUTO: 0.4 % (ref 0–0.9)
LDLC SERPL CALC-MCNC: 135 MG/DL
LYMPHOCYTES # BLD AUTO: 1.89 K/UL (ref 1–4.8)
LYMPHOCYTES NFR BLD: 17.4 % (ref 22–41)
MCH RBC QN AUTO: 31.2 PG (ref 27–33)
MCHC RBC AUTO-ENTMCNC: 32.5 G/DL (ref 33.6–35)
MCV RBC AUTO: 96 FL (ref 81.4–97.8)
MONOCYTES # BLD AUTO: 0.38 K/UL (ref 0–0.85)
MONOCYTES NFR BLD AUTO: 3.5 % (ref 0–13.4)
NEUTROPHILS # BLD AUTO: 8.42 K/UL (ref 2–7.15)
NEUTROPHILS NFR BLD: 77.3 % (ref 44–72)
NRBC # BLD AUTO: 0 K/UL
NRBC BLD-RTO: 0 /100 WBC
PLATELET # BLD AUTO: 239 K/UL (ref 164–446)
PMV BLD AUTO: 11.2 FL (ref 9–12.9)
POTASSIUM SERPL-SCNC: 4.5 MMOL/L (ref 3.6–5.5)
PROT SERPL-MCNC: 7.2 G/DL (ref 6–8.2)
RBC # BLD AUTO: 4.23 M/UL (ref 4.2–5.4)
SODIUM SERPL-SCNC: 142 MMOL/L (ref 135–145)
TRIGL SERPL-MCNC: 102 MG/DL (ref 0–149)
WBC # BLD AUTO: 10.9 K/UL (ref 4.8–10.8)

## 2020-05-11 PROCEDURE — 82306 VITAMIN D 25 HYDROXY: CPT

## 2020-05-11 PROCEDURE — 85025 COMPLETE CBC W/AUTO DIFF WBC: CPT

## 2020-05-11 PROCEDURE — 80061 LIPID PANEL: CPT

## 2020-05-11 PROCEDURE — 80053 COMPREHEN METABOLIC PANEL: CPT

## 2020-05-11 PROCEDURE — 36415 COLL VENOUS BLD VENIPUNCTURE: CPT

## 2020-05-11 PROCEDURE — 83036 HEMOGLOBIN GLYCOSYLATED A1C: CPT

## 2020-05-12 ENCOUNTER — TELEMEDICINE (OUTPATIENT)
Dept: MEDICAL GROUP | Age: 35
End: 2020-05-12
Payer: COMMERCIAL

## 2020-05-12 VITALS — HEIGHT: 63 IN | BODY MASS INDEX: 24.8 KG/M2 | WEIGHT: 140 LBS

## 2020-05-12 DIAGNOSIS — F41.9 ANXIETY: ICD-10-CM

## 2020-05-12 DIAGNOSIS — R10.2 PELVIC CRAMPING: ICD-10-CM

## 2020-05-12 DIAGNOSIS — N93.9 ABNORMAL VAGINAL BLEEDING: ICD-10-CM

## 2020-05-12 DIAGNOSIS — Z30.41 ORAL CONTRACEPTIVE USE: ICD-10-CM

## 2020-05-12 DIAGNOSIS — F43.9 STRESS: ICD-10-CM

## 2020-05-12 PROCEDURE — 99214 OFFICE O/P EST MOD 30 MIN: CPT | Mod: 95,CR | Performed by: INTERNAL MEDICINE

## 2020-05-12 ASSESSMENT — ANXIETY QUESTIONNAIRES
GAD7 TOTAL SCORE: 12
1. FEELING NERVOUS, ANXIOUS, OR ON EDGE: MORE THAN HALF THE DAYS
2. NOT BEING ABLE TO STOP OR CONTROL WORRYING: MORE THAN HALF THE DAYS
6. BECOMING EASILY ANNOYED OR IRRITABLE: SEVERAL DAYS
3. WORRYING TOO MUCH ABOUT DIFFERENT THINGS: MORE THAN HALF THE DAYS
5. BEING SO RESTLESS THAT IT IS HARD TO SIT STILL: MORE THAN HALF THE DAYS
7. FEELING AFRAID AS IF SOMETHING AWFUL MIGHT HAPPEN: SEVERAL DAYS
4. TROUBLE RELAXING: MORE THAN HALF THE DAYS

## 2020-05-12 ASSESSMENT — PATIENT HEALTH QUESTIONNAIRE - PHQ9: CLINICAL INTERPRETATION OF PHQ2 SCORE: 0

## 2020-05-12 ASSESSMENT — FIBROSIS 4 INDEX: FIB4 SCORE: 0.57

## 2020-05-12 NOTE — PROGRESS NOTES
Telemedicine Visit: Established Patient     This Remote Face to Face encounter was conducted via [employer]. Given the importance of social distancing and other strategies recommended to reduce the risk of COVID-19 transmission, I am providing medical care to this patient via audio/video visit in place of an in person visit at the request of the patient. Verbal consent to telehealth, risks, benefits, and consequences were discussed. Patient retains the right to withdraw at any time. All existing confidentiality protections apply. The patient has access to all transmitted medical information. No dissemination of any patient images or information to other entities without further written consent.    CHIEF COMPLAINT     Chief Complaint   Patient presents with   • Vaginal Bleeding     questions, was heavy bleeding stopped     HPI  Cinthia Alba is a 34 y.o. female who presents today for the following     H/o delivery on 3/8/20 (3 mos ago), abnormal heavy vaginal bleeding, cramping, OCP  Anxiety/stress  The patient is 34-year-old, female, with history of recent delivery 2 months ago, history of anxiety, stressed out due to continues vaginal bleeding since delivery, for the last 2 months.  Bleeding has been up and down, heavy in the last 3 to 4 days, with large clots with bleeding recently and accompanied with cramping.    She was evaluated by her GYN 6 weeks after delivery, started her on OCP, believed that she started with. Menstrual periods.  Afterwards, she developed headaches.    She has not have fever, chills, abdominal or flank pain.    Reviewed PMH, PSH, FH, SH, ALL, HCM/IMM, no changes  Reviewed MEDS, no changes    Patient Active Problem List    Diagnosis Date Noted   • Lactation suppression 03/20/2020   • Depression with anxiety 09/27/2018   • Pure hypercholesterolemia 09/11/2017   • Situational anxiety 02/09/2017   • Bruxism (teeth grinding) 02/09/2017   • Hx of abnormal cervical Pap smear 02/09/2017  "    CURRENT MEDICATIONS  Current Outpatient Medications   Medication Sig Dispense Refill   • citalopram (CELEXA) 10 MG tablet Take 1 Tab by mouth every day. 90 Tab 1   • Prenatal MV-Min-Fe Fum-FA-DHA (PRENATAL 1 PO) Take  by mouth.       No current facility-administered medications for this visit.      ALLERGIES  Allergies: Patient has no known allergies.  PAST MEDICAL HISTORY  No past medical history on file.  SURGICAL HISTORY  She  has a past surgical history that includes foot surgery (Bilateral).  SOCIAL HISTORY  Social History     Tobacco Use   • Smoking status: Never Smoker   • Smokeless tobacco: Never Used   Substance Use Topics   • Alcohol use: No   • Drug use: No     Social History     Social History Narrative   • Not on file     FAMILY HISTORY  Family History   Problem Relation Age of Onset   • Psychiatric Illness Mother    • Hypertension Father    • Cancer Father 60        melanoma   • No Known Problems Sister      Family Status   Relation Name Status   • Mo  Alive   • Fa  Alive   • Sis  Alive     ROS   Constitutional: Negative for fever, chills.  Eyes: Negative for vision problems.   Respiratory: Negative for cough, shortness of breath.  Cardiovascular: Negative for chest pain, palpitations.   Gastrointestinal: Negative for heartburn, nausea, abdominal pain.   Genitourinary: Negative for dysuria.  And per HPI.  Musculoskeletal: Negative for back pain.   Skin: Negative for rash.   Neuro: Negative for dizziness, weakness and per HPI.   Endo/Heme/Allergies: Does not bruise/bleed easily.   Psychiatric/Behavioral: Complains of anxiety.    Objective   Vitals obtained by patient:  Height 1.6 m (5' 3\")   Weight 63.5 kg (140 lb)   Body Mass Index 24.80 kg/m²   Physical Exam:  Constitutional: Alert, no distress, well-groomed.  Skin: No rash in visible areas.  Eye: Round. Conjunctiva clear, lids normal.  ENMT: Lips pink without lesions, good dentition. Phonation normal.  Neck: No visible masses or thyromegaly. " Moves freely without pain.  CV: no peripheral cyanosis, tachycardia.  Respiratory: Unlabored respiratory effort, no cough or audible wheezing.  Psych: Alert and oriented x3, normal affect and mood.     Labs     Labs are reviewed and discussed with a patient  Lab Results   Component Value Date/Time    CHOLSTRLTOT 206 (H) 05/11/2020 10:38 AM     (H) 05/11/2020 10:38 AM    HDL 51 05/11/2020 10:38 AM    TRIGLYCERIDE 102 05/11/2020 10:38 AM       Lab Results   Component Value Date/Time    SODIUM 142 05/11/2020 10:38 AM    POTASSIUM 4.5 05/11/2020 10:38 AM    CHLORIDE 106 05/11/2020 10:38 AM    CO2 25 05/11/2020 10:38 AM    GLUCOSE 83 05/11/2020 10:38 AM    BUN 17 05/11/2020 10:38 AM    CREATININE 0.77 05/11/2020 10:38 AM     Lab Results   Component Value Date/Time    ALKPHOSPHAT 85 05/11/2020 10:38 AM    ASTSGOT 21 05/11/2020 10:38 AM    ALTSGPT 26 05/11/2020 10:38 AM    TBILIRUBIN 0.4 05/11/2020 10:38 AM      Lab Results   Component Value Date/Time    HBA1C 5.3 05/11/2020 10:38 AM     No results found for: TSH  No results found for: FREET4    Lab Results   Component Value Date/Time    WBC 10.9 (H) 05/11/2020 10:38 AM    RBC 4.23 05/11/2020 10:38 AM    HEMOGLOBIN 13.2 05/11/2020 10:38 AM    HEMATOCRIT 40.6 05/11/2020 10:38 AM    MCV 96.0 05/11/2020 10:38 AM    MCH 31.2 05/11/2020 10:38 AM    MCHC 32.5 (L) 05/11/2020 10:38 AM    MPV 11.2 05/11/2020 10:38 AM    NEUTSPOLYS 77.30 (H) 05/11/2020 10:38 AM    LYMPHOCYTES 17.40 (L) 05/11/2020 10:38 AM    MONOCYTES 3.50 05/11/2020 10:38 AM    EOSINOPHILS 0.80 05/11/2020 10:38 AM    BASOPHILS 0.60 05/11/2020 10:38 AM      Imaging      None    Assessment and Plan     Cinthia Alba is a 34 y.o. female    1. Abnormal vaginal bleeding  2. Pelvic cramping  3. Oral contraceptive use  4. Anxiety  5. Stress    I reviewed the labs;  -  low hemoglobin during delivery normalized, so her anemia improved and she did not develop further hemoglobin drop.  Advised to discuss  problems with GYN.  - possible retained placenta?!    Follow-up: prn

## 2020-05-28 ENCOUNTER — HOSPITAL ENCOUNTER (OUTPATIENT)
Facility: MEDICAL CENTER | Age: 35
End: 2020-05-28
Attending: OBSTETRICS & GYNECOLOGY
Payer: COMMERCIAL

## 2020-05-28 LAB
ALBUMIN SERPL BCP-MCNC: 4 G/DL (ref 3.2–4.9)
ALBUMIN/GLOB SERPL: 1.5 G/DL
ALP SERPL-CCNC: 56 U/L (ref 30–99)
ALT SERPL-CCNC: 21 U/L (ref 2–50)
ANION GAP SERPL CALC-SCNC: 12 MMOL/L (ref 7–16)
AST SERPL-CCNC: 16 U/L (ref 12–45)
BILIRUB SERPL-MCNC: 0.5 MG/DL (ref 0.1–1.5)
BUN SERPL-MCNC: 18 MG/DL (ref 8–22)
CALCIUM SERPL-MCNC: 9.3 MG/DL (ref 8.5–10.5)
CHLORIDE SERPL-SCNC: 105 MMOL/L (ref 96–112)
CO2 SERPL-SCNC: 22 MMOL/L (ref 20–33)
CREAT SERPL-MCNC: 0.67 MG/DL (ref 0.5–1.4)
ERYTHROCYTE [DISTWIDTH] IN BLOOD BY AUTOMATED COUNT: 41.9 FL (ref 35.9–50)
GLOBULIN SER CALC-MCNC: 2.7 G/DL (ref 1.9–3.5)
GLUCOSE SERPL-MCNC: 91 MG/DL (ref 65–99)
HCT VFR BLD AUTO: 42.3 % (ref 37–47)
HGB BLD-MCNC: 13.5 G/DL (ref 12–16)
MCH RBC QN AUTO: 30.8 PG (ref 27–33)
MCHC RBC AUTO-ENTMCNC: 31.9 G/DL (ref 33.6–35)
MCV RBC AUTO: 96.4 FL (ref 81.4–97.8)
NT-PROBNP SERPL IA-MCNC: 60 PG/ML (ref 0–125)
PLATELET # BLD AUTO: 190 K/UL (ref 164–446)
PMV BLD AUTO: 12.1 FL (ref 9–12.9)
POTASSIUM SERPL-SCNC: 4.5 MMOL/L (ref 3.6–5.5)
PROT SERPL-MCNC: 6.7 G/DL (ref 6–8.2)
RBC # BLD AUTO: 4.39 M/UL (ref 4.2–5.4)
SODIUM SERPL-SCNC: 139 MMOL/L (ref 135–145)
T4 SERPL-MCNC: 7 UG/DL (ref 4–12)
TSH SERPL DL<=0.005 MIU/L-ACNC: 1.46 UIU/ML (ref 0.38–5.33)
WBC # BLD AUTO: 4.5 K/UL (ref 4.8–10.8)

## 2020-05-28 PROCEDURE — 85027 COMPLETE CBC AUTOMATED: CPT

## 2020-05-28 PROCEDURE — 83880 ASSAY OF NATRIURETIC PEPTIDE: CPT

## 2020-05-28 PROCEDURE — 84443 ASSAY THYROID STIM HORMONE: CPT

## 2020-05-28 PROCEDURE — 84436 ASSAY OF TOTAL THYROXINE: CPT

## 2020-05-28 PROCEDURE — 80053 COMPREHEN METABOLIC PANEL: CPT

## 2020-06-26 ENCOUNTER — TELEMEDICINE (OUTPATIENT)
Dept: MEDICAL GROUP | Age: 35
End: 2020-06-26
Payer: COMMERCIAL

## 2020-06-26 VITALS — WEIGHT: 137 LBS | HEIGHT: 63 IN | BODY MASS INDEX: 24.27 KG/M2

## 2020-06-26 DIAGNOSIS — N93.9 ABNORMAL VAGINAL BLEEDING: ICD-10-CM

## 2020-06-26 DIAGNOSIS — Z00.00 PE (PHYSICAL EXAM), ANNUAL: Primary | ICD-10-CM

## 2020-06-26 DIAGNOSIS — F41.8 DEPRESSION WITH ANXIETY: ICD-10-CM

## 2020-06-26 DIAGNOSIS — F45.8 BRUXISM (TEETH GRINDING): ICD-10-CM

## 2020-06-26 DIAGNOSIS — E78.00 PURE HYPERCHOLESTEROLEMIA: ICD-10-CM

## 2020-06-26 PROBLEM — Z30.41 ORAL CONTRACEPTIVE USE: Status: RESOLVED | Noted: 2017-02-09 | Resolved: 2020-06-26

## 2020-06-26 PROBLEM — O92.5 LACTATION SUPPRESSION: Status: RESOLVED | Noted: 2020-03-20 | Resolved: 2020-06-26

## 2020-06-26 PROCEDURE — 99395 PREV VISIT EST AGE 18-39: CPT | Mod: 95,CR | Performed by: FAMILY MEDICINE

## 2020-06-26 RX ORDER — CITALOPRAM HYDROBROMIDE 10 MG/1
10 TABLET ORAL DAILY
Qty: 90 TAB | Refills: 3 | Status: SHIPPED | OUTPATIENT
Start: 2020-09-01 | End: 2021-06-14

## 2020-06-26 ASSESSMENT — FIBROSIS 4 INDEX: FIB4 SCORE: 0.61

## 2020-06-28 PROBLEM — F41.8 SITUATIONAL ANXIETY: Status: RESOLVED | Noted: 2017-02-09 | Resolved: 2020-06-28

## 2020-06-28 NOTE — PROGRESS NOTES
Telemedicine Visit: Established Patient     This encounter was conducted via [employer].   Verbal consent was obtained. Patient's identity was verified.    Subjective:   CC: annual PE   Cinthia Alba is a 34 y.o. female presenting for evaluation and management of:    Pt is , sexually active, 4 months postpartum, no longer breast feeding. She has daily vaginal bleeding for 3 months. She was treated for endometritis with 14-day course of abx which resolved her symptoms. sonohysterogram was normal per pt's report. Most recent blood tests were negative for anemia.     She suffers depression/anxiety that is under good control with Celexa 10 mg qd. She also works with [employer] twice per week. She has mild HLD that does not require treatment. She is active and tries to exercise regularly. Neg hx of DM, HTN, drugs/alcohol/tobacco abuse.     She uses mouth guard to prevent Bruxism, which appears to help.       ROS   Denies any recent fevers or chills. No nausea or vomiting. No chest pains or shortness of breath.     No Known Allergies    Current medicines (including changes today)  Current Outpatient Medications   Medication Sig Dispense Refill   • [START ON 2020] citalopram (CELEXA) 10 MG tablet Take 1 Tab by mouth every day. 90 Tab 3   • Prenatal MV-Min-Fe Fum-FA-DHA (PRENATAL 1 PO) Take  by mouth.       No current facility-administered medications for this visit.        Patient Active Problem List    Diagnosis Date Noted   • Depression with anxiety 2018   • Pure hypercholesterolemia 2017   • Situational anxiety 2017   • Bruxism (teeth grinding) 2017   • Hx of abnormal cervical Pap smear 2017       Family History   Problem Relation Age of Onset   • Psychiatric Illness Mother    • Hypertension Father    • Cancer Father 60        melanoma   • No Known Problems Sister        She  has no past medical history on file.  She  has a past surgical history that includes foot surgery  "(Bilateral).       Objective:   Ht 1.6 m (5' 3\")   Wt 62.1 kg (137 lb) Comment: pt stated  BMI 24.27 kg/m²     Physical Exam:  Constitutional: Alert, no distress, well-groomed.  Skin: No rashes in visible areas.  Eye: Round. Conjunctiva clear, lids normal. No icterus.   ENMT: Lips pink without lesions, good dentition, moist mucous membranes. Phonation normal.  Neck: No masses, no thyromegaly. Moves freely without pain.  CV: Pulse as reported by patient  Respiratory: Unlabored respiratory effort, no cough or audible wheeze  Psych: Alert and oriented x3, normal affect and mood.       Assessment and Plan:   The following treatment plan was discussed:     1. Abnormal vaginal bleeding  , sexually active, 4 mo postpartum, had hx of daily vaginal bleeding which resolved with 14-day course of oral Abx for endometritis. Pt states that she had sonohysterography by OBGYN, which was normal.     2. Depression with anxiety  Chronic, controlled with Celexa 10 mg daily, no s/e reported, will cont.   - citalopram (CELEXA) 10 MG tablet; Take 1 Tab by mouth every day.  Dispense: 90 Tab; Refill: 3    3. Pure hypercholesterolemia  Chronic, low 10-year CVD risk, recommended dietary and lifestyle modification, brief dietary counseling provided, will continue to monitor.      4. Bruxism (teeth grinding)  Controlled with mouth guard, will cont to monitor.     5. PE (physical exam), annual  General counseling provided, topics might include: diet, exercise, vitamin supplement, mental health, sleep, stress management, pap, mammogram, colonoscopy and vaccine recommendations.           Follow-up: Return in about 1 year (around 2021).         "

## 2021-04-21 ENCOUNTER — IMMUNIZATION (OUTPATIENT)
Dept: FAMILY PLANNING/WOMEN'S HEALTH CLINIC | Facility: IMMUNIZATION CENTER | Age: 36
End: 2021-04-21
Payer: COMMERCIAL

## 2021-04-21 DIAGNOSIS — Z23 ENCOUNTER FOR VACCINATION: Primary | ICD-10-CM

## 2021-04-21 PROCEDURE — 91300 PFIZER SARS-COV-2 VACCINE: CPT | Performed by: INTERNAL MEDICINE

## 2021-04-21 PROCEDURE — 0001A PFIZER SARS-COV-2 VACCINE: CPT | Performed by: INTERNAL MEDICINE

## 2021-05-13 ENCOUNTER — IMMUNIZATION (OUTPATIENT)
Dept: FAMILY PLANNING/WOMEN'S HEALTH CLINIC | Facility: IMMUNIZATION CENTER | Age: 36
End: 2021-05-13
Attending: INTERNAL MEDICINE
Payer: COMMERCIAL

## 2021-05-13 DIAGNOSIS — Z23 ENCOUNTER FOR VACCINATION: Primary | ICD-10-CM

## 2021-05-13 PROCEDURE — 0002A PFIZER SARS-COV-2 VACCINE: CPT

## 2021-05-13 PROCEDURE — 91300 PFIZER SARS-COV-2 VACCINE: CPT

## 2021-06-07 ENCOUNTER — OFFICE VISIT (OUTPATIENT)
Dept: URGENT CARE | Facility: PHYSICIAN GROUP | Age: 36
End: 2021-06-07
Payer: COMMERCIAL

## 2021-06-07 ENCOUNTER — NURSE TRIAGE (OUTPATIENT)
Dept: HEALTH INFORMATION MANAGEMENT | Facility: OTHER | Age: 36
End: 2021-06-07

## 2021-06-07 ENCOUNTER — HOSPITAL ENCOUNTER (OUTPATIENT)
Facility: MEDICAL CENTER | Age: 36
End: 2021-06-07
Attending: FAMILY MEDICINE
Payer: COMMERCIAL

## 2021-06-07 ENCOUNTER — TELEPHONE (OUTPATIENT)
Dept: MEDICAL GROUP | Age: 36
End: 2021-06-07

## 2021-06-07 VITALS
WEIGHT: 137 LBS | SYSTOLIC BLOOD PRESSURE: 100 MMHG | DIASTOLIC BLOOD PRESSURE: 52 MMHG | OXYGEN SATURATION: 97 % | HEIGHT: 63 IN | HEART RATE: 71 BPM | TEMPERATURE: 98.4 F | RESPIRATION RATE: 16 BRPM | BODY MASS INDEX: 24.27 KG/M2

## 2021-06-07 DIAGNOSIS — R10.84 GENERALIZED ABDOMINAL PAIN: ICD-10-CM

## 2021-06-07 DIAGNOSIS — R00.2 PALPITATIONS: ICD-10-CM

## 2021-06-07 DIAGNOSIS — R11.0 NAUSEA: ICD-10-CM

## 2021-06-07 DIAGNOSIS — R19.7 DIARRHEA, UNSPECIFIED TYPE: ICD-10-CM

## 2021-06-07 LAB
ALBUMIN SERPL BCP-MCNC: 4.2 G/DL (ref 3.2–4.9)
ALBUMIN/GLOB SERPL: 1.4 G/DL
ALP SERPL-CCNC: 56 U/L (ref 30–99)
ALT SERPL-CCNC: 18 U/L (ref 2–50)
ANION GAP SERPL CALC-SCNC: 7 MMOL/L (ref 7–16)
APPEARANCE UR: CLEAR
AST SERPL-CCNC: 17 U/L (ref 12–45)
BASOPHILS # BLD AUTO: 0.8 % (ref 0–1.8)
BASOPHILS # BLD: 0.06 K/UL (ref 0–0.12)
BILIRUB SERPL-MCNC: 0.3 MG/DL (ref 0.1–1.5)
BILIRUB UR STRIP-MCNC: NEGATIVE MG/DL
BUN SERPL-MCNC: 22 MG/DL (ref 8–22)
CALCIUM SERPL-MCNC: 9.1 MG/DL (ref 8.5–10.5)
CHLORIDE SERPL-SCNC: 103 MMOL/L (ref 96–112)
CO2 SERPL-SCNC: 28 MMOL/L (ref 20–33)
COLOR UR AUTO: YELLOW
CREAT SERPL-MCNC: 0.8 MG/DL (ref 0.5–1.4)
EOSINOPHIL # BLD AUTO: 0.49 K/UL (ref 0–0.51)
EOSINOPHIL NFR BLD: 6.9 % (ref 0–6.9)
ERYTHROCYTE [DISTWIDTH] IN BLOOD BY AUTOMATED COUNT: 41.3 FL (ref 35.9–50)
GLOBULIN SER CALC-MCNC: 2.9 G/DL (ref 1.9–3.5)
GLUCOSE SERPL-MCNC: 90 MG/DL (ref 65–99)
GLUCOSE UR STRIP.AUTO-MCNC: NEGATIVE MG/DL
HCT VFR BLD AUTO: 42.3 % (ref 37–47)
HGB BLD-MCNC: 14.1 G/DL (ref 12–16)
IMM GRANULOCYTES # BLD AUTO: 0.01 K/UL (ref 0–0.11)
IMM GRANULOCYTES NFR BLD AUTO: 0.1 % (ref 0–0.9)
KETONES UR STRIP.AUTO-MCNC: NEGATIVE MG/DL
LEUKOCYTE ESTERASE UR QL STRIP.AUTO: NEGATIVE
LIPASE SERPL-CCNC: 24 U/L (ref 11–82)
LYMPHOCYTES # BLD AUTO: 2.75 K/UL (ref 1–4.8)
LYMPHOCYTES NFR BLD: 38.6 % (ref 22–41)
MCH RBC QN AUTO: 30.7 PG (ref 27–33)
MCHC RBC AUTO-ENTMCNC: 33.3 G/DL (ref 33.6–35)
MCV RBC AUTO: 92.2 FL (ref 81.4–97.8)
MONOCYTES # BLD AUTO: 0.38 K/UL (ref 0–0.85)
MONOCYTES NFR BLD AUTO: 5.3 % (ref 0–13.4)
NEUTROPHILS # BLD AUTO: 3.44 K/UL (ref 2–7.15)
NEUTROPHILS NFR BLD: 48.3 % (ref 44–72)
NITRITE UR QL STRIP.AUTO: NEGATIVE
NRBC # BLD AUTO: 0 K/UL
NRBC BLD-RTO: 0 /100 WBC
PH UR STRIP.AUTO: 7 [PH] (ref 5–8)
PLATELET # BLD AUTO: 171 K/UL (ref 164–446)
PMV BLD AUTO: 11.3 FL (ref 9–12.9)
POTASSIUM SERPL-SCNC: 3.9 MMOL/L (ref 3.6–5.5)
PROT SERPL-MCNC: 7.1 G/DL (ref 6–8.2)
PROT UR QL STRIP: NEGATIVE MG/DL
RBC # BLD AUTO: 4.59 M/UL (ref 4.2–5.4)
RBC UR QL AUTO: NEGATIVE
SODIUM SERPL-SCNC: 138 MMOL/L (ref 135–145)
SP GR UR STRIP.AUTO: 1.02
UROBILINOGEN UR STRIP-MCNC: 0.2 MG/DL
WBC # BLD AUTO: 7.1 K/UL (ref 4.8–10.8)

## 2021-06-07 PROCEDURE — 83690 ASSAY OF LIPASE: CPT

## 2021-06-07 PROCEDURE — 81002 URINALYSIS NONAUTO W/O SCOPE: CPT | Performed by: FAMILY MEDICINE

## 2021-06-07 PROCEDURE — 99214 OFFICE O/P EST MOD 30 MIN: CPT | Performed by: FAMILY MEDICINE

## 2021-06-07 PROCEDURE — 80053 COMPREHEN METABOLIC PANEL: CPT

## 2021-06-07 PROCEDURE — 85025 COMPLETE CBC W/AUTO DIFF WBC: CPT

## 2021-06-07 RX ORDER — ONDANSETRON 4 MG/1
4 TABLET, ORALLY DISINTEGRATING ORAL EVERY 8 HOURS PRN
Qty: 6 TABLET | Refills: 0 | Status: SHIPPED | OUTPATIENT
Start: 2021-06-07 | End: 2021-06-14

## 2021-06-07 ASSESSMENT — ENCOUNTER SYMPTOMS
EYE DISCHARGE: 0
MYALGIAS: 0
VOMITING: 0
NAUSEA: 0
WEIGHT LOSS: 0
EYE REDNESS: 0

## 2021-06-07 ASSESSMENT — FIBROSIS 4 INDEX: FIB4 SCORE: 0.62

## 2021-06-07 NOTE — TELEPHONE ENCOUNTER
----- Message from Marilin Anglin sent at 6/7/2021  4:11 PM PDT -----  Pt has been experiencing mid back pain, abdominal pain, nausea, vomiting and heart palpitations.  If at all possible she is wanting to schedule an appt with her pcp.

## 2021-06-07 NOTE — TELEPHONE ENCOUNTER
"Patient has had abdominal pain radiating to back and ribs for last 3 weeks, it varies in intensity from 6-10 out of 10 pain, with diarrhea and vomiting at times. Pain is mild now with no N/V. Advised to go to .     Reason for Disposition  • Patient sounds very sick or weak to the triager    Additional Information  • Negative: Passed out (i.e., fainted, collapsed and was not responding)  • Negative: Shock suspected (e.g., cold/pale/clammy skin, too weak to stand, low BP, rapid pulse)  • Negative: Sounds like a life-threatening emergency to the triager  • Negative: Chest pain  • Negative: Pain is mainly in upper abdomen (if needed ask: 'is it mainly above the belly button?')  • Negative: Abdominal pain and pregnant > 20 weeks  • Negative: Abdominal pain and pregnant < 20 weeks  • Negative: SEVERE abdominal pain (e.g., excruciating)  • Negative: Vomiting red blood or black (coffee ground) material  • Negative: Bloody, black, or tarry bowel movements  • Negative: Vomiting bile (green color)  • Negative: Constant abdominal pain lasting > 2 hours    Answer Assessment - Initial Assessment Questions  1. LOCATION: \"Where does it hurt?\"       abdominal pain entire belly and lower mid back  2. RADIATION: \"Does the pain shoot anywhere else?\" (e.g., chest, back)      Lower mid back to lower rib cage  3. ONSET: \"When did the pain begin?\" (e.g., minutes, hours or days ago)       3 weeks ago  4. SUDDEN: \"Gradual or sudden onset?\"      Suddenly  5. PATTERN \"Does the pain come and go, or is it constant?\"     - If constant: \"Is it getting better, staying the same, or worsening?\"       (Note: Constant means the pain never goes away completely; most serious pain is constant and it progresses)      - If intermittent: \"How long does it last?\" \"Do you have pain now?\"      (Note: Intermittent means the pain goes away completely between bouts)      Comes and goes more frequent and more severe  6. SEVERITY: \"How bad is the pain?\"  (e.g., " "Scale 1-10; mild, moderate, or severe)    - MILD (1-3): doesn't interfere with normal activities, abdomen soft and not tender to touch     - MODERATE (4-7): interferes with normal activities or awakens from sleep, tender to touch     - SEVERE (8-10): excruciating pain, doubled over, unable to do any normal activities       Currently constantly 6 out of 10 then goes to 9-10  7. RECURRENT SYMPTOM: \"Have you ever had this type of abdominal pain before?\" If so, ask: \"When was the last time?\" and \"What happened that time?\"       never  8. CAUSE: \"What do you think is causing the abdominal pain?\"      Unsure, though at first it was menstrual  9. RELIEVING/AGGRAVATING FACTORS: \"What makes it better or worse?\" (e.g., movement, antacids, bowel movement)      unsure  10. OTHER SYMPTOMS: \"Has there been any vomiting, diarrhea, constipation, or urine problems?\"        diarrhea  11. PREGNANCY: \"Is there any chance you are pregnant?\" \"When was your last menstrual period?\"        2 weeks ago LMP    Protocols used: ABDOMINAL PAIN - FEMALE-A-OH    "

## 2021-06-07 NOTE — TELEPHONE ENCOUNTER
Phone Number Called: nurse triage    Call outcome: spoke with nurse    Message: see nurse triage encounter. Nurse wanted to know if you would suggest ER or schedule next available appt with you.

## 2021-06-08 ENCOUNTER — HOSPITAL ENCOUNTER (OUTPATIENT)
Dept: RADIOLOGY | Facility: MEDICAL CENTER | Age: 36
End: 2021-06-08
Attending: FAMILY MEDICINE
Payer: COMMERCIAL

## 2021-06-08 DIAGNOSIS — R10.84 GENERALIZED ABDOMINAL PAIN: ICD-10-CM

## 2021-06-08 PROCEDURE — 700117 HCHG RX CONTRAST REV CODE 255: Performed by: FAMILY MEDICINE

## 2021-06-08 PROCEDURE — 74177 CT ABD & PELVIS W/CONTRAST: CPT

## 2021-06-08 RX ADMIN — IOHEXOL 100 ML: 350 INJECTION, SOLUTION INTRAVENOUS at 17:30

## 2021-06-08 RX ADMIN — IOHEXOL 25 ML: 240 INJECTION, SOLUTION INTRATHECAL; INTRAVASCULAR; INTRAVENOUS; ORAL at 17:30

## 2021-06-08 NOTE — PROGRESS NOTES
"Subjective:      Cinthia Alba is a 35 y.o. female who presents with Other (x2 weeks pain in middle of back and tightness in chest. Fatigue, and heart palpations )            2 weeks waxing and waning abdominal and mid-back pain. Has become more frequent and severe over the last few days. Watery diarrhea without blood in stool/multiple. N/V without blood in emesis. Tolerating some fluids with slightly decreased urine. No dysuria. No hematuria. Not pregnant. No recent travel/camping/abx use. No PMH/FH IBD. Intermittent heart palpitations/fluttering sensation. No chest pain. No other aggravating or alleviating factors.        Review of Systems   Constitutional: Negative for malaise/fatigue and weight loss.   Eyes: Negative for discharge and redness.   Gastrointestinal: Negative for nausea and vomiting.   Musculoskeletal: Negative for joint pain and myalgias.   Skin: Negative for itching and rash.            Objective:     /52   Pulse 71   Temp 36.9 °C (98.4 °F) (Temporal)   Resp 16   Ht 1.6 m (5' 3\")   Wt 62.1 kg (137 lb)   SpO2 97%   BMI 24.27 kg/m²      Physical Exam  Constitutional:       General: She is not in acute distress.     Appearance: She is well-developed.   HENT:      Head: Normocephalic and atraumatic.   Eyes:      Conjunctiva/sclera: Conjunctivae normal.   Cardiovascular:      Rate and Rhythm: Normal rate and regular rhythm.      Heart sounds: Normal heart sounds. No murmur heard.     Pulmonary:      Effort: Pulmonary effort is normal.      Breath sounds: Normal breath sounds. No wheezing.   Abdominal:      General: Bowel sounds are normal.      Palpations: Abdomen is soft.      Tenderness: There is abdominal tenderness (generalized). There is no guarding.   Skin:     General: Skin is warm and dry.      Findings: No rash.   Neurological:      Mental Status: She is alert and oriented to person, place, and time.                        Assessment/Plan:       EKG: NSR rate:66, normal " axis, normal intervals, no evidence of ischemia or hypertrophy.    CT abdomen/pelvis negative      1. Palpitations     2. Generalized abdominal pain  CBC WITH DIFFERENTIAL    Comp Metabolic Panel    LIPASE    POCT Urinalysis    CT-ABDOMEN-PELVIS WITH   3. Diarrhea, unspecified type  CULTURE STOOL   4. Nausea  ondansetron (ZOFRAN ODT) 4 MG TABLET DISPERSIBLE     Differential diagnosis, natural history, supportive care, and indications for immediate follow-up discussed at length.     Imodium prn, will f/u stool culture    Recommend f/u primary care.

## 2021-06-14 ENCOUNTER — OFFICE VISIT (OUTPATIENT)
Dept: MEDICAL GROUP | Age: 36
End: 2021-06-14
Payer: COMMERCIAL

## 2021-06-14 VITALS
TEMPERATURE: 98.4 F | SYSTOLIC BLOOD PRESSURE: 102 MMHG | BODY MASS INDEX: 23.32 KG/M2 | HEIGHT: 63 IN | WEIGHT: 131.6 LBS | DIASTOLIC BLOOD PRESSURE: 62 MMHG | HEART RATE: 84 BPM | OXYGEN SATURATION: 96 %

## 2021-06-14 DIAGNOSIS — R19.5 LOOSE STOOLS: ICD-10-CM

## 2021-06-14 DIAGNOSIS — Z80.8 FAMILY HISTORY OF MELANOMA: ICD-10-CM

## 2021-06-14 DIAGNOSIS — R11.0 NAUSEA: ICD-10-CM

## 2021-06-14 DIAGNOSIS — F41.8 DEPRESSION WITH ANXIETY: ICD-10-CM

## 2021-06-14 DIAGNOSIS — R10.84 ABDOMINAL PAIN, GENERALIZED: ICD-10-CM

## 2021-06-14 PROCEDURE — 99214 OFFICE O/P EST MOD 30 MIN: CPT | Performed by: PHYSICIAN ASSISTANT

## 2021-06-14 RX ORDER — ESCITALOPRAM OXALATE 20 MG/1
TABLET ORAL
Status: ON HOLD | COMMUNITY
Start: 2021-05-20 | End: 2022-10-31

## 2021-06-14 ASSESSMENT — FIBROSIS 4 INDEX: FIB4 SCORE: 0.8

## 2021-06-14 ASSESSMENT — PATIENT HEALTH QUESTIONNAIRE - PHQ9: CLINICAL INTERPRETATION OF PHQ2 SCORE: 0

## 2021-06-14 NOTE — PROGRESS NOTES
"  Subjective:     Chief Complaint   Patient presents with   • Follow-Up     Urgent Care; GI Problems      Cinthia Alba is a 35 y.o. female here today for evaluation and management of:    Abdominal pain/ loose stools/ N/V  Onset: 2 weeks ago.  Reports colicky-- tight pain that starts in back and wraps around to front.   Reports doubled over in pain.  Reports +diarrhea and nausea with occasional vomiting. Calls them \"episodes.\"  1 week prior to onset was following a keto diet (healthy) and working out.   Has young baby at home--sleeping fine. Reports on lexapro 20 mg (no recent dose changes) that helps with anxiety. Not breastfeeding.   No fever or chills with episodes.   No recent camping/near bodies of water. No sick contacts at home.  No blood or mucus in stool.    Reports some sores on tongue on occasion--new mouth retainer for teeth grinding, thought related to it.    Reports with keto was eating a lot of nuts, Smoothies, berries and spinach-- all of which were upsetting belly.    Went to UC 6/7/21 and evaluated, normal labs and CT of abdomen/pelvis. MD mentioned potential IBD. Over last few days has been following Crohn's specific diet and much improved. Reports loose stools persist but less. Reports 3-4 bowel movements a day and only one is a loose, watery stool.  Her normal bowel movement behavior prior to this was a BM a few times a day.      Skin lesion  Father with hx of melanoma.  She has two moles she would like assessed. Right upper thigh and mid back.   No change in size.  Lesion on leg, mom thought was ingrown hair so squeezed. Irritated but nothing came out.     Current medicines (including changes today)  Current Outpatient Medications   Medication Sig Dispense Refill   • escitalopram (LEXAPRO) 20 MG tablet      • Prenatal MV-Min-Fe Fum-FA-DHA (PRENATAL 1 PO) Take  by mouth.       No current facility-administered medications for this visit.        Objective:     Vitals:    06/14/21 1416 " "  BP: 102/62   BP Location: Right arm   Patient Position: Sitting   BP Cuff Size: Adult   Pulse: 84   Temp: 36.9 °C (98.4 °F)   TempSrc: Temporal   SpO2: 96%   Weight: 59.7 kg (131 lb 9.6 oz)   Height: 1.6 m (5' 3\")       Physical Exam:  Gen: Well developed, well nourished in no acute distress.   Body mass index is 23.31 kg/m².   Skin: Pink, warm, and dry. Round, flesh colored lesion on right upper leg. Multiple nevi present on mid back.   HEENT: conjunctiva non-injected, sclera non-icteric. EOMs intact.   Neck: Supple, trachea midline.   Lungs: Effort is normal. Clear to auscultation bilaterally with good excursion.  CV: regular rate and rhythm.  Abdomen: soft, diffusely tender. Some RUQ, epigastric and RLQ tenderness with moderate pressure. +murphys. +/- McBurney's. No rebound. Negative Rovsing. + BS. No HSM.  No CVAT  Ext: no edema, color normal, vascularity normal, temperature normal  Alert and oriented Eye contact is good, speech goal directed, affect calm    Results for orders placed or performed during the hospital encounter of 06/07/21   CBC WITH DIFFERENTIAL   Result Value Ref Range    WBC 7.1 4.8 - 10.8 K/uL    RBC 4.59 4.20 - 5.40 M/uL    Hemoglobin 14.1 12.0 - 16.0 g/dL    Hematocrit 42.3 37.0 - 47.0 %    MCV 92.2 81.4 - 97.8 fL    MCH 30.7 27.0 - 33.0 pg    MCHC 33.3 (L) 33.6 - 35.0 g/dL    RDW 41.3 35.9 - 50.0 fL    Platelet Count 171 164 - 446 K/uL    MPV 11.3 9.0 - 12.9 fL    Neutrophils-Polys 48.30 44.00 - 72.00 %    Lymphocytes 38.60 22.00 - 41.00 %    Monocytes 5.30 0.00 - 13.40 %    Eosinophils 6.90 0.00 - 6.90 %    Basophils 0.80 0.00 - 1.80 %    Immature Granulocytes 0.10 0.00 - 0.90 %    Nucleated RBC 0.00 /100 WBC    Neutrophils (Absolute) 3.44 2.00 - 7.15 K/uL    Lymphs (Absolute) 2.75 1.00 - 4.80 K/uL    Monos (Absolute) 0.38 0.00 - 0.85 K/uL    Eos (Absolute) 0.49 0.00 - 0.51 K/uL    Baso (Absolute) 0.06 0.00 - 0.12 K/uL    Immature Granulocytes (abs) 0.01 0.00 - 0.11 K/uL    NRBC " (Absolute) 0.00 K/uL   Comp Metabolic Panel   Result Value Ref Range    Sodium 138 135 - 145 mmol/L    Potassium 3.9 3.6 - 5.5 mmol/L    Chloride 103 96 - 112 mmol/L    Co2 28 20 - 33 mmol/L    Anion Gap 7.0 7.0 - 16.0    Glucose 90 65 - 99 mg/dL    Bun 22 8 - 22 mg/dL    Creatinine 0.80 0.50 - 1.40 mg/dL    Calcium 9.1 8.5 - 10.5 mg/dL    AST(SGOT) 17 12 - 45 U/L    ALT(SGPT) 18 2 - 50 U/L    Alkaline Phosphatase 56 30 - 99 U/L    Total Bilirubin 0.3 0.1 - 1.5 mg/dL    Albumin 4.2 3.2 - 4.9 g/dL    Total Protein 7.1 6.0 - 8.2 g/dL    Globulin 2.9 1.9 - 3.5 g/dL    A-G Ratio 1.4 g/dL   LIPASE   Result Value Ref Range    Lipase 24 11 - 82 U/L   ESTIMATED GFR   Result Value Ref Range    GFR If African American >60 >60 mL/min/1.73 m 2    GFR If Non African American >60 >60 mL/min/1.73 m 2     CT-ABDOMEN-PELVIS WITH  Result Date: 6/8/2021  FINDINGS: CT Abdomen: There is no evidence of focal consolidation or pleural effusion seen within the lung bases. 1.5 cm low-attenuation lesion is noted at the posterior edge right lobe of the liver. The spleen is normal. The kidneys are normal. The adrenal glands are normal. The pancreas is normal. The aorta is normal in caliber.  No evidence of retroperitoneal adenopathy. CT Pelvis: There is no evidence of bowel obstruction or inflammatory change. Normal appendix is not definitively identified. There is no evidence of free fluid.     No acute abnormalities are noted on abdominal CT. No acute abnormalities are identified on pelvic CT. Small right lobe liver lesion is identified which likely represents a small cyst or hemangioma. Ultrasound could be obtained for further evaluation if clinically indicated.     Reviewed and discussed above labs with patient in visit.       Assessment and Plan:   The following treatment plan was discussed:    1. Abdominal pain, generalized  2. Loose stools  Unclear etiology. Reviewed UC note from 6/7/21. Reviewed labs from 6/7/21-unremarkable and CT from  6/8/21--unremarkable. All normal. ED precautions given to include but not limited to: worsening abdominal pain, fever, chills, nausea, vomiting or diarrhea.   Continue with dietary modifications as they seem to be helping then advance diet as tolerated. In the event, there is no improvement, I will submit referral to GI. She may cancel if no longer needed.  - REFERRAL TO GASTROENTEROLOGY    3. Nausea  Resolved.     4. Family history of melanoma  Father with history of melanoma. She has never had full skin check. Referred to derm.  - REFERRAL TO DERMATOLOGY    5. Depression with anxiety  Chronic, stable. Continue current medicines. Monitor symptoms.  - escitalopram (LEXAPRO) 20 MG tablet    Followup: No follow-ups on file.

## 2021-09-24 ENCOUNTER — OFFICE VISIT (OUTPATIENT)
Dept: DERMATOLOGY | Facility: IMAGING CENTER | Age: 36
End: 2021-09-24
Payer: COMMERCIAL

## 2021-09-24 VITALS — WEIGHT: 128 LBS | HEIGHT: 63 IN | BODY MASS INDEX: 22.68 KG/M2 | TEMPERATURE: 98.5 F

## 2021-09-24 DIAGNOSIS — L90.8 SKIN AGING: ICD-10-CM

## 2021-09-24 DIAGNOSIS — L81.4 LENTIGO: ICD-10-CM

## 2021-09-24 DIAGNOSIS — D18.01 CHERRY ANGIOMA: ICD-10-CM

## 2021-09-24 DIAGNOSIS — Z12.83 SKIN CANCER SCREENING: ICD-10-CM

## 2021-09-24 DIAGNOSIS — D23.9 DERMATOFIBROMA: ICD-10-CM

## 2021-09-24 DIAGNOSIS — D22.9 NEVUS: ICD-10-CM

## 2021-09-24 PROCEDURE — 99203 OFFICE O/P NEW LOW 30 MIN: CPT | Performed by: DERMATOLOGY

## 2021-09-24 ASSESSMENT — FIBROSIS 4 INDEX: FIB4 SCORE: 0.82

## 2021-09-24 NOTE — PROGRESS NOTES
CC: TORSTEN    Subjective: New patient here for TORSTEN.    HPI/location: Right thigh  Time present: Summer 2021  Painful lesion: No  Itching lesion: Yes  Enlarging lesion: Yes  Anything make it better or worse? No    HPI/location: Left cheek  Time present: ~5 years, 2016  Painful lesion: No  Itching lesion: No  Enlarging lesion: No  Anything make it better or worse? No    History of skin cancer: No  History of precancers/actinic keratoses: No  History of biopsies:Yes, Details: benign  History of blistering/severe sunburns:No  Family history of skin cancer:Yes, Details: father- melanoma  Family history of atypical moles:No      ROS: no fevers/chills. No itch.  No cough  Relevant PMH: NC  Social:NS    PE: Gen:WDWN female in NAD. Skin: Scalp/face/eyes/lips/neck/chest/back/arms/legs/hands/feet/buttocks - without suspicious lesions noted.  Genitals exam declined  -hyperpigmented papule left cheek, benign in appearance  -red macules on skin  -scattered hyperpigmented macules/papules, appearing benign on torso and extremities  -dome shaped papule right lat thigh, benign in appearance    A/P: DF/Nevi: benign appearing:  -Reviewed ABCDEs  -reviewed derm net [place] info for DF  -Reviewed skin cancer detection/prevention  -RTC PRN growth/changes/concerning features    Lentigos: benign  -reassurance  -reviewed skin cancer detection/prevention  -counseled on cosmetic treatment PRN    Cherry angioma: benign  -counseled on cosmetic treatment PRN    Answered cosmetics questions about sunscreen/anti-aging and rec mian consult PRN    F/u 1year/PRN  I have reviewed medications relevant to my specialty.

## 2021-11-12 ENCOUNTER — HOSPITAL ENCOUNTER (OUTPATIENT)
Facility: MEDICAL CENTER | Age: 36
End: 2021-11-12
Attending: OBSTETRICS & GYNECOLOGY
Payer: COMMERCIAL

## 2021-11-12 ENCOUNTER — HOSPITAL ENCOUNTER (OUTPATIENT)
Dept: RADIOLOGY | Facility: MEDICAL CENTER | Age: 36
End: 2021-11-12
Attending: CHIROPRACTOR
Payer: COMMERCIAL

## 2021-11-12 DIAGNOSIS — M54.50 LOW BACK PAIN, UNSPECIFIED BACK PAIN LATERALITY, UNSPECIFIED CHRONICITY, UNSPECIFIED WHETHER SCIATICA PRESENT: ICD-10-CM

## 2021-11-12 PROCEDURE — 86780 TREPONEMA PALLIDUM: CPT

## 2021-11-12 PROCEDURE — 86803 HEPATITIS C AB TEST: CPT

## 2021-11-12 PROCEDURE — 86706 HEP B SURFACE ANTIBODY: CPT

## 2021-11-12 PROCEDURE — 87491 CHLMYD TRACH DNA AMP PROBE: CPT

## 2021-11-12 PROCEDURE — 72170 X-RAY EXAM OF PELVIS: CPT

## 2021-11-12 PROCEDURE — 87340 HEPATITIS B SURFACE AG IA: CPT

## 2021-11-12 PROCEDURE — 87389 HIV-1 AG W/HIV-1&-2 AB AG IA: CPT

## 2021-11-12 PROCEDURE — 84146 ASSAY OF PROLACTIN: CPT

## 2021-11-12 PROCEDURE — 84443 ASSAY THYROID STIM HORMONE: CPT

## 2021-11-12 PROCEDURE — 82306 VITAMIN D 25 HYDROXY: CPT

## 2021-11-12 PROCEDURE — 87591 N.GONORRHOEAE DNA AMP PROB: CPT

## 2021-11-13 LAB
C TRACH DNA SPEC QL NAA+PROBE: NEGATIVE
HBV SURFACE AB SERPL IA-ACNC: <3.5 MIU/ML (ref 0–10)
HBV SURFACE AG SER QL: NORMAL
HCV AB SER QL: NORMAL
HIV 1+2 AB+HIV1 P24 AG SERPL QL IA: NORMAL
N GONORRHOEA DNA SPEC QL NAA+PROBE: NEGATIVE
PROLACTIN SERPL-MCNC: 8.93 NG/ML (ref 2.8–26)
SPECIMEN SOURCE: NORMAL
TREPONEMA PALLIDUM IGG+IGM AB [PRESENCE] IN SERUM OR PLASMA BY IMMUNOASSAY: NORMAL
TSH SERPL DL<=0.005 MIU/L-ACNC: 1.63 UIU/ML (ref 0.38–5.33)

## 2021-11-16 LAB — 25(OH)D3 SERPL-MCNC: 35 NG/ML (ref 30–80)

## 2022-04-27 ENCOUNTER — HOSPITAL ENCOUNTER (OUTPATIENT)
Dept: LAB | Facility: MEDICAL CENTER | Age: 37
End: 2022-04-27
Attending: OBSTETRICS & GYNECOLOGY
Payer: COMMERCIAL

## 2022-04-27 PROCEDURE — 87591 N.GONORRHOEAE DNA AMP PROB: CPT

## 2022-04-27 PROCEDURE — 88175 CYTOPATH C/V AUTO FLUID REDO: CPT

## 2022-04-27 PROCEDURE — 87491 CHLMYD TRACH DNA AMP PROBE: CPT

## 2022-04-28 LAB
C TRACH DNA GENITAL QL NAA+PROBE: NEGATIVE
CYTOLOGY REG CYTOL: NORMAL
N GONORRHOEA DNA GENITAL QL NAA+PROBE: NEGATIVE
SPECIMEN SOURCE: NORMAL

## 2022-05-25 ENCOUNTER — HOSPITAL ENCOUNTER (OUTPATIENT)
Facility: MEDICAL CENTER | Age: 37
End: 2022-05-25
Attending: OBSTETRICS & GYNECOLOGY
Payer: COMMERCIAL

## 2022-05-25 LAB
ABO GROUP BLD: NORMAL
BASOPHILS # BLD AUTO: 0.5 % (ref 0–1.8)
BASOPHILS # BLD: 0.04 K/UL (ref 0–0.12)
BLD GP AB SCN SERPL QL: NORMAL
EOSINOPHIL # BLD AUTO: 0.06 K/UL (ref 0–0.51)
EOSINOPHIL NFR BLD: 0.7 % (ref 0–6.9)
ERYTHROCYTE [DISTWIDTH] IN BLOOD BY AUTOMATED COUNT: 47.6 FL (ref 35.9–50)
HCT VFR BLD AUTO: 39.7 % (ref 37–47)
HGB BLD-MCNC: 13.1 G/DL (ref 12–16)
IMM GRANULOCYTES # BLD AUTO: 0.03 K/UL (ref 0–0.11)
IMM GRANULOCYTES NFR BLD AUTO: 0.4 % (ref 0–0.9)
LYMPHOCYTES # BLD AUTO: 1.46 K/UL (ref 1–4.8)
LYMPHOCYTES NFR BLD: 17.4 % (ref 22–41)
MCH RBC QN AUTO: 32.2 PG (ref 27–33)
MCHC RBC AUTO-ENTMCNC: 33 G/DL (ref 33.6–35)
MCV RBC AUTO: 97.5 FL (ref 81.4–97.8)
MONOCYTES # BLD AUTO: 0.35 K/UL (ref 0–0.85)
MONOCYTES NFR BLD AUTO: 4.2 % (ref 0–13.4)
NEUTROPHILS # BLD AUTO: 6.45 K/UL (ref 2–7.15)
NEUTROPHILS NFR BLD: 76.8 % (ref 44–72)
NRBC # BLD AUTO: 0 K/UL
NRBC BLD-RTO: 0 /100 WBC
PLATELET # BLD AUTO: 227 K/UL (ref 164–446)
PMV BLD AUTO: 11.1 FL (ref 9–12.9)
RBC # BLD AUTO: 4.07 M/UL (ref 4.2–5.4)
RH BLD: NORMAL
WBC # BLD AUTO: 8.4 K/UL (ref 4.8–10.8)

## 2022-05-25 PROCEDURE — 86780 TREPONEMA PALLIDUM: CPT

## 2022-05-25 PROCEDURE — 80053 COMPREHEN METABOLIC PANEL: CPT

## 2022-05-25 PROCEDURE — 36415 COLL VENOUS BLD VENIPUNCTURE: CPT

## 2022-05-25 PROCEDURE — 86900 BLOOD TYPING SEROLOGIC ABO: CPT

## 2022-05-25 PROCEDURE — 86901 BLOOD TYPING SEROLOGIC RH(D): CPT

## 2022-05-25 PROCEDURE — 87086 URINE CULTURE/COLONY COUNT: CPT

## 2022-05-25 PROCEDURE — 86850 RBC ANTIBODY SCREEN: CPT

## 2022-05-25 PROCEDURE — 82105 ALPHA-FETOPROTEIN SERUM: CPT

## 2022-05-25 PROCEDURE — 86762 RUBELLA ANTIBODY: CPT

## 2022-05-25 PROCEDURE — 82570 ASSAY OF URINE CREATININE: CPT

## 2022-05-25 PROCEDURE — 86803 HEPATITIS C AB TEST: CPT

## 2022-05-25 PROCEDURE — 84156 ASSAY OF PROTEIN URINE: CPT

## 2022-05-25 PROCEDURE — 85025 COMPLETE CBC W/AUTO DIFF WBC: CPT

## 2022-05-25 PROCEDURE — 87340 HEPATITIS B SURFACE AG IA: CPT

## 2022-05-25 PROCEDURE — 87389 HIV-1 AG W/HIV-1&-2 AB AG IA: CPT

## 2022-05-26 LAB
ALBUMIN SERPL BCP-MCNC: 3.8 G/DL (ref 3.2–4.9)
ALBUMIN/GLOB SERPL: 1.3 G/DL
ALP SERPL-CCNC: 65 U/L (ref 30–99)
ALT SERPL-CCNC: 15 U/L (ref 2–50)
ANION GAP SERPL CALC-SCNC: 12 MMOL/L (ref 7–16)
AST SERPL-CCNC: 20 U/L (ref 12–45)
BILIRUB SERPL-MCNC: 0.2 MG/DL (ref 0.1–1.5)
BUN SERPL-MCNC: 13 MG/DL (ref 8–22)
CALCIUM SERPL-MCNC: 9 MG/DL (ref 8.5–10.5)
CHLORIDE SERPL-SCNC: 105 MMOL/L (ref 96–112)
CO2 SERPL-SCNC: 22 MMOL/L (ref 20–33)
CREAT SERPL-MCNC: 0.57 MG/DL (ref 0.5–1.4)
CREAT UR-MCNC: 62.2 MG/DL
GFR SERPLBLD CREATININE-BSD FMLA CKD-EPI: 121 ML/MIN/1.73 M 2
GLOBULIN SER CALC-MCNC: 2.9 G/DL (ref 1.9–3.5)
GLUCOSE SERPL-MCNC: 121 MG/DL (ref 65–99)
HBV SURFACE AG SER QL: NORMAL
HCV AB SER QL: NORMAL
HIV 1+2 AB+HIV1 P24 AG SERPL QL IA: NORMAL
POTASSIUM SERPL-SCNC: 4.4 MMOL/L (ref 3.6–5.5)
PROT SERPL-MCNC: 6.7 G/DL (ref 6–8.2)
PROT UR-MCNC: 5 MG/DL (ref 0–15)
PROT/CREAT UR: 80 MG/G (ref 10–107)
RUBV AB SER QL: 18.8 IU/ML
SODIUM SERPL-SCNC: 139 MMOL/L (ref 135–145)
T PALLIDUM AB SER QL IA: NORMAL

## 2022-05-27 LAB
BACTERIA UR CULT: NORMAL
SIGNIFICANT IND 70042: NORMAL
SITE SITE: NORMAL
SOURCE SOURCE: NORMAL

## 2022-05-28 LAB
# FETUSES US: NORMAL
AFP MOM SERPL: 1.35
AFP SERPL-MCNC: 65 NG/ML
AGE - REPORTED: 36.3 YR
CURRENT SMOKER: NO
FAMILY MEMBER DISEASES HX: NO
GA METHOD: NORMAL
GA: NORMAL WK
IDDM PATIENT QL: NO
INTEGRATED SCN PATIENT-IMP: NORMAL
SPECIMEN DRAWN SERPL: NORMAL

## 2022-07-19 ENCOUNTER — HOSPITAL ENCOUNTER (OUTPATIENT)
Dept: LAB | Facility: MEDICAL CENTER | Age: 37
End: 2022-07-19
Attending: OBSTETRICS & GYNECOLOGY
Payer: COMMERCIAL

## 2022-07-19 LAB
GLUCOSE 1H P 50 G GLC PO SERPL-MCNC: 116 MG/DL (ref 70–139)
HCT VFR BLD AUTO: 35.9 % (ref 37–47)
HGB BLD-MCNC: 12 G/DL (ref 12–16)
PLATELET # BLD AUTO: 211 K/UL (ref 164–446)
T PALLIDUM AB SER QL IA: NORMAL

## 2022-07-19 PROCEDURE — 85014 HEMATOCRIT: CPT

## 2022-07-19 PROCEDURE — 36415 COLL VENOUS BLD VENIPUNCTURE: CPT

## 2022-07-19 PROCEDURE — 86780 TREPONEMA PALLIDUM: CPT

## 2022-07-19 PROCEDURE — 82950 GLUCOSE TEST: CPT

## 2022-07-19 PROCEDURE — 85049 AUTOMATED PLATELET COUNT: CPT

## 2022-07-19 PROCEDURE — 85018 HEMOGLOBIN: CPT

## 2022-09-15 ENCOUNTER — HOSPITAL ENCOUNTER (OUTPATIENT)
Dept: LAB | Facility: MEDICAL CENTER | Age: 37
End: 2022-09-15
Attending: OBSTETRICS & GYNECOLOGY
Payer: COMMERCIAL

## 2022-09-15 PROCEDURE — 87081 CULTURE SCREEN ONLY: CPT

## 2022-09-15 PROCEDURE — 87150 DNA/RNA AMPLIFIED PROBE: CPT

## 2022-09-17 LAB — GP B STREP DNA SPEC QL NAA+PROBE: NEGATIVE

## 2022-10-28 ENCOUNTER — HOSPITAL ENCOUNTER (INPATIENT)
Facility: MEDICAL CENTER | Age: 37
LOS: 3 days | End: 2022-10-31
Attending: OBSTETRICS & GYNECOLOGY | Admitting: OBSTETRICS & GYNECOLOGY
Payer: COMMERCIAL

## 2022-10-28 ENCOUNTER — APPOINTMENT (OUTPATIENT)
Dept: OBGYN | Facility: MEDICAL CENTER | Age: 37
End: 2022-10-28
Attending: OBSTETRICS & GYNECOLOGY
Payer: COMMERCIAL

## 2022-10-28 DIAGNOSIS — K59.03 DRUG-INDUCED CONSTIPATION: ICD-10-CM

## 2022-10-28 DIAGNOSIS — D62 ANEMIA ASSOCIATED WITH ACUTE BLOOD LOSS: ICD-10-CM

## 2022-10-28 LAB
BASOPHILS # BLD AUTO: 0.5 % (ref 0–1.8)
BASOPHILS # BLD: 0.04 K/UL (ref 0–0.12)
EOSINOPHIL # BLD AUTO: 0.07 K/UL (ref 0–0.51)
EOSINOPHIL NFR BLD: 0.9 % (ref 0–6.9)
ERYTHROCYTE [DISTWIDTH] IN BLOOD BY AUTOMATED COUNT: 46.5 FL (ref 35.9–50)
HCT VFR BLD AUTO: 39.3 % (ref 37–47)
HGB BLD-MCNC: 13.1 G/DL (ref 12–16)
HOLDING TUBE BB 8507: NORMAL
IMM GRANULOCYTES # BLD AUTO: 0.03 K/UL (ref 0–0.11)
IMM GRANULOCYTES NFR BLD AUTO: 0.4 % (ref 0–0.9)
LYMPHOCYTES # BLD AUTO: 1.37 K/UL (ref 1–4.8)
LYMPHOCYTES NFR BLD: 17.1 % (ref 22–41)
MCH RBC QN AUTO: 31.9 PG (ref 27–33)
MCHC RBC AUTO-ENTMCNC: 33.3 G/DL (ref 33.6–35)
MCV RBC AUTO: 95.6 FL (ref 81.4–97.8)
MONOCYTES # BLD AUTO: 0.54 K/UL (ref 0–0.85)
MONOCYTES NFR BLD AUTO: 6.8 % (ref 0–13.4)
NEUTROPHILS # BLD AUTO: 5.95 K/UL (ref 2–7.15)
NEUTROPHILS NFR BLD: 74.3 % (ref 44–72)
NRBC # BLD AUTO: 0 K/UL
NRBC BLD-RTO: 0 /100 WBC
PLATELET # BLD AUTO: 175 K/UL (ref 164–446)
PMV BLD AUTO: 11.2 FL (ref 9–12.9)
RBC # BLD AUTO: 4.11 M/UL (ref 4.2–5.4)
T PALLIDUM AB SER QL IA: NORMAL
WBC # BLD AUTO: 8 K/UL (ref 4.8–10.8)

## 2022-10-28 PROCEDURE — 36415 COLL VENOUS BLD VENIPUNCTURE: CPT

## 2022-10-28 PROCEDURE — 3E0P7VZ INTRODUCTION OF HORMONE INTO FEMALE REPRODUCTIVE, VIA NATURAL OR ARTIFICIAL OPENING: ICD-10-PCS | Performed by: OBSTETRICS & GYNECOLOGY

## 2022-10-28 PROCEDURE — 86780 TREPONEMA PALLIDUM: CPT

## 2022-10-28 PROCEDURE — 770002 HCHG ROOM/CARE - OB PRIVATE (112)

## 2022-10-28 PROCEDURE — A9270 NON-COVERED ITEM OR SERVICE: HCPCS | Performed by: OBSTETRICS & GYNECOLOGY

## 2022-10-28 PROCEDURE — 85025 COMPLETE CBC W/AUTO DIFF WBC: CPT

## 2022-10-28 PROCEDURE — 700102 HCHG RX REV CODE 250 W/ 637 OVERRIDE(OP): Performed by: OBSTETRICS & GYNECOLOGY

## 2022-10-28 RX ORDER — ACETAMINOPHEN 500 MG
1000 TABLET ORAL
Status: DISCONTINUED | OUTPATIENT
Start: 2022-10-28 | End: 2022-10-29 | Stop reason: HOSPADM

## 2022-10-28 RX ORDER — MISOPROSTOL 200 UG/1
800 TABLET ORAL
Status: COMPLETED | OUTPATIENT
Start: 2022-10-28 | End: 2022-10-29

## 2022-10-28 RX ORDER — ONDANSETRON 4 MG/1
4 TABLET, ORALLY DISINTEGRATING ORAL EVERY 6 HOURS PRN
Status: DISCONTINUED | OUTPATIENT
Start: 2022-10-28 | End: 2022-10-29 | Stop reason: HOSPADM

## 2022-10-28 RX ORDER — OXYTOCIN 10 [USP'U]/ML
10 INJECTION, SOLUTION INTRAMUSCULAR; INTRAVENOUS
Status: COMPLETED | OUTPATIENT
Start: 2022-10-28 | End: 2022-10-29

## 2022-10-28 RX ORDER — TERBUTALINE SULFATE 1 MG/ML
0.25 INJECTION, SOLUTION SUBCUTANEOUS
Status: DISCONTINUED | OUTPATIENT
Start: 2022-10-28 | End: 2022-10-29 | Stop reason: HOSPADM

## 2022-10-28 RX ORDER — DEXTROSE, SODIUM CHLORIDE, SODIUM LACTATE, POTASSIUM CHLORIDE, AND CALCIUM CHLORIDE 5; .6; .31; .03; .02 G/100ML; G/100ML; G/100ML; G/100ML; G/100ML
INJECTION, SOLUTION INTRAVENOUS CONTINUOUS
Status: DISCONTINUED | OUTPATIENT
Start: 2022-10-28 | End: 2022-10-31 | Stop reason: HOSPADM

## 2022-10-28 RX ORDER — LIDOCAINE HYDROCHLORIDE 10 MG/ML
20 INJECTION, SOLUTION INFILTRATION; PERINEURAL
Status: COMPLETED | OUTPATIENT
Start: 2022-10-28 | End: 2022-10-29

## 2022-10-28 RX ORDER — IBUPROFEN 800 MG/1
800 TABLET ORAL
Status: COMPLETED | OUTPATIENT
Start: 2022-10-28 | End: 2022-10-29

## 2022-10-28 RX ORDER — SODIUM CHLORIDE, SODIUM LACTATE, POTASSIUM CHLORIDE, CALCIUM CHLORIDE 600; 310; 30; 20 MG/100ML; MG/100ML; MG/100ML; MG/100ML
1000 INJECTION, SOLUTION INTRAVENOUS CONTINUOUS
Status: ACTIVE | OUTPATIENT
Start: 2022-10-28 | End: 2022-10-28

## 2022-10-28 RX ORDER — ONDANSETRON 2 MG/ML
4 INJECTION INTRAMUSCULAR; INTRAVENOUS EVERY 6 HOURS PRN
Status: DISCONTINUED | OUTPATIENT
Start: 2022-10-28 | End: 2022-10-29 | Stop reason: HOSPADM

## 2022-10-28 RX ADMIN — MISOPROSTOL 25 MCG: 100 TABLET ORAL at 17:32

## 2022-10-28 RX ADMIN — MISOPROSTOL 25 MCG: 100 TABLET ORAL at 13:03

## 2022-10-28 ASSESSMENT — LIFESTYLE VARIABLES
HAVE YOU EVER FELT YOU SHOULD CUT DOWN ON YOUR DRINKING: NO
TOTAL SCORE: 0
AVERAGE NUMBER OF DAYS PER WEEK YOU HAVE A DRINK CONTAINING ALCOHOL: 0
EVER HAD A DRINK FIRST THING IN THE MORNING TO STEADY YOUR NERVES TO GET RID OF A HANGOVER: NO
HOW MANY TIMES IN THE PAST YEAR HAVE YOU HAD 5 OR MORE DRINKS IN A DAY: 0
ON A TYPICAL DAY WHEN YOU DRINK ALCOHOL HOW MANY DRINKS DO YOU HAVE: 0
HAVE PEOPLE ANNOYED YOU BY CRITICIZING YOUR DRINKING: NO
EVER FELT BAD OR GUILTY ABOUT YOUR DRINKING: NO
TOTAL SCORE: 0
CONSUMPTION TOTAL: NEGATIVE
TOTAL SCORE: 0
EVER_SMOKED: NEVER
ALCOHOL_USE: NO

## 2022-10-28 ASSESSMENT — COPD QUESTIONNAIRES
DO YOU EVER COUGH UP ANY MUCUS OR PHLEGM?: NO/ONLY WITH OCCASIONAL COLDS OR INFECTIONS
DURING THE PAST 4 WEEKS HOW MUCH DID YOU FEEL SHORT OF BREATH: NONE/LITTLE OF THE TIME
IN THE PAST 12 MONTHS DO YOU DO LESS THAN YOU USED TO BECAUSE OF YOUR BREATHING PROBLEMS: DISAGREE/UNSURE
HAVE YOU SMOKED AT LEAST 100 CIGARETTES IN YOUR ENTIRE LIFE: NO/DON'T KNOW

## 2022-10-28 ASSESSMENT — PAIN SCALES - GENERAL: PAINLEVEL: 0 - NO PAIN

## 2022-10-28 ASSESSMENT — FIBROSIS 4 INDEX: FIB4 SCORE: 0.88

## 2022-10-28 NOTE — PROGRESS NOTES
JASS: 10/22/22 40.6    1143: Pt presents to L&D for scheduled IOL. Admission profile completed. PIV placed, labs drawn. Assessment done. POC discussed with pt and family. All questions answered.     1233: SVE by RN. Fingertip/thick/high.     1303: Cytotec placed (See MAR).     1405: Dr. Eid notified. Update given to MD. Orders clarified.     1732: Dr. Eid at bedside. SVE by MD. /2. Cytotec placed by MD (See MAR).     : Report given to Vandana RAGSDALE RN. POC discussed with pt and family.

## 2022-10-29 ENCOUNTER — ANESTHESIA (OUTPATIENT)
Dept: ANESTHESIOLOGY | Facility: MEDICAL CENTER | Age: 37
End: 2022-10-29
Payer: COMMERCIAL

## 2022-10-29 ENCOUNTER — ANESTHESIA EVENT (OUTPATIENT)
Dept: ANESTHESIOLOGY | Facility: MEDICAL CENTER | Age: 37
End: 2022-10-29
Payer: COMMERCIAL

## 2022-10-29 LAB
BASOPHILS # BLD AUTO: 0.3 % (ref 0–1.8)
BASOPHILS # BLD: 0.04 K/UL (ref 0–0.12)
EOSINOPHIL # BLD AUTO: 0.01 K/UL (ref 0–0.51)
EOSINOPHIL NFR BLD: 0.1 % (ref 0–6.9)
ERYTHROCYTE [DISTWIDTH] IN BLOOD BY AUTOMATED COUNT: 47.5 FL (ref 35.9–50)
ERYTHROCYTE [DISTWIDTH] IN BLOOD BY AUTOMATED COUNT: 47.8 FL (ref 35.9–50)
HCT VFR BLD AUTO: 24.4 % (ref 37–47)
HCT VFR BLD AUTO: 35.1 % (ref 37–47)
HGB BLD-MCNC: 11.9 G/DL (ref 12–16)
HGB BLD-MCNC: 8.2 G/DL (ref 12–16)
IMM GRANULOCYTES # BLD AUTO: 0.08 K/UL (ref 0–0.11)
IMM GRANULOCYTES NFR BLD AUTO: 0.6 % (ref 0–0.9)
LYMPHOCYTES # BLD AUTO: 1.07 K/UL (ref 1–4.8)
LYMPHOCYTES NFR BLD: 8.6 % (ref 22–41)
MCH RBC QN AUTO: 33.2 PG (ref 27–33)
MCH RBC QN AUTO: 33.2 PG (ref 27–33)
MCHC RBC AUTO-ENTMCNC: 33.6 G/DL (ref 33.6–35)
MCHC RBC AUTO-ENTMCNC: 33.9 G/DL (ref 33.6–35)
MCV RBC AUTO: 98 FL (ref 81.4–97.8)
MCV RBC AUTO: 98.8 FL (ref 81.4–97.8)
MONOCYTES # BLD AUTO: 0.56 K/UL (ref 0–0.85)
MONOCYTES NFR BLD AUTO: 4.5 % (ref 0–13.4)
NEUTROPHILS # BLD AUTO: 10.71 K/UL (ref 2–7.15)
NEUTROPHILS NFR BLD: 85.9 % (ref 44–72)
NRBC # BLD AUTO: 0 K/UL
NRBC BLD-RTO: 0 /100 WBC
PLATELET # BLD AUTO: 120 K/UL (ref 164–446)
PLATELET # BLD AUTO: 136 K/UL (ref 164–446)
PMV BLD AUTO: 11.1 FL (ref 9–12.9)
PMV BLD AUTO: 11.3 FL (ref 9–12.9)
RBC # BLD AUTO: 2.47 M/UL (ref 4.2–5.4)
RBC # BLD AUTO: 3.58 M/UL (ref 4.2–5.4)
WBC # BLD AUTO: 12.5 K/UL (ref 4.8–10.8)
WBC # BLD AUTO: 13.6 K/UL (ref 4.8–10.8)

## 2022-10-29 PROCEDURE — 700102 HCHG RX REV CODE 250 W/ 637 OVERRIDE(OP): Performed by: OBSTETRICS & GYNECOLOGY

## 2022-10-29 PROCEDURE — 700111 HCHG RX REV CODE 636 W/ 250 OVERRIDE (IP): Performed by: ANESTHESIOLOGY

## 2022-10-29 PROCEDURE — 0KQM0ZZ REPAIR PERINEUM MUSCLE, OPEN APPROACH: ICD-10-PCS | Performed by: OBSTETRICS & GYNECOLOGY

## 2022-10-29 PROCEDURE — 770002 HCHG ROOM/CARE - OB PRIVATE (112)

## 2022-10-29 PROCEDURE — 700101 HCHG RX REV CODE 250: Performed by: ANESTHESIOLOGY

## 2022-10-29 PROCEDURE — A9270 NON-COVERED ITEM OR SERVICE: HCPCS | Performed by: OBSTETRICS & GYNECOLOGY

## 2022-10-29 PROCEDURE — 85027 COMPLETE CBC AUTOMATED: CPT

## 2022-10-29 PROCEDURE — 59409 OBSTETRICAL CARE: CPT

## 2022-10-29 PROCEDURE — 36415 COLL VENOUS BLD VENIPUNCTURE: CPT

## 2022-10-29 PROCEDURE — 700111 HCHG RX REV CODE 636 W/ 250 OVERRIDE (IP)

## 2022-10-29 PROCEDURE — 303615 HCHG EPIDURAL/SPINAL ANESTHESIA FOR LABOR

## 2022-10-29 PROCEDURE — 700111 HCHG RX REV CODE 636 W/ 250 OVERRIDE (IP): Performed by: OBSTETRICS & GYNECOLOGY

## 2022-10-29 PROCEDURE — 700101 HCHG RX REV CODE 250: Performed by: OBSTETRICS & GYNECOLOGY

## 2022-10-29 PROCEDURE — 304965 HCHG RECOVERY SERVICES

## 2022-10-29 PROCEDURE — 01967 NEURAXL LBR ANES VAG DLVR: CPT | Performed by: ANESTHESIOLOGY

## 2022-10-29 PROCEDURE — 85025 COMPLETE CBC W/AUTO DIFF WBC: CPT

## 2022-10-29 RX ORDER — FERROUS SULFATE 325(65) MG
325 TABLET ORAL 2 TIMES DAILY WITH MEALS
Status: DISCONTINUED | OUTPATIENT
Start: 2022-10-29 | End: 2022-10-31 | Stop reason: HOSPADM

## 2022-10-29 RX ORDER — ROPIVACAINE HYDROCHLORIDE 2 MG/ML
INJECTION, SOLUTION EPIDURAL; INFILTRATION; PERINEURAL
Status: COMPLETED
Start: 2022-10-29 | End: 2022-10-29

## 2022-10-29 RX ORDER — PRAMOXINE HYDROCHLORIDE 10 MG/G
AEROSOL, FOAM TOPICAL 4 TIMES DAILY PRN
Status: CANCELLED | OUTPATIENT
Start: 2022-10-29

## 2022-10-29 RX ORDER — ROPIVACAINE HYDROCHLORIDE 2 MG/ML
INJECTION, SOLUTION EPIDURAL; INFILTRATION; PERINEURAL CONTINUOUS
Status: DISCONTINUED | OUTPATIENT
Start: 2022-10-29 | End: 2022-10-31 | Stop reason: HOSPADM

## 2022-10-29 RX ORDER — CARBOPROST TROMETHAMINE 250 UG/ML
250 INJECTION, SOLUTION INTRAMUSCULAR
Status: DISCONTINUED | OUTPATIENT
Start: 2022-10-29 | End: 2022-10-31 | Stop reason: HOSPADM

## 2022-10-29 RX ORDER — METHYLERGONOVINE MALEATE 0.2 MG/ML
INJECTION INTRAVENOUS
Status: COMPLETED
Start: 2022-10-29 | End: 2022-10-29

## 2022-10-29 RX ORDER — SODIUM CHLORIDE, SODIUM LACTATE, POTASSIUM CHLORIDE, AND CALCIUM CHLORIDE .6; .31; .03; .02 G/100ML; G/100ML; G/100ML; G/100ML
250 INJECTION, SOLUTION INTRAVENOUS PRN
Status: DISCONTINUED | OUTPATIENT
Start: 2022-10-29 | End: 2022-10-29 | Stop reason: HOSPADM

## 2022-10-29 RX ORDER — BISACODYL 10 MG
10 SUPPOSITORY, RECTAL RECTAL PRN
Status: DISCONTINUED | OUTPATIENT
Start: 2022-10-29 | End: 2022-10-31 | Stop reason: HOSPADM

## 2022-10-29 RX ORDER — BUPIVACAINE HYDROCHLORIDE 2.5 MG/ML
INJECTION, SOLUTION EPIDURAL; INFILTRATION; INTRACAUDAL
Status: COMPLETED
Start: 2022-10-29 | End: 2022-10-29

## 2022-10-29 RX ORDER — BUPIVACAINE HYDROCHLORIDE 2.5 MG/ML
INJECTION, SOLUTION EPIDURAL; INFILTRATION; INTRACAUDAL PRN
Status: DISCONTINUED | OUTPATIENT
Start: 2022-10-29 | End: 2022-10-29 | Stop reason: SURG

## 2022-10-29 RX ORDER — SODIUM CHLORIDE, SODIUM LACTATE, POTASSIUM CHLORIDE, CALCIUM CHLORIDE 600; 310; 30; 20 MG/100ML; MG/100ML; MG/100ML; MG/100ML
INJECTION, SOLUTION INTRAVENOUS PRN
Status: DISCONTINUED | OUTPATIENT
Start: 2022-10-29 | End: 2022-10-31 | Stop reason: HOSPADM

## 2022-10-29 RX ORDER — ACETAMINOPHEN 500 MG
1000 TABLET ORAL EVERY 6 HOURS PRN
Status: DISCONTINUED | OUTPATIENT
Start: 2022-10-29 | End: 2022-10-31 | Stop reason: HOSPADM

## 2022-10-29 RX ORDER — LIDOCAINE HYDROCHLORIDE AND EPINEPHRINE 15; 5 MG/ML; UG/ML
INJECTION, SOLUTION EPIDURAL
Status: COMPLETED | OUTPATIENT
Start: 2022-10-29 | End: 2022-10-29

## 2022-10-29 RX ORDER — OXYCODONE HYDROCHLORIDE 5 MG/1
5 TABLET ORAL EVERY 4 HOURS PRN
Status: DISCONTINUED | OUTPATIENT
Start: 2022-10-29 | End: 2022-10-31 | Stop reason: HOSPADM

## 2022-10-29 RX ORDER — IBUPROFEN 800 MG/1
800 TABLET ORAL EVERY 8 HOURS PRN
Status: DISCONTINUED | OUTPATIENT
Start: 2022-10-29 | End: 2022-10-31 | Stop reason: HOSPADM

## 2022-10-29 RX ORDER — SODIUM CHLORIDE, SODIUM LACTATE, POTASSIUM CHLORIDE, AND CALCIUM CHLORIDE .6; .31; .03; .02 G/100ML; G/100ML; G/100ML; G/100ML
1000 INJECTION, SOLUTION INTRAVENOUS
Status: DISCONTINUED | OUTPATIENT
Start: 2022-10-29 | End: 2022-10-29 | Stop reason: HOSPADM

## 2022-10-29 RX ORDER — METHYLERGONOVINE MALEATE 0.2 MG/ML
0.2 INJECTION INTRAVENOUS
Status: DISCONTINUED | OUTPATIENT
Start: 2022-10-29 | End: 2022-10-31 | Stop reason: HOSPADM

## 2022-10-29 RX ORDER — LIDOCAINE HYDROCHLORIDE 10 MG/ML
INJECTION, SOLUTION EPIDURAL; INFILTRATION; INTRACAUDAL; PERINEURAL
Status: ACTIVE
Start: 2022-10-29 | End: 2022-10-29

## 2022-10-29 RX ORDER — MISOPROSTOL 200 UG/1
800 TABLET ORAL
Status: DISCONTINUED | OUTPATIENT
Start: 2022-10-29 | End: 2022-10-31 | Stop reason: HOSPADM

## 2022-10-29 RX ORDER — METHYLERGONOVINE MALEATE 0.2 MG/ML
0.2 INJECTION INTRAVENOUS ONCE
Status: COMPLETED | OUTPATIENT
Start: 2022-10-29 | End: 2022-10-29

## 2022-10-29 RX ORDER — VITAMIN A ACETATE, BETA CAROTENE, ASCORBIC ACID, CHOLECALCIFEROL, .ALPHA.-TOCOPHEROL ACETATE, DL-, THIAMINE MONONITRATE, RIBOFLAVIN, NIACINAMIDE, PYRIDOXINE HYDROCHLORIDE, FOLIC ACID, CYANOCOBALAMIN, CALCIUM CARBONATE, FERROUS FUMARATE, ZINC OXIDE, CUPRIC OXIDE 3080; 12; 120; 400; 1; 1.84; 3; 20; 22; 920; 25; 200; 27; 10; 2 [IU]/1; UG/1; MG/1; [IU]/1; MG/1; MG/1; MG/1; MG/1; MG/1; [IU]/1; MG/1; MG/1; MG/1; MG/1; MG/1
1 TABLET, FILM COATED ORAL
Status: DISCONTINUED | OUTPATIENT
Start: 2022-10-29 | End: 2022-10-31 | Stop reason: HOSPADM

## 2022-10-29 RX ORDER — DOCUSATE SODIUM 100 MG/1
100 CAPSULE, LIQUID FILLED ORAL 2 TIMES DAILY PRN
Status: DISCONTINUED | OUTPATIENT
Start: 2022-10-29 | End: 2022-10-31 | Stop reason: HOSPADM

## 2022-10-29 RX ADMIN — BUPIVACAINE HYDROCHLORIDE 8 ML: 2.5 INJECTION, SOLUTION EPIDURAL; INFILTRATION; INTRACAUDAL; PERINEURAL at 04:24

## 2022-10-29 RX ADMIN — OXYTOCIN 10 UNITS: 10 INJECTION, SOLUTION INTRAMUSCULAR; INTRAVENOUS at 08:03

## 2022-10-29 RX ADMIN — ROPIVACAINE HYDROCHLORIDE: 2 INJECTION, SOLUTION EPIDURAL; INFILTRATION; PERINEURAL at 02:00

## 2022-10-29 RX ADMIN — ROPIVACAINE HYDROCHLORIDE: 5 INJECTION, SOLUTION EPIDURAL; INFILTRATION; PERINEURAL at 02:00

## 2022-10-29 RX ADMIN — FENTANYL CITRATE 100 MCG: 50 INJECTION, SOLUTION INTRAMUSCULAR; INTRAVENOUS at 04:24

## 2022-10-29 RX ADMIN — ONDANSETRON 4 MG: 2 INJECTION INTRAMUSCULAR; INTRAVENOUS at 00:33

## 2022-10-29 RX ADMIN — LIDOCAINE HYDROCHLORIDE 20 ML: 10 INJECTION, SOLUTION EPIDURAL; INFILTRATION; INTRACAUDAL; PERINEURAL at 08:15

## 2022-10-29 RX ADMIN — BUPIVACAINE HYDROCHLORIDE 8 ML: 2.5 INJECTION, SOLUTION EPIDURAL; INFILTRATION; INTRACAUDAL; PERINEURAL at 01:12

## 2022-10-29 RX ADMIN — PRENATAL WITH FERROUS FUM AND FOLIC ACID 1 TABLET: 3080; 920; 120; 400; 22; 1.84; 3; 20; 10; 1; 12; 200; 27; 25; 2 TABLET ORAL at 13:12

## 2022-10-29 RX ADMIN — MISOPROSTOL 800 MCG: 200 TABLET ORAL at 07:52

## 2022-10-29 RX ADMIN — LIDOCAINE HYDROCHLORIDE,EPINEPHRINE BITARTRATE 3 ML: 15; .005 INJECTION, SOLUTION EPIDURAL; INFILTRATION; INTRACAUDAL; PERINEURAL at 01:08

## 2022-10-29 RX ADMIN — METHYLERGONOVINE MALEATE 0.2 MG: 0.2 INJECTION, SOLUTION INTRAMUSCULAR; INTRAVENOUS at 07:54

## 2022-10-29 RX ADMIN — FERROUS SULFATE TAB 325 MG (65 MG ELEMENTAL FE) 325 MG: 325 (65 FE) TAB at 22:14

## 2022-10-29 RX ADMIN — DOCUSATE SODIUM 100 MG: 100 CAPSULE, LIQUID FILLED ORAL at 20:03

## 2022-10-29 RX ADMIN — FENTANYL CITRATE 100 MCG: 50 INJECTION, SOLUTION INTRAMUSCULAR; INTRAVENOUS at 01:12

## 2022-10-29 RX ADMIN — IBUPROFEN 800 MG: 800 TABLET, FILM COATED ORAL at 18:30

## 2022-10-29 RX ADMIN — IBUPROFEN 800 MG: 800 TABLET, FILM COATED ORAL at 10:00

## 2022-10-29 RX ADMIN — METHYLERGONOVINE MALEATE 0.2 MG: 0.2 INJECTION INTRAVENOUS at 07:54

## 2022-10-29 ASSESSMENT — PAIN DESCRIPTION - PAIN TYPE
TYPE: ACUTE PAIN
TYPE: ACUTE PAIN

## 2022-10-29 ASSESSMENT — PAIN SCALES - GENERAL: PAIN_LEVEL: 0

## 2022-10-29 NOTE — PROGRESS NOTES
Pt was not completely pain free from epidural and there fore refused Pitocin to be started. She was just given a bolus. Pt will agree to cervical exam and possible IUPC placement once she feels her pain is controlled enough.  FHTs 130s Cat 1. [place] - not tracing

## 2022-10-29 NOTE — PROGRESS NOTES
S:  Pt requested and received epidural. Agrees to pitocin    O: VSS - afebrile  FHTs: 130s Cat1   [place]: Q 4  Cx: 4/100/-1    A/P:  IUP at 41 weeks, IOL for post dates - doing well  Labor: Now in active phase. Start Pitocin. Recheck in 2 hours and place IUPC if needed  FWB: reassuring

## 2022-10-29 NOTE — PROGRESS NOTES
Late entry due to patient care    1900 - Report received from Catherine CHADWICK. POC discussed, assumed care of patient at this time.     0330 - Patient reports unrelieved pain.  to bedside.     0500 - Report given to Heather LEDEZMA discussed, relinquished care of patient at this time.

## 2022-10-29 NOTE — PROGRESS NOTES
Pt comfortable and agrees to cervical exam    C/C/+2  FHTs 140s with 3 decels when pt was on back - resolved with position change    Begin second stage

## 2022-10-29 NOTE — ANESTHESIA PREPROCEDURE EVALUATION
Date: 10/29/22  Procedure: Labor Epidural         Relevant Problems   NEURO   (positive) Hx of abnormal cervical Pap smear       Physical Exam    Airway   Mallampati: II  TM distance: >3 FB  Neck ROM: full       Cardiovascular - normal exam  Rhythm: regular  Rate: normal  (-) murmur     Dental - normal exam           Pulmonary - normal exam  Breath sounds clear to auscultation     Abdominal    Neurological - normal exam                 Anesthesia Plan    ASA 2       Plan - epidural   Neuraxial block will be labor analgesia                  Pertinent diagnostic labs and testing reviewed    Informed Consent:    Anesthetic plan and risks discussed with patient.

## 2022-10-29 NOTE — PROGRESS NOTES
S: Pt sleeping and not reporting many contractions    O: VSS - afebrile  FHTs  130s Cat 1  [place] occasional  Cx: 1/50/-2    A/P:  IUP at 40 6/7 weeks, IOL for post dates - doing well  Labor:  Second dose of misoprostol placed  FWB: reassuring

## 2022-10-29 NOTE — ANESTHESIA PROCEDURE NOTES
Epidural Block    Date/Time: 10/29/2022 1:08 AM  Performed by: Johnny Pedersen M.D.  Authorized by: Johnny Pedersen M.D.     Patient Location:  OB  Start Time:  10/29/2022 1:08 AM  Reason for Block: labor analgesia    patient identified, IV checked, site marked, risks and benefits discussed, surgical consent, monitors and equipment checked, pre-op evaluation and timeout performed    Patient Position:  Sitting  Prep: ChloraPrep, patient draped and sterile technique    Monitoring:  Blood pressure, continuous pulse oximetry and heart rate  Approach:  Midline  Location:  L3-L4  Injection Technique:  YESSICA air and YESSICA saline  Skin infiltration:  Lidocaine  Strength:  1%  Dose:  3ml  Needle Type:  Tuohy  Needle Gauge:  17 G  Needle Length:  3.5 in  Loss of resistance::  5  Catheter Size:  19 G  Catheter at Skin Depth:  9  Test Dose Result:  Negative

## 2022-10-29 NOTE — PROGRESS NOTES
Patient transferred from labor and delivery to room 341. Patient and family oriented to room and postpartum routine. Assessment done. Patient has no c/o pain. Lochia light  Fundus firm. IV site without redness, swelling or drainage.2nd bag pitocin placed on pump at 125/hr.emergency light reviewed.

## 2022-10-29 NOTE — PROGRESS NOTES
RN checked pt and cx was now 1-2/50/-2.  Rec starting Pitocin.  Pt declines at this time and will advise when she is prepared to have pitocin started.

## 2022-10-29 NOTE — PROGRESS NOTES
0700-Report received from Heather CHADWICK. Patient C/+1/pushing. Mother of patient, FOB, and  at pt's side.     0745- Delivery of viable male infant with 1/4/8 Apgars. Infant brought to warmer and dried. Verbal orders received for methergine, cytotec, lidocaine  and IM pitocin from Dr. Eid after delivery. Infant then placed on maternal chest. Baby latched x2.      0930- Mother up to the bathroom x2, positive void. Bleeding light/scant.    1115- Pt moved to PP unit, with her , sister, mother at her side. Report given to Es CHADWICK.

## 2022-10-29 NOTE — L&D DELIVERY NOTE
DATE OF SERVICE:  10/29/2022     DELIVERY NOTE      This is a 37-year-old  2, para 1 with an estimated date of confinement   of 10/22/2022 established by frozen embryo transfer, who presented to labor   and delivery on 10/28/2022 for postdates induction of labor.  She received 2   doses of misoprostol and underwent spontaneous rupture of membranes for clear   fluid.  Throughout labor, fetal heart tones were in the 130s and a category 1   tracing.  She requested an epidural for pain management.  She declined Pitocin   augmentation and intrauterine pressure catheter until her pain was well   controlled with the epidural. At that point, she was checked and her cervix   was completely dilated at 100% effaced, +3 station.  This is on the morning of   10/29/2022.  The patient was instructed to start the second stage of labor   and pushing.  She was very ineffective in this because her epidural was too   dense.  She was pushing every third contraction. With pushes, there was a   fetal heart rate deceleration to the 90s, but quick return to baseline.    Eventually, she was able to push more effectively and at the advice of her    she was on hands and knees.  It was very difficult to trace the fetal   heart rate because of the different pushing positions her  was   recommending.  Ultimately, the head was delivered occiput anterior in the   hands and knees position. Thick meconium was also noted.  I did instruct the patient to return to dorsal   lithotomy position and at this time I was able to note that there was two  tight nuchal cords, which were reduced.  The rest of the baby delivered   easily.  This is a viable male infant, weight 7 pounds 6 ounces, Apgars 1, 4 and 8,   delivered at 0745 hours.  The cord was cut immediately and the infant was   handed off to the waiting respiratory therapist intensive care nursery staff   and nursing staff.  No meconium was noted below the vocal cords. The placenta  delivered spontaneously intact with a   3-vessel cord at 0752.  There was a delay in getting Pitocin started so   ultimately 20 units of IV Pitocin, 10 units of IM Pitocin, 800 mcg of Cytotec   per rectum and 1 amp of Methergine IM along with fundal massage and bimanual   massage were required to achieve good uterine contraction.  Estimated blood   loss was 1500 mL consistent with a postpartum hemorrhage.  Inspection of the   cervix revealed no lacerations.  Inspection of the perineum revealed a   second-degree laceration.  She had good sphincter tone noted with placement of   Cytotec.  The area was infiltrated with 15 mL of 1% lidocaine without   epinephrine because she still claimed that she could feel pain, the repair was   performed with 0 and 2-0 Vicryl in the usual sterile fashion. Mother and   infant are stable.  Mother will be watched carefully for signs of postpartum   anemia.  The baby is currently with mom and doing well.        ______________________________  MD LUIS BRAGG/LOLLY    DD:  10/29/2022 08:45  DT:  10/29/2022 11:50    Job#:  [id number]    CC:RADU QUINTANA MD

## 2022-10-29 NOTE — ANESTHESIA POSTPROCEDURE EVALUATION
Patient: Cinthia Alba    Procedure Summary     Date: 10/29/22 Room / Location:     Anesthesia Start: 0059 Anesthesia Stop: 0745    Procedure: Labor Epidural Diagnosis:     Scheduled Providers:  Responsible Provider: Jaskaran Salas M.D.    Anesthesia Type: epidural ASA Status: 2          Final Anesthesia Type: epidural  Last vitals  BP   Blood Pressure: 138/88    Temp   37.1 °C (98.8 °F)    Pulse   (!) 105 (RN aware)   Resp   18    SpO2   98 %      Anesthesia Post Evaluation    Patient location during evaluation: PACU  Patient participation: complete - patient participated  Level of consciousness: awake and alert  Pain score: 0    Airway patency: patent  Anesthetic complications: no  Cardiovascular status: hemodynamically stable  Respiratory status: acceptable  Hydration status: euvolemic    PONV: none          No notable events documented.     Nurse Pain Score: 2 (NPRS)

## 2022-10-29 NOTE — ANESTHESIA TIME REPORT
Anesthesia Start and Stop Event Times     Date Time Event    10/29/2022 0058 Ready for Procedure     0059 Anesthesia Start     0745 Anesthesia Stop        Responsible Staff  10/29/22    Name Role Begin End    Johnny Pedersen M.D. Anesth 0059 0705    Jaskaran Salas M.D. Anesth 0705 0745        Overtime Reason:  no overtime (within assigned shift)    Comments:

## 2022-10-29 NOTE — PROGRESS NOTES
0500- Bedside report received from Vandana CHADWICK., POC discussed, care assumed. Pt recently received a bolus dose of medication in her epidural from Dr. Moore. Pt requesting to allow medication to start working before her next VE.    0600-  at bedside for pushing support.    0700- Bedside report given to Sridevi CHADWICK., POC discussed, care relinquished. Room set for delivery. Pt and family deny any needs or questions for this RN. SBAR update given to Dr. Eid, strips discussed and expressed pts request for 1miliunit of pitocin. Provider agreed.

## 2022-10-29 NOTE — H&P
DATE OF ADMISSION:  10/28/2022     ADMITTING DIAGNOSES:  1.  Intrauterine pregnancy at 40 and 6/7 weeks.  2.  Postdates.  3.  Advanced maternal age.  4.  In vitro fertilization.     HISTORY OF PRESENT ILLNESS:  This is a 37-year-old  2, para 1 with an   estimated date of confinement of 10/22/2022 established by frozen embryo   transfer who presents today for postdates induction of labor.  She understands   there is an increased risk of an operative delivery including    section with induction of labor.  All of her questions were answered and she   agrees to induction.     Prenatal care has been with Dr. Eid.  Her estimated date of confinement of   10/22/2022 was established by frozen embryo transfer.     PRENATAL LABS:  Her blood type is O positive, antibody screen negative, RPR   nonreactive, rubella immune, hepatitis B surface antigen negative, hepatitis C   negative, HIV negative.  Her 1-hour Glucola was normal.  Her group B Strep   status is negative.  Her AFP only was low risk.     She had preimplantation genetic determination performed on the embryo and it   was 46 XY.  She declined noninvasive  screening and she reports that   her recessive gene screen was low risk.     Complications with the pregnancy include advanced maternal age and IVF.  She   did have preimplantation genetic determination performed on the embryo which   was 46 XY, it is the patient's egg retrieved at age 33 and 's sperm.    She had a total weight gain for the pregnancy of 51 pounds.  She previously   had a posterior placenta previa which has resolved.     ALLERGIES:  She has no known drug allergies.     MEDICATIONS:  Include prenatal vitamins, aspirin 81 mg and Lexapro 20 mg p.o.   every day.     PAST MEDICAL HISTORY:  Significant for depression and anxiety.     PAST SURGICAL HISTORY:  Significant for bunionectomy x2.     SOCIAL HISTORY:  She denies tobacco, alcohol or IV drug use.     FAMILY HISTORY:   She has a maternal grandmother and paternal grandmother with   postmenopausal breast cancer.     GYNECOLOGIC HISTORY:  She has no history of any HSV.  She has remote history   of an abnormal Pap with colposcopy, but no treatment.     OBSTETRICAL HISTORY:   1 was an IVF pregnancy, , 4 pounds 14   ounces.  This pregnancy was complicated by intrauterine growth restriction and   gestational hypertension.   2 is her current pregnancy.     PHYSICAL EXAMINATION:  VITAL SIGNS:  Temperature is 98.7, blood pressure 131/94, pulse is 82.  GENERAL:  She is pleasant, in no apparent distress.  LUNGS:  Clear to auscultation bilaterally.  CARDIOVASCULAR:  Regular rate and rhythm.  ABDOMEN:  Gravid and nontender.  EXTREMITIES:  Show trace pedal edema.  She has no cords or Homans sign.  PELVIC:  Fetal heart tones are in the 130s and a category 1 tracing.    Tocometry shows no contractions.  Cervical exam per the nurse on admission,   she is fingertip dilated, 20% effaced, -2 station, vertex presentation and   posterior. Estimated fetal weight is 3400 grams.     IMPRESSION:  This is a 37-year-old  2, para 1 at 40 and 6/7 weeks who   presents to labor and delivery for postdates induction of labor, who is doing   well.     PLAN:  1.  The patient has been admitted to labor and delivery.  2.  She has received 1 dose of misoprostol 25 mcg per vagina.  She will be   rechecked in 4 hours.  3.  There is currently reassuring fetal surveillance.        ______________________________  MD LUIS BRAGG/RAIMUNDO    DD:  10/28/2022 15:02  DT:  10/28/2022 17:35    Job#:  [id number]

## 2022-10-30 PROCEDURE — A9270 NON-COVERED ITEM OR SERVICE: HCPCS | Performed by: OBSTETRICS & GYNECOLOGY

## 2022-10-30 PROCEDURE — 700102 HCHG RX REV CODE 250 W/ 637 OVERRIDE(OP): Performed by: OBSTETRICS & GYNECOLOGY

## 2022-10-30 PROCEDURE — 770002 HCHG ROOM/CARE - OB PRIVATE (112)

## 2022-10-30 RX ORDER — ASCORBIC ACID 500 MG
500 TABLET ORAL 2 TIMES DAILY WITH MEALS
Status: DISCONTINUED | OUTPATIENT
Start: 2022-10-30 | End: 2022-10-30

## 2022-10-30 RX ORDER — ASCORBIC ACID 500 MG
500 TABLET ORAL 2 TIMES DAILY WITH MEALS
Status: DISCONTINUED | OUTPATIENT
Start: 2022-10-30 | End: 2022-10-31 | Stop reason: HOSPADM

## 2022-10-30 RX ADMIN — ACETAMINOPHEN 1000 MG: 500 TABLET ORAL at 09:09

## 2022-10-30 RX ADMIN — OXYCODONE HYDROCHLORIDE AND ACETAMINOPHEN 500 MG: 500 TABLET ORAL at 18:47

## 2022-10-30 RX ADMIN — IBUPROFEN 800 MG: 800 TABLET, FILM COATED ORAL at 14:39

## 2022-10-30 RX ADMIN — ACETAMINOPHEN 1000 MG: 500 TABLET ORAL at 23:39

## 2022-10-30 RX ADMIN — PRENATAL WITH FERROUS FUM AND FOLIC ACID 1 TABLET: 3080; 920; 120; 400; 22; 1.84; 3; 20; 10; 1; 12; 200; 27; 25; 2 TABLET ORAL at 09:09

## 2022-10-30 RX ADMIN — OXYCODONE HYDROCHLORIDE AND ACETAMINOPHEN 500 MG: 500 TABLET ORAL at 09:26

## 2022-10-30 RX ADMIN — DOCUSATE SODIUM 100 MG: 100 CAPSULE, LIQUID FILLED ORAL at 09:10

## 2022-10-30 RX ADMIN — IBUPROFEN 800 MG: 800 TABLET, FILM COATED ORAL at 23:39

## 2022-10-30 RX ADMIN — IBUPROFEN 800 MG: 800 TABLET, FILM COATED ORAL at 05:58

## 2022-10-30 RX ADMIN — FERROUS SULFATE TAB 325 MG (65 MG ELEMENTAL FE) 325 MG: 325 (65 FE) TAB at 18:47

## 2022-10-30 RX ADMIN — ACETAMINOPHEN 1000 MG: 500 TABLET ORAL at 16:44

## 2022-10-30 RX ADMIN — FERROUS SULFATE TAB 325 MG (65 MG ELEMENTAL FE) 325 MG: 325 (65 FE) TAB at 09:10

## 2022-10-30 ASSESSMENT — PAIN DESCRIPTION - PAIN TYPE
TYPE: ACUTE PAIN

## 2022-10-30 NOTE — PROGRESS NOTES
1945- Patient assessment completed. Patient chart and orders reviewed. Reviewed plan of care with patient and patient verbalizes understanding. Infant in NBN and reviewed visiting NBN and pt declines need of pumping assistance, reviewed frequency/length. Monitoring of patient condition will continue.     2030- Charge RN, Lakeisha, called to notify of pt lab results [facility]     2038- Notified on-call MD, Dr. Ahmadi, of pt's CBC results and pt condition. Pt has no complaints of nausea, dizziness, lightheadedness. Pt has been ambulating and showered. Orders to start pt on Ferrous sulfate 325 mg PO twice a day with meals.     2100- Pt visiting infant in NBN with FOB via wheelchair.

## 2022-10-30 NOTE — LACTATION NOTE
This note was copied from a baby's chart.  Met briefly with Cinthia while she was breast feeding messi Tineo in the nursery. She reports feeling confident in his ability to latch well and he has a strong but not uncomfortable suck. She will continue to offer the breast tonight as needed, based on baby's feeding cues. If at any time he is too tired for a latch she will use the breast pump and express milk for him.

## 2022-10-30 NOTE — CARE PLAN
The patient is Stable - Low risk of patient condition declining or worsening    Shift Goals  Clinical Goals: bond with baby, pain well controlled  Patient Goals: bonding with infant  Family Goals: support    Progress made toward(s) clinical / shift goals: patient pain well managed with motrin, patient pumping and lochia wnl  Problem: Pain - Standard  Goal: Alleviation of pain or a reduction in pain to the patient’s comfort goal  Outcome: Progressing     Problem: Knowledge Deficit - Standard  Goal: Patient and family/care givers will demonstrate understanding of plan of care, disease process/condition, diagnostic tests and medications  Outcome: Progressing     Problem: Psychosocial - Postpartum  Goal: Patient will verbalize and demonstrate effective bonding and parenting behavior  Outcome: Progressing     Problem: Altered Physiologic Condition  Goal: Patient physiologically stable as evidenced by normal lochia, palpable uterine involution and vitals within normal limits  Outcome: Progressing       Patient is not progressing towards the following goals:

## 2022-10-30 NOTE — DISCHARGE PLANNING
Discharge Planning Assessment Post Partum    Reason for Referral: Hx of anxiety and depression  Address: [address] [place] [address]  Type of Living Situation:Lives with spouse  Mom Diagnosis:  Intrauterine pregnancy at 40 and 6/7 weeks  Baby Diagnosis: Birth  Primary Language: English    Name of Baby: Noman Alba  Father of the Baby: Gildardo Alba  Involved in baby’s care? Yes, at bedside  Contact Information: [phone]    Prenatal Care: Prenatal care has been with Dr. Eid.  Her estimated date of confinement of   10/22/2022 was established by frozen embryo transfer  Mom's PCP: Doctor TRENTON SOMMERS  PCP for new baby:Doctor De Dios at [facility]     Support System: MOB reports her mother and spouse  Coping/Bonding between mother & baby: Yes. Per MOB  Source of Feeding: Breastfeeding and bottle feeding  Supplies for Infant: Car seat, bassinet for safe sleep, and reports has all other supplies    Mom's Insurance: [facility]  Baby Covered on Insurance:Will be added  Mother Employed/School: No. FOB works full time  Other children in the home/names & ages: Finaley age 2.5 and 17 year old.  Financial Hardship/Income: No/   Mom's Mental status: Alert and oriented. Mood:good. Affect: Pleasant and engaging. MOB declined suicidal and homicidal ideation.  Services used prior to admit: None, per MOB.     CPS History: No, per MOB  Psychiatric History: Yes, Depression and Anxiety. MOB takes Lexapro 20 mg p.o. MOB reports she has had depression and anxiety symptoms since her 20's. MOB reports sees a therapist at [facility]. This writer, MOB, and FOB went over signs and symptoms of post part depression and where to seek help if needed.   Domestic Violence History: No, per MOB  Drug/ETOH History: No, per MOB    Resources Provided: Verbal education on safe sleep and post partum depression. MOB agreed resources for post partum supports and mental health provider list.   Referrals Made: None.     Social  Worker Clearance for Discharge: MOB and baby are cleared by .

## 2022-10-30 NOTE — PROGRESS NOTES
POSTPARTUM DAY 1, PP hemorrhage 1500 cc    Baby is having some problems incl. Oxygen-dependence so she will stay til tomorrow.    Tolerating anemia well, denies dizziness.    No complaints.  Patient is voiding well.    PE:    Afebrile  BP normal    Uterus involuting appropriately, nontender  Perineum:  No perineal complaints, so I didn't do specific perineal exam.  Calves nontender, Tena negative bilaterally.     LAB:       10/28/22 12:50 10/29/22 08:23 10/29/22 20:02   WBC 8.0 12.5 (H) 13.6 (H)   Hemoglobin 13.1 11.9 (L) 8.2 (L)   Hematocrit 39.3 35.1 (L) 24.4 (L)   Platelet Count 175 136 (L) 120 (L)        PLAN:  PNV daily, FeSO4+Vitamin C BID x 3 weeks.  Postpartum care.  Lactation consult.  Patient desires discharge:  tomorrow  .

## 2022-10-30 NOTE — CARE PLAN
The patient is Stable - Low risk of patient condition declining or worsening    Shift Goals  Clinical Goals: Remain clinically stable, visit baby in NBN, pain management, and rest    Progress made toward(s) clinical / shift goals:  Pt verbalizes when in pain and in need of pain medication or extra comfort measures to reach comfort goal. Pt fundus is firm and palpable and not showing signs of lightheadedness, dizziness, nausea, or paleness. Pt is steady on ambulation and has showered. Pt is pumping for baby in NBN and has visited baby in NBN and stays involved in treatment plan.     Patient is not progressing towards the following goals: N/A     Problem: Pain - Standard  Goal: Alleviation of pain or a reduction in pain to the patient’s comfort goal  Outcome: Progressing     Problem: Knowledge Deficit - Postpartum  Goal: Patient will verbalize and demonstrate understanding of self and infant care  Outcome: Progressing     Problem: Psychosocial - Postpartum  Goal: Patient will verbalize and demonstrate effective bonding and parenting behavior  Outcome: Progressing     Problem: Altered Physiologic Condition  Goal: Patient physiologically stable as evidenced by normal lochia, palpable uterine involution and vitals within normal limits  Outcome: Progressing

## 2022-10-31 ENCOUNTER — PHARMACY VISIT (OUTPATIENT)
Dept: PHARMACY | Facility: MEDICAL CENTER | Age: 37
End: 2022-10-31
Payer: COMMERCIAL

## 2022-10-31 VITALS
RESPIRATION RATE: 18 BRPM | DIASTOLIC BLOOD PRESSURE: 69 MMHG | SYSTOLIC BLOOD PRESSURE: 112 MMHG | HEART RATE: 83 BPM | HEIGHT: 63 IN | WEIGHT: 182 LBS | BODY MASS INDEX: 32.25 KG/M2 | OXYGEN SATURATION: 95 % | TEMPERATURE: 97.4 F

## 2022-10-31 PROBLEM — D62 ANEMIA ASSOCIATED WITH ACUTE BLOOD LOSS: Status: ACTIVE | Noted: 2022-10-31

## 2022-10-31 PROCEDURE — RXMED WILLOW AMBULATORY MEDICATION CHARGE: Performed by: OBSTETRICS & GYNECOLOGY

## 2022-10-31 RX ORDER — PSEUDOEPHEDRINE HCL 30 MG
100 TABLET ORAL 2 TIMES DAILY PRN
Qty: 60 CAPSULE | Refills: 0 | Status: SHIPPED | OUTPATIENT
Start: 2022-10-31 | End: 2022-11-30

## 2022-10-31 RX ORDER — ASCORBIC ACID 500 MG
500 TABLET ORAL 2 TIMES DAILY WITH MEALS
Qty: 40 TABLET | Refills: 0 | Status: SHIPPED | OUTPATIENT
Start: 2022-10-31 | End: 2022-11-20

## 2022-10-31 RX ORDER — ACETAMINOPHEN 500 MG
500-1000 TABLET ORAL EVERY 6 HOURS PRN
Qty: 30 TABLET | Refills: 0 | COMMUNITY
Start: 2022-10-31

## 2022-10-31 RX ORDER — IBUPROFEN 200 MG
200-600 TABLET ORAL EVERY 6 HOURS PRN
COMMUNITY
Start: 2022-10-31 | End: 2024-02-13

## 2022-10-31 RX ORDER — FERROUS SULFATE 325(65) MG
325 TABLET ORAL 2 TIMES DAILY WITH MEALS
Qty: 40 TABLET | Refills: 0 | Status: SHIPPED | OUTPATIENT
Start: 2022-10-31 | End: 2022-11-20

## 2022-10-31 ASSESSMENT — EDINBURGH POSTNATAL DEPRESSION SCALE (EPDS)
THINGS HAVE BEEN GETTING ON TOP OF ME: NO, I HAVE BEEN COPING AS WELL AS EVER
I HAVE LOOKED FORWARD WITH ENJOYMENT TO THINGS: AS MUCH AS I EVER DID
I HAVE FELT SCARED OR PANICKY FOR NO GOOD REASON: NO, NOT MUCH
I HAVE BLAMED MYSELF UNNECESSARILY WHEN THINGS WENT WRONG: NO, NEVER
I HAVE BEEN SO UNHAPPY THAT I HAVE BEEN CRYING: NO, NEVER
I HAVE BEEN ANXIOUS OR WORRIED FOR NO GOOD REASON: HARDLY EVER
I HAVE FELT SAD OR MISERABLE: NO, NOT AT ALL
THE THOUGHT OF HARMING MYSELF HAS OCCURRED TO ME: NEVER
I HAVE BEEN SO UNHAPPY THAT I HAVE HAD DIFFICULTY SLEEPING: NOT AT ALL
I HAVE BEEN ABLE TO LAUGH AND SEE THE FUNNY SIDE OF THINGS: AS MUCH AS I ALWAYS COULD

## 2022-10-31 NOTE — PROGRESS NOTES
POSTPARTUM DAY 2, PP hemorrhage 1500 cc     C/o severely-painful hemorrhoids, desires definitive therapy.     Tolerating anemia well, denies dizziness.     No complaints.  Patient is voiding well.     PE:     Afebrile  BP normal     Uterus involuting appropriately, nontender  Perineum:  No perineal complaints, so I didn't do specific perineal exam.  Calves nontender, Tena negative bilaterally.     LAB:         10/28/22 12:50 10/29/22 08:23 10/29/22 20:02   WBC 8.0 12.5 (H) 13.6 (H)   Hemoglobin 13.1 11.9 (L) 8.2 (L)   Hematocrit 39.3 35.1 (L) 24.4 (L)   Platelet Count 175 136 (L) 120 (L)         PLAN:  PNV daily, FeSO4+Vitamin C BID x 3 weeks.  Postpartum care.  Lactation consult.  Patient desires discharge:  today  .    F/U 6 wks Dr. Eid, see Dr. Perez.

## 2022-10-31 NOTE — CARE PLAN
The patient is Stable - Low risk of patient condition declining or worsening    Shift Goals  Clinical Goals: Establish breast feeding  Patient Goals: bonding with infant  Family Goals: support    Progress made toward(s) clinical / shift goals:  Infant is latching successfully, appears to be cluster feeding. MOB is latching baby independently.    Patient is not progressing towards the following goals:

## 2022-10-31 NOTE — DISCHARGE SUMMARY
Discharge Summary:      Cinthia Alba    Admit Date:   10/28/2022  Discharge Date:  10/31/2022     Admitting diagnosis:  Labor and delivery, indication for care [O75.9]  Discharge Diagnosis: Status post vaginal, spontaneous. Anemia secondary to blood loss. Postpartum hemorrhage.  Pregnancy Complications: none  Tubal Ligation:  no        History:  Past Medical History:   Diagnosis Date    Psychiatric problem      OB History    Para Term  AB Living   2 2 2 0 0 2   SAB IAB Ectopic Molar Multiple Live Births   0 0 0   0 2      # Outcome Date GA Lbr Shane/2nd Weight Sex Delivery Anes PTL Lv   2 Term 10/29/22 41w0d / 02:45 3.355 kg (7 lb 6.3 oz) M Vag-Spont EPI N CARRILLO   1 Term 20 37w2d 01:00 / 01:03 2.21 kg (4 lb 14 oz) F Vag-Spont EPI N CARRILLO        Patient has no known allergies.  Patient Active Problem List    Diagnosis Date Noted    Postpartum hemorrhage 10/31/2022    Anemia associated with acute blood loss 10/31/2022    Labor and delivery indication for care or intervention 10/28/2022    Depression with anxiety 2018    Pure hypercholesterolemia 2017    Bruxism (teeth grinding) 2017    Hx of abnormal cervical Pap smear 2017        Hospital Course:   37 y.o. , now para 2, was admitted with the above mentioned diagnosis, underwent Active Labor, vaginal, spontaneous. Patient postpartum course was remarkable for anemia secondary to blood loss, with progressive advancement in diet , ambulation and toleration of oral analgesia. Patient without complaints today and desires discharge.      Vitals:    10/30/22 1750 10/30/22 2200 10/31/22 0200 10/31/22 0600   BP: 107/73 103/62 116/66 112/69   Pulse: 98 74 89 83   Resp: 18 18 18 18   Temp: 36.8 °C (98.2 °F) 36.1 °C (97 °F) 36.8 °C (98.2 °F) 36.3 °C (97.4 °F)   TempSrc: Temporal Temporal Temporal Temporal   SpO2: 98% 99% 97% 95%   Weight:       Height:           Current Facility-Administered Medications   Medication Dose     ascorbic acid (Vitamin C) tablet 500 mg  500 mg    ropivacaine 0.2 % (NAROPIN) injection      lactated ringers infusion      docusate sodium (COLACE) capsule 100 mg  100 mg    ibuprofen (MOTRIN) tablet 800 mg  800 mg    acetaminophen (TYLENOL) tablet 1,000 mg  1,000 mg    PRN oxytocin (PITOCIN) (20 Units/1000 mL) PRN for excessive uterine bleeding - See Admin Instr  125-999 mL/hr    miSOPROStol (CYTOTEC) tablet 800 mcg  800 mcg    methylergonovine (METHERGINE) injection 0.2 mg  0.2 mg    carboPROST (HEMABATE) injection 250 mcg  250 mcg    bisacodyl (DULCOLAX) suppository 10 mg  10 mg    oxyCODONE immediate-release (ROXICODONE) tablet 5 mg  5 mg    prenatal plus vitamin (STUARTNATAL 1+1) 27-1 MG tablet 1 Tablet  1 Tablet    ferrous sulfate tablet 325 mg  325 mg    oxytocin (PITOCIN) infusion (for post delivery)  125 mL/hr    D5LR infusion      oxytocin (Pitocin) 0.02 Units/mL LR (induction of labor)  0.5-20 dedrick-units/min       Exam:  Breast Exam: negative  Abdomen: Abdomen soft, non-tender. BS normal. No masses,  No organomegaly  Fundus Non Tender: yes  Incision: none  Perineum: perineum intact  Extremity: extremities, peripheral pulses and reflexes normal     Labs:  Recent Labs     10/28/22  1250 10/29/22  0823 10/29/22  2002   WBC 8.0 12.5* 13.6*   RBC 4.11* 3.58* 2.47*   HEMOGLOBIN 13.1 11.9* 8.2*   HEMATOCRIT 39.3 35.1* 24.4*   MCV 95.6 98.0* 98.8*   MCH 31.9 33.2* 33.2*   MCHC 33.3* 33.9 33.6   RDW 46.5 47.5 47.8   PLATELETCT 175 136* 120*   MPV 11.2 11.1 11.3        Activity:   Discharge to home  Pelvic Rest x 6 weeks    Assessment:  Course complicated by severe anemia from blood loss.  Discharge Assessment: No heavy bleeding or foul vaginal discharge      Follow up: Dr. Eid, 6 wks.  Dr Perez, 2 months (for hemorrhoids).      Discharge Meds:   Current Outpatient Medications   Medication Sig Dispense Refill    acetaminophen (TYLENOL) 500 MG Tab Take 1-2 Tablets by mouth every 6 hours as needed for  Moderate Pain or Mild Pain. 30 Tablet 0    ascorbic acid (VITAMIN C) 500 MG tablet Take 1 Tablet by mouth 2 times a day with meals for 20 days. 40 Tablet 0    docusate sodium 100 MG Cap Take 100 mg by mouth 2 times a day as needed for Constipation for up to 30 days. 60 Capsule 0    ferrous sulfate 325 (65 Fe) MG tablet Take 1 Tablet by mouth 2 times a day with meals for 20 days. 40 Tablet 0    ibuprofen (MOTRIN) 200 MG Tab Take 1-3 Tablets by mouth every 6 hours as needed for Moderate Pain or Mild Pain.         Dawit Ahmadi M.D.

## 2022-10-31 NOTE — DISCHARGE INSTRUCTIONS

## 2022-10-31 NOTE — LACTATION NOTE
This note was copied from a baby's chart.  Mother latching baby and reports that feeding at breast is going well. Latch improved overnight. Resources post discharge discussed.

## 2022-10-31 NOTE — DISCHARGE PLANNING
Meds-to-Beds: Discharge prescription orders listed below delivered to patient's bedside. RN notified. Patient counseled. Patient elected to have co-payment billed to patient account.      Current Outpatient Medications   Medication Sig Dispense Refill    ascorbic acid (VITAMIN C) 500 MG tablet Take 1 Tablet by mouth 2 times a day with meals for 20 days. 40 Tablet 0    docusate sodium 100 MG Cap Take 100 mg by mouth 2 times a day as needed for Constipation for up to 30 days. 60 Capsule 0    ferrous sulfate 325 (65 Fe) MG tablet Take 1 Tablet by mouth 2 times a day with meals for 20 days. 40 Tablet 0      Mayela Corona, PharmD

## 2022-10-31 NOTE — PROGRESS NOTES
Assessment done. Pt. Has no c/o pain.Fundus firm.Lochia light. Fob at bedside,supportive.patient continuing to pump baby in nursery under may.

## 2022-10-31 NOTE — CARE PLAN
The patient is Stable - Low risk of patient condition declining or worsening    Shift Goals  Clinical Goals: visit baby in nursery to bond. pain well controlled  Patient Goals: bonding with infant  Family Goals: support    Progress made toward(s) clinical / shift goals:    Problem: Pain - Standard  Goal: Alleviation of pain or a reduction in pain to the patient’s comfort goal  Outcome: Progressing pain well controlled with pain medication     Problem: Knowledge Deficit - Standard  Goal: Patient and family/care givers will demonstrate understanding of plan of care, disease process/condition, diagnostic tests and medications  Outcome: Progressing     Problem: Knowledge Deficit - Postpartum  Goal: Patient will verbalize and demonstrate understanding of self and infant care  Outcome: Progressing     Problem: Psychosocial - Postpartum  Goal: Patient will verbalize and demonstrate effective bonding and parenting behavior  Outcome: Progressing bonding well with baby     Problem: Altered Physiologic Condition  Goal: Patient physiologically stable as evidenced by normal lochia, palpable uterine involution and vitals within normal limits  Outcome: Progressing lochia wnl       Patient is not progressing towards the following goals:

## 2023-10-30 ENCOUNTER — NON-PROVIDER VISIT (OUTPATIENT)
Dept: OCCUPATIONAL MEDICINE | Facility: CLINIC | Age: 38
End: 2023-10-30

## 2023-10-30 DIAGNOSIS — Z02.1 PRE-EMPLOYMENT DRUG SCREENING: ICD-10-CM

## 2023-10-30 DIAGNOSIS — Z11.1 PPD SCREENING TEST: ICD-10-CM

## 2023-10-30 DIAGNOSIS — Z02.83 ENCOUNTER FOR DRUG SCREENING: ICD-10-CM

## 2023-10-30 LAB
AMP AMPHETAMINE: NORMAL
COC COCAINE: NORMAL
INT CON NEG: NORMAL
INT CON POS: NORMAL
MET METHAMPHETAMINES: NORMAL
OPI OPIATES: NORMAL
PCP PHENCYCLIDINE: NORMAL
POC DRUG COMMENT 753798-OCCUPATIONAL HEALTH: NORMAL
THC: NORMAL

## 2023-10-30 PROCEDURE — 86580 TB INTRADERMAL TEST: CPT | Performed by: NURSE PRACTITIONER

## 2023-10-30 PROCEDURE — 80305 DRUG TEST PRSMV DIR OPT OBS: CPT | Performed by: NURSE PRACTITIONER

## 2023-11-01 ENCOUNTER — NON-PROVIDER VISIT (OUTPATIENT)
Dept: OCCUPATIONAL MEDICINE | Facility: CLINIC | Age: 38
End: 2023-11-01

## 2023-11-01 LAB — TB WHEAL 3D P 5 TU DIAM: NORMAL MM

## 2023-11-06 ENCOUNTER — NON-PROVIDER VISIT (OUTPATIENT)
Dept: OCCUPATIONAL MEDICINE | Facility: CLINIC | Age: 38
End: 2023-11-06

## 2023-11-06 DIAGNOSIS — Z11.1 ENCOUNTER FOR PPD TEST: Primary | ICD-10-CM

## 2023-11-06 PROCEDURE — 86580 TB INTRADERMAL TEST: CPT | Performed by: NURSE PRACTITIONER

## 2023-11-08 ENCOUNTER — NON-PROVIDER VISIT (OUTPATIENT)
Dept: OCCUPATIONAL MEDICINE | Facility: CLINIC | Age: 38
End: 2023-11-08

## 2023-11-08 LAB — TB WHEAL 3D P 5 TU DIAM: NORMAL MM

## 2024-01-04 ENCOUNTER — HOSPITAL ENCOUNTER (EMERGENCY)
Facility: MEDICAL CENTER | Age: 39
End: 2024-01-04
Attending: EMERGENCY MEDICINE
Payer: COMMERCIAL

## 2024-01-04 VITALS
HEIGHT: 62 IN | RESPIRATION RATE: 17 BRPM | DIASTOLIC BLOOD PRESSURE: 76 MMHG | OXYGEN SATURATION: 97 % | BODY MASS INDEX: 23.57 KG/M2 | HEART RATE: 67 BPM | SYSTOLIC BLOOD PRESSURE: 120 MMHG | TEMPERATURE: 97.2 F | WEIGHT: 128.09 LBS

## 2024-01-04 DIAGNOSIS — G47.00 INSOMNIA, UNSPECIFIED TYPE: Primary | ICD-10-CM

## 2024-01-04 LAB
ALBUMIN SERPL BCP-MCNC: 4.1 G/DL (ref 3.2–4.9)
ALBUMIN/GLOB SERPL: 1.5 G/DL
ALP SERPL-CCNC: 58 U/L (ref 30–99)
ALT SERPL-CCNC: 16 U/L (ref 2–50)
ANION GAP SERPL CALC-SCNC: 11 MMOL/L (ref 7–16)
AST SERPL-CCNC: 18 U/L (ref 12–45)
BASOPHILS # BLD AUTO: 0.4 % (ref 0–1.8)
BASOPHILS # BLD: 0.03 K/UL (ref 0–0.12)
BILIRUB SERPL-MCNC: 0.7 MG/DL (ref 0.1–1.5)
BUN SERPL-MCNC: 16 MG/DL (ref 8–22)
CALCIUM ALBUM COR SERPL-MCNC: 8.6 MG/DL (ref 8.5–10.5)
CALCIUM SERPL-MCNC: 8.7 MG/DL (ref 8.5–10.5)
CHLORIDE SERPL-SCNC: 109 MMOL/L (ref 96–112)
CO2 SERPL-SCNC: 22 MMOL/L (ref 20–33)
CREAT SERPL-MCNC: 0.43 MG/DL (ref 0.5–1.4)
EOSINOPHIL # BLD AUTO: 0.01 K/UL (ref 0–0.51)
EOSINOPHIL NFR BLD: 0.1 % (ref 0–6.9)
ERYTHROCYTE [DISTWIDTH] IN BLOOD BY AUTOMATED COUNT: 40.3 FL (ref 35.9–50)
GFR SERPLBLD CREATININE-BSD FMLA CKD-EPI: 128 ML/MIN/1.73 M 2
GLOBULIN SER CALC-MCNC: 2.8 G/DL (ref 1.9–3.5)
GLUCOSE SERPL-MCNC: 97 MG/DL (ref 65–99)
HCT VFR BLD AUTO: 43.9 % (ref 37–47)
HGB BLD-MCNC: 14.9 G/DL (ref 12–16)
IMM GRANULOCYTES # BLD AUTO: 0.03 K/UL (ref 0–0.11)
IMM GRANULOCYTES NFR BLD AUTO: 0.4 % (ref 0–0.9)
LYMPHOCYTES # BLD AUTO: 1.42 K/UL (ref 1–4.8)
LYMPHOCYTES NFR BLD: 17.3 % (ref 22–41)
MCH RBC QN AUTO: 30.3 PG (ref 27–33)
MCHC RBC AUTO-ENTMCNC: 33.9 G/DL (ref 32.2–35.5)
MCV RBC AUTO: 89.4 FL (ref 81.4–97.8)
MONOCYTES # BLD AUTO: 0.31 K/UL (ref 0–0.85)
MONOCYTES NFR BLD AUTO: 3.8 % (ref 0–13.4)
NEUTROPHILS # BLD AUTO: 6.43 K/UL (ref 1.82–7.42)
NEUTROPHILS NFR BLD: 78 % (ref 44–72)
NRBC # BLD AUTO: 0 K/UL
NRBC BLD-RTO: 0 /100 WBC (ref 0–0.2)
PLATELET # BLD AUTO: 156 K/UL (ref 164–446)
PMV BLD AUTO: 11.4 FL (ref 9–12.9)
POTASSIUM SERPL-SCNC: 4.1 MMOL/L (ref 3.6–5.5)
PROT SERPL-MCNC: 6.9 G/DL (ref 6–8.2)
RBC # BLD AUTO: 4.91 M/UL (ref 4.2–5.4)
SODIUM SERPL-SCNC: 142 MMOL/L (ref 135–145)
TSH SERPL DL<=0.005 MIU/L-ACNC: 0.97 UIU/ML (ref 0.38–5.33)
VIT B12 SERPL-MCNC: 2542 PG/ML (ref 211–911)
WBC # BLD AUTO: 8.2 K/UL (ref 4.8–10.8)

## 2024-01-04 PROCEDURE — 85025 COMPLETE CBC W/AUTO DIFF WBC: CPT

## 2024-01-04 PROCEDURE — 700105 HCHG RX REV CODE 258: Performed by: EMERGENCY MEDICINE

## 2024-01-04 PROCEDURE — 36415 COLL VENOUS BLD VENIPUNCTURE: CPT

## 2024-01-04 PROCEDURE — 80053 COMPREHEN METABOLIC PANEL: CPT

## 2024-01-04 PROCEDURE — 99283 EMERGENCY DEPT VISIT LOW MDM: CPT

## 2024-01-04 PROCEDURE — 84443 ASSAY THYROID STIM HORMONE: CPT

## 2024-01-04 PROCEDURE — 82652 VIT D 1 25-DIHYDROXY: CPT

## 2024-01-04 PROCEDURE — 82607 VITAMIN B-12: CPT

## 2024-01-04 RX ORDER — SODIUM CHLORIDE 9 MG/ML
1000 INJECTION, SOLUTION INTRAVENOUS ONCE
Status: COMPLETED | OUTPATIENT
Start: 2024-01-04 | End: 2024-01-04

## 2024-01-04 RX ADMIN — SODIUM CHLORIDE 1000 ML: 9 INJECTION, SOLUTION INTRAVENOUS at 10:51

## 2024-01-04 ASSESSMENT — PAIN DESCRIPTION - PAIN TYPE: TYPE: ACUTE PAIN

## 2024-01-04 ASSESSMENT — FIBROSIS 4 INDEX: FIB4 SCORE: 1.59

## 2024-01-04 NOTE — ED PROVIDER NOTES
ER Provider Note    Scribed for Chau Galan Ii, M.d. by Elisabet Martínez. 2024  10:22 AM    Primary Care Provider: Jada Black M.D.    CHIEF COMPLAINT  Chief Complaint   Patient presents with    Difficulty Sleeping     Pt taking 0.25 mg xanex for sleep issues secondary to postpartum depression.  She reports she has not been able to sleep more that 2 hours at a time x 4 weeks.    Nausea     EXTERNAL RECORDS REVIEWED  Inpatient Notes The patient was admitted on 10/28/2022 for labor and delivery.    HPI/ROS  LIMITATION TO HISTORY   Select: : None  OUTSIDE HISTORIAN(S):  Significant other  at bedside to confirm sequence of events and collateral information provided.     Cinthia Alba is a 38 y.o. female who presents to the ED for evaluation of insomina the past couple weeks. She describes that she has 2 children; a 3 and a half year old and a 1 year old. The patient notes that the 1 year old struggles to sleep and has been for his entire life. The patient reports that she has history of anxiety and this constant lack of sleep has increased her anxiety. Even when the patient's children are sound asleep, she now is struggling to go sleep or stay asleep. The patient also mentions that she wakes up almost every hour of the night and walks around. The patient feels as though she is unable to function secondary to this lack of sleep. She also states that she has decreased appetite and as a result, has lost 10 pounds within this past month. After her first child, the patient tried the [facility]  program, so she decided to rejoin the program the day after Abi. She has been prescribed medication  She was started on escitalopram for depression and prescribed Xanax for anxiety. She has been taking 0.25 mg of Xanex for the past few days throughout the day. She believes that she needs more immediate help, as she has not been eating or sleeping well and feels as though she is  "deteriorating. She questions if she is overly dehydrated or if there is another underlying medical problem.       PAST MEDICAL HISTORY  Past Medical History:   Diagnosis Date    Psychiatric problem      SURGICAL HISTORY  Past Surgical History:   Procedure Laterality Date    FOOT SURGERY Bilateral     bunionectomy     FAMILY HISTORY  Family History   Problem Relation Age of Onset    Psychiatric Illness Mother     Hypertension Father     Cancer Father 60        melanoma    No Known Problems Sister      SOCIAL HISTORY   reports that she has never smoked. She has never used smokeless tobacco. She reports that she does not currently use alcohol. She reports that she does not use drugs.    CURRENT MEDICATIONS  Previous Medications    ACETAMINOPHEN (TYLENOL) 500 MG TAB    Take 1-2 Tablets by mouth every 6 hours as needed for Moderate Pain or Mild Pain.    HOMEOPATHIC PRODUCTS (CIMICIFUGA HOMACCORD PO)    Take  by mouth.    IBUPROFEN (MOTRIN) 200 MG TAB    Take 1-3 Tablets by mouth every 6 hours as needed for Moderate Pain or Mild Pain.    PRENATAL MV-MIN-FE FUM-FA-DHA (PRENATAL 1 PO)    Take  by mouth.     ALLERGIES  Patient has no known allergies.      PHYSICAL EXAM  /84   Pulse 82   Temp 36.8 °C (98.2 °F) (Temporal)   Resp 14   Ht 1.575 m (5' 2\")   Wt 58.1 kg (128 lb 1.4 oz)   LMP 12/12/2023   SpO2 97%   BMI 23.43 kg/m²     Physical Exam  Vitals and nursing note reviewed.   Constitutional:       Appearance: Normal appearance.   HENT:      Head: Normocephalic.      Nose: Nose normal.      Mouth/Throat:      Mouth: Mucous membranes are moist.   Eyes:      Pupils: Pupils are equal, round, and reactive to light.   Cardiovascular:      Rate and Rhythm: Normal rate.   Pulmonary:      Effort: Pulmonary effort is normal.   Musculoskeletal:         General: No swelling.      Cervical back: Normal range of motion and neck supple. No tenderness.   Lymphadenopathy:      Cervical: No cervical adenopathy.   Neurological: "      General: No focal deficit present.      Mental Status: She is alert.   Psychiatric:      Comments: Mildy anxious, fatigued             DIAGNOSTIC STUDIES    Labs:   Results for orders placed or performed during the hospital encounter of 01/04/24   CBC WITH DIFFERENTIAL   Result Value Ref Range    WBC 8.2 4.8 - 10.8 K/uL    RBC 4.91 4.20 - 5.40 M/uL    Hemoglobin 14.9 12.0 - 16.0 g/dL    Hematocrit 43.9 37.0 - 47.0 %    MCV 89.4 81.4 - 97.8 fL    MCH 30.3 27.0 - 33.0 pg    MCHC 33.9 32.2 - 35.5 g/dL    RDW 40.3 35.9 - 50.0 fL    Platelet Count 156 (L) 164 - 446 K/uL    MPV 11.4 9.0 - 12.9 fL    Neutrophils-Polys 78.00 (H) 44.00 - 72.00 %    Lymphocytes 17.30 (L) 22.00 - 41.00 %    Monocytes 3.80 0.00 - 13.40 %    Eosinophils 0.10 0.00 - 6.90 %    Basophils 0.40 0.00 - 1.80 %    Immature Granulocytes 0.40 0.00 - 0.90 %    Nucleated RBC 0.00 0.00 - 0.20 /100 WBC    Neutrophils (Absolute) 6.43 1.82 - 7.42 K/uL    Lymphs (Absolute) 1.42 1.00 - 4.80 K/uL    Monos (Absolute) 0.31 0.00 - 0.85 K/uL    Eos (Absolute) 0.01 0.00 - 0.51 K/uL    Baso (Absolute) 0.03 0.00 - 0.12 K/uL    Immature Granulocytes (abs) 0.03 0.00 - 0.11 K/uL    NRBC (Absolute) 0.00 K/uL   TSH WITH REFLEX TO FT4   Result Value Ref Range    TSH 0.970 0.380 - 5.330 uIU/mL   VITAMIN B12   Result Value Ref Range    Vitamin B12 -True Cobalamin 2542 (H) 211 - 911 pg/mL   Comp Metabolic Panel   Result Value Ref Range    Sodium 142 135 - 145 mmol/L    Potassium 4.1 3.6 - 5.5 mmol/L    Chloride 109 96 - 112 mmol/L    Co2 22 20 - 33 mmol/L    Anion Gap 11.0 7.0 - 16.0    Glucose 97 65 - 99 mg/dL    Bun 16 8 - 22 mg/dL    Creatinine 0.43 (L) 0.50 - 1.40 mg/dL    Calcium 8.7 8.5 - 10.5 mg/dL    Correct Calcium 8.6 8.5 - 10.5 mg/dL    AST(SGOT) 18 12 - 45 U/L    ALT(SGPT) 16 2 - 50 U/L    Alkaline Phosphatase 58 30 - 99 U/L    Total Bilirubin 0.7 0.1 - 1.5 mg/dL    Albumin 4.1 3.2 - 4.9 g/dL    Total Protein 6.9 6.0 - 8.2 g/dL    Globulin 2.8 1.9 - 3.5 g/dL     A-G Ratio 1.5 g/dL   ESTIMATED GFR   Result Value Ref Range    GFR (CKD-EPI) 128 >60 mL/min/1.73 m 2        COURSE & MEDICAL DECISION MAKING     INITIAL ASSESSMENT, COURSE AND PLAN  Care Narrative:     11:27 AM - Patient seen and examined at bedside. The patient is a 38 year old female who presents with increased difficulty sleeping and nausea. The patient has 2 children and notes that her most recent has difficulty sleeping. The patient has history of anxiety and this followed by the lack of sleep that she has been getting is causing great disruption in her life. The patient notes that she has lost her appetite and has lost about 10 pounds within the past month. The patient is currently taking Xanex and an SSRI, but still is having no alleviation. CBC, CMP, TSH ordered. Patient agrees to the plan of care. The patient will be given 1L NS for symptoms of dehydration.    10:55 AM - Spoke with PA at [facility] and talked about the patient's vitamin levels. Vitamin B12 and Vitamin D levels added.     1:27 PM  CBC, CMP normal. B12 above normal (likely from supplementation). Vitamin D pending. [facility] prescribed her remeron today for sleep. She reports feeling dramatically improved after fluids.         PROBLEM LIST  #Insomina   -discussed sleep hygiene   -discussed continue follow up with providers at [facility] and medications prescribed by them.     #Depression/anxiety   -taking xanax and escitalopram    DISPOSITION AND DISCUSSIONS  I have discussed management of the patient with the following physicians and UVALDO's:  PA at [facility]      FINAL DIANGOSIS  1. Insomnia, unspecified type       I, Elisabet Martínez (Yadira), am scribing for, and in the presence of, GLORIA Murphy II.    Electronically signed by: Elisabet Martínez (Rianaibbarney), 1/4/2024    IChau II, M* personally performed the services described in this documentation, as scribed by Elisabet  Ankita Martínez in my presence, and it is both accurate and complete.      The note accurately reflects work and decisions made by me.  Chau Galan II, M.D.  1/4/2024  1:30 PM

## 2024-01-04 NOTE — ED NOTES
Pt discharged home. GCS 15. IV discontinued and gauze placed, pt in possession of belongings. Pt provided discharge education and information pertaining to follow up appointments. Pt received copy of discharge instructions and verbalized understanding.     Vitals:    01/04/24 1337   BP: 120/76   Pulse: 67   Resp: 17   Temp: 36.2 °C (97.2 °F)   SpO2: 97%

## 2024-01-04 NOTE — ED TRIAGE NOTES
Chief Complaint   Patient presents with    Difficulty Sleeping     Pt taking 0.25 mg xanex for sleep issues secondary to postpartum depression.  She reports she has not been able to sleep more that 2 hours at a time x 4 weeks.    Nausea       Pt states she has some intermittent vision changes secondary to sleep deprivation and possible effect from the xanex dosing.  No neuro deficit noted.

## 2024-01-04 NOTE — ED NOTES
Checked on bed, connected to monitor,  with unlabored respirations. Vitals monitoring in progress.   Denied any new complaints. No current needs identified.  Gurney in low position, side rail up for pt safety. Call light within reach.

## 2024-01-05 LAB — 1,25(OH)2D3 SERPL-MCNC: 110 PG/ML (ref 19.9–79.3)

## 2024-01-31 ENCOUNTER — HOSPITAL ENCOUNTER (OUTPATIENT)
Dept: LAB | Facility: MEDICAL CENTER | Age: 39
End: 2024-01-31
Payer: COMMERCIAL

## 2024-01-31 LAB
25(OH)D3 SERPL-MCNC: 81 NG/ML (ref 30–100)
ALBUMIN SERPL BCP-MCNC: 4.2 G/DL (ref 3.2–4.9)
ALBUMIN/GLOB SERPL: 1.4 G/DL
ALP SERPL-CCNC: 59 U/L (ref 30–99)
ALT SERPL-CCNC: 14 U/L (ref 2–50)
ANION GAP SERPL CALC-SCNC: 11 MMOL/L (ref 7–16)
AST SERPL-CCNC: 19 U/L (ref 12–45)
BILIRUB SERPL-MCNC: 0.6 MG/DL (ref 0.1–1.5)
BUN SERPL-MCNC: 14 MG/DL (ref 8–22)
CALCIUM ALBUM COR SERPL-MCNC: 9.2 MG/DL (ref 8.5–10.5)
CALCIUM SERPL-MCNC: 9.4 MG/DL (ref 8.5–10.5)
CHLORIDE SERPL-SCNC: 105 MMOL/L (ref 96–112)
CHOLEST SERPL-MCNC: 166 MG/DL (ref 100–199)
CO2 SERPL-SCNC: 24 MMOL/L (ref 20–33)
CREAT SERPL-MCNC: 0.67 MG/DL (ref 0.5–1.4)
FOLATE SERPL-MCNC: 19.1 NG/ML
GFR SERPLBLD CREATININE-BSD FMLA CKD-EPI: 115 ML/MIN/1.73 M 2
GLOBULIN SER CALC-MCNC: 3.1 G/DL (ref 1.9–3.5)
GLUCOSE SERPL-MCNC: 81 MG/DL (ref 65–99)
HDLC SERPL-MCNC: 51 MG/DL
LDLC SERPL CALC-MCNC: 107 MG/DL
POTASSIUM SERPL-SCNC: 4.9 MMOL/L (ref 3.6–5.5)
PROT SERPL-MCNC: 7.3 G/DL (ref 6–8.2)
SODIUM SERPL-SCNC: 140 MMOL/L (ref 135–145)
TRIGL SERPL-MCNC: 39 MG/DL (ref 0–149)
TSH SERPL DL<=0.005 MIU/L-ACNC: 1.1 UIU/ML (ref 0.38–5.33)
VIT B12 SERPL-MCNC: 1714 PG/ML (ref 211–911)

## 2024-01-31 PROCEDURE — 85025 COMPLETE CBC W/AUTO DIFF WBC: CPT

## 2024-01-31 PROCEDURE — 82607 VITAMIN B-12: CPT

## 2024-01-31 PROCEDURE — 84443 ASSAY THYROID STIM HORMONE: CPT

## 2024-01-31 PROCEDURE — 36415 COLL VENOUS BLD VENIPUNCTURE: CPT

## 2024-01-31 PROCEDURE — 80053 COMPREHEN METABOLIC PANEL: CPT

## 2024-01-31 PROCEDURE — 82746 ASSAY OF FOLIC ACID SERUM: CPT

## 2024-01-31 PROCEDURE — 83036 HEMOGLOBIN GLYCOSYLATED A1C: CPT

## 2024-01-31 PROCEDURE — 80061 LIPID PANEL: CPT

## 2024-01-31 PROCEDURE — 82306 VITAMIN D 25 HYDROXY: CPT

## 2024-02-01 LAB
BASOPHILS # BLD AUTO: 0.5 % (ref 0–1.8)
BASOPHILS # BLD: 0.03 K/UL (ref 0–0.12)
EOSINOPHIL # BLD AUTO: 0.02 K/UL (ref 0–0.51)
EOSINOPHIL NFR BLD: 0.3 % (ref 0–6.9)
ERYTHROCYTE [DISTWIDTH] IN BLOOD BY AUTOMATED COUNT: 45.1 FL (ref 35.9–50)
EST. AVERAGE GLUCOSE BLD GHB EST-MCNC: 105 MG/DL
HBA1C MFR BLD: 5.3 % (ref 4–5.6)
HCT VFR BLD AUTO: 42.3 % (ref 37–47)
HGB BLD-MCNC: 14.2 G/DL (ref 12–16)
IMM GRANULOCYTES # BLD AUTO: 0.01 K/UL (ref 0–0.11)
IMM GRANULOCYTES NFR BLD AUTO: 0.2 % (ref 0–0.9)
LYMPHOCYTES # BLD AUTO: 1.54 K/UL (ref 1–4.8)
LYMPHOCYTES NFR BLD: 26.1 % (ref 22–41)
MCH RBC QN AUTO: 31.1 PG (ref 27–33)
MCHC RBC AUTO-ENTMCNC: 33.6 G/DL (ref 32.2–35.5)
MCV RBC AUTO: 92.8 FL (ref 81.4–97.8)
MONOCYTES # BLD AUTO: 0.44 K/UL (ref 0–0.85)
MONOCYTES NFR BLD AUTO: 7.5 % (ref 0–13.4)
NEUTROPHILS # BLD AUTO: 3.86 K/UL (ref 1.82–7.42)
NEUTROPHILS NFR BLD: 65.4 % (ref 44–72)
NRBC # BLD AUTO: 0 K/UL
NRBC BLD-RTO: 0 /100 WBC (ref 0–0.2)
PLATELET # BLD AUTO: 167 K/UL (ref 164–446)
PMV BLD AUTO: 11.9 FL (ref 9–12.9)
RBC # BLD AUTO: 4.56 M/UL (ref 4.2–5.4)
WBC # BLD AUTO: 5.9 K/UL (ref 4.8–10.8)

## 2024-02-06 RX ORDER — ESCITALOPRAM OXALATE 20 MG/1
TABLET ORAL
COMMUNITY
Start: 2024-01-02 | End: 2024-02-13

## 2024-02-06 RX ORDER — MIRTAZAPINE 15 MG/1
TABLET, FILM COATED ORAL
COMMUNITY
Start: 2024-01-04

## 2024-02-06 RX ORDER — HYDROXYZINE HYDROCHLORIDE 25 MG/1
TABLET, FILM COATED ORAL
COMMUNITY
Start: 2024-01-09 | End: 2024-02-13

## 2024-02-06 RX ORDER — ESCITALOPRAM OXALATE 10 MG/1
TABLET ORAL
COMMUNITY
Start: 2023-12-27 | End: 2024-02-13

## 2024-02-07 ENCOUNTER — OFFICE VISIT (OUTPATIENT)
Dept: MEDICAL GROUP | Facility: MEDICAL CENTER | Age: 39
End: 2024-02-07
Payer: COMMERCIAL

## 2024-02-07 VITALS
OXYGEN SATURATION: 99 % | WEIGHT: 125.88 LBS | HEART RATE: 70 BPM | TEMPERATURE: 97.2 F | BODY MASS INDEX: 23.02 KG/M2 | DIASTOLIC BLOOD PRESSURE: 70 MMHG | SYSTOLIC BLOOD PRESSURE: 122 MMHG

## 2024-02-07 DIAGNOSIS — E67.3 HYPERVITAMINOSIS D: ICD-10-CM

## 2024-02-07 DIAGNOSIS — F51.01 PRIMARY INSOMNIA: ICD-10-CM

## 2024-02-07 DIAGNOSIS — E67.8 HYPERVITAMINOSIS, B COMPLEX: ICD-10-CM

## 2024-02-07 PROCEDURE — 3074F SYST BP LT 130 MM HG: CPT | Performed by: STUDENT IN AN ORGANIZED HEALTH CARE EDUCATION/TRAINING PROGRAM

## 2024-02-07 PROCEDURE — 3078F DIAST BP <80 MM HG: CPT | Performed by: STUDENT IN AN ORGANIZED HEALTH CARE EDUCATION/TRAINING PROGRAM

## 2024-02-07 PROCEDURE — 99214 OFFICE O/P EST MOD 30 MIN: CPT | Performed by: STUDENT IN AN ORGANIZED HEALTH CARE EDUCATION/TRAINING PROGRAM

## 2024-02-07 RX ORDER — TRAZODONE HYDROCHLORIDE 50 MG/1
25-50 TABLET ORAL NIGHTLY
Qty: 30 TABLET | Refills: 1 | Status: CANCELLED | OUTPATIENT
Start: 2024-02-07

## 2024-02-07 RX ORDER — MIRTAZAPINE 7.5 MG/1
TABLET, FILM COATED ORAL
Qty: 10 TABLET | Refills: 0 | Status: CANCELLED
Start: 2024-02-07 | End: 2024-02-10

## 2024-02-07 ASSESSMENT — FIBROSIS 4 INDEX: FIB4 SCORE: 1.13

## 2024-02-07 ASSESSMENT — PATIENT HEALTH QUESTIONNAIRE - PHQ9: CLINICAL INTERPRETATION OF PHQ2 SCORE: 0

## 2024-02-07 NOTE — PROGRESS NOTES
Subjective:     CC:   Chief Complaint   Patient presents with    Lab Results     Follow up , concerns of elevated B12     Insomnia     Feels like she's being snapped awake         HPI:   Cinthia presents today for labs follow up     Elevated    Elevated B 12 levels 1714 ,  Elevated Vit D 110   Normal TSH 1.1     Patient was taking vit B 12 and vit D supplements as she was feeling tired and having trouble sleeping. But her vitamin levels are very high -- recommended to stop B 12 and vit D supplements. Patient has stopped taking it for more than a month . Worried about it and wondering if any other treatment to bring the levels down sooner.   Counseling and education provided that stopping the supplements will improve the levels slowly.  Re evaluate labs in  3 months .    Insomnia:   This is very concerning for patient   Since child birth ( 15 months) , patient is having difficulty sleeping , initially due to  but has not improved but gradually worsened.   Has been following up with [facility] for therapy and prescription for sleep. Has tried sleep hygiene, melatonin, over-the-counter sleep aids without much help.  She has tried mirtazapine 15 mg which did help her with sleep initially but later she felt she was waking up multiple times at night and was not getting a good night sleep. tried to cut back on the medication to 7.5 mg but unfortunately the lower dose did not help.  She is having an average 4 hours of sleep per day.  [facility] has recommended her to restart 15 mg mirtazapine which she has started yesterday.  Patient reports that she is very sleep deprived and lack of sleep is very debilitating for her causing tiredness headaches.  She is also worried that could her excessive vitamin levels be an etiology for her changes in movements which is a possibility with hypervitaminosis D and B12 but given she has stopped the supplements as well as her symptoms of insomnia  were present even before she started the supplements I think this is less likely the etiology of her insomnia    Patient also reports that the middle of the night when she wakes up she is startled and is having palpitations for which she was prescribed hydroxyzine which she did not tolerate.  Currently she is prescribed propranolol to be taken as needed.  She tried 10 mg of propranolol which did not help much.  Recommended to increase to 20 mg of propranolol as needed for palpitations and anxiety episodes.    Patient also reports having some spotting in between her menstrual cycles and was wondering if this is a side effect of mirtazapine.  Mirtazapine can cause it very rarely but given her clinical scenario possible etiology could be the ongoing stress and insomnia    Health Maintenance: Completed    ROS:  Review of Systems   Constitutional:  Positive for malaise/fatigue.   Psychiatric/Behavioral:  The patient has insomnia.      Please see HPI for additional ROS.      Objective:     Exam:  /70 (BP Location: Left arm, Patient Position: Sitting, BP Cuff Size: Adult)   Pulse 70   Temp 36.2 °C (97.2 °F) (Temporal)   Wt 57.1 kg (125 lb 14.1 oz)   LMP 01/28/2024   SpO2 99%   BMI 23.02 kg/m²  Body mass index is 23.02 kg/m².    Physical Exam  Constitutional:       Appearance: Normal appearance.      Comments: Appears tired    HENT:      Head: Normocephalic and atraumatic.      Nose: Nose normal.   Eyes:      Conjunctiva/sclera: Conjunctivae normal.   Cardiovascular:      Rate and Rhythm: Normal rate.   Neurological:      Mental Status: She is alert and oriented to person, place, and time. Mental status is at baseline.   Psychiatric:         Behavior: Behavior normal.             Labs: reviewed    Assessment & Plan:     38 y.o. female with the following -     1. Primary insomnia  Chronic, uncontrolled  Extensive counseling and education provided today.  Please review HPI for more details.  We discussed risk  benefits of trazodone as another option if mirtazapine is not helping with the sleep.  Information provided in after visit summary.  Patient is recommended to continue 15 mg of mirtazapine along with sleep hygiene and therapy.  If no improvement then can consider switching to trazodone.    2. Hypervitaminosis D  Excessive vitamin D levels.  Patient is recommended to discontinue vitamin D supplements.  Patient has already stopped taking vitamin D 1 month back.  We will reevaluate with vitamin daily labs in 3 months    3. Hypervitaminosis, B complex  Excessive vitamin B12 levels, this is due to excessive oral intake of B12 supplements.  Patient is recommended to discontinue B12 intake.  She has already stopped medication a month back.  Will continue to monitor.  Repeat B12 labs in 3 months    I spent a total of 36 minutes with record review, exam, communication with the patient, communication with other providers, and documentation of this encounter.      Return in about 3 months (around 5/7/2024), or if symptoms worsen or fail to improve.    Please note that this dictation was created using voice recognition software. I have made every reasonable attempt to correct obvious errors, but I expect that there are errors of grammar and possibly content that I did not discover before finalizing the note.

## 2024-02-08 ENCOUNTER — HOSPITAL ENCOUNTER (EMERGENCY)
Facility: MEDICAL CENTER | Age: 39
End: 2024-02-08
Attending: EMERGENCY MEDICINE
Payer: COMMERCIAL

## 2024-02-08 VITALS
HEART RATE: 75 BPM | SYSTOLIC BLOOD PRESSURE: 124 MMHG | BODY MASS INDEX: 23.31 KG/M2 | TEMPERATURE: 97.4 F | RESPIRATION RATE: 15 BRPM | OXYGEN SATURATION: 99 % | WEIGHT: 127.43 LBS | DIASTOLIC BLOOD PRESSURE: 86 MMHG

## 2024-02-08 DIAGNOSIS — Z91.89 AT RISK FOR POLYPHARMACY: ICD-10-CM

## 2024-02-08 DIAGNOSIS — R73.9 HYPERGLYCEMIA: ICD-10-CM

## 2024-02-08 DIAGNOSIS — F51.03 PARADOXICAL INSOMNIA: ICD-10-CM

## 2024-02-08 DIAGNOSIS — R00.2 PALPITATIONS: ICD-10-CM

## 2024-02-08 LAB
ALBUMIN SERPL BCP-MCNC: 4.7 G/DL (ref 3.2–4.9)
ALBUMIN/GLOB SERPL: 1.8 G/DL
ALP SERPL-CCNC: 60 U/L (ref 30–99)
ALT SERPL-CCNC: 14 U/L (ref 2–50)
ANION GAP SERPL CALC-SCNC: 11 MMOL/L (ref 7–16)
APPEARANCE UR: CLEAR
AST SERPL-CCNC: 14 U/L (ref 12–45)
BASOPHILS # BLD AUTO: 0.4 % (ref 0–1.8)
BASOPHILS # BLD: 0.03 K/UL (ref 0–0.12)
BILIRUB SERPL-MCNC: 0.5 MG/DL (ref 0.1–1.5)
BILIRUB UR QL STRIP.AUTO: NEGATIVE
BUN SERPL-MCNC: 17 MG/DL (ref 8–22)
CALCIUM ALBUM COR SERPL-MCNC: 8.6 MG/DL (ref 8.5–10.5)
CALCIUM SERPL-MCNC: 9.2 MG/DL (ref 8.5–10.5)
CHLORIDE SERPL-SCNC: 104 MMOL/L (ref 96–112)
CO2 SERPL-SCNC: 26 MMOL/L (ref 20–33)
COLOR UR: YELLOW
CREAT SERPL-MCNC: 0.62 MG/DL (ref 0.5–1.4)
EKG IMPRESSION: NORMAL
EOSINOPHIL # BLD AUTO: 0.03 K/UL (ref 0–0.51)
EOSINOPHIL NFR BLD: 0.4 % (ref 0–6.9)
ERYTHROCYTE [DISTWIDTH] IN BLOOD BY AUTOMATED COUNT: 43.9 FL (ref 35.9–50)
GFR SERPLBLD CREATININE-BSD FMLA CKD-EPI: 117 ML/MIN/1.73 M 2
GLOBULIN SER CALC-MCNC: 2.6 G/DL (ref 1.9–3.5)
GLUCOSE SERPL-MCNC: 132 MG/DL (ref 65–99)
GLUCOSE UR STRIP.AUTO-MCNC: NEGATIVE MG/DL
HCG UR QL: NEGATIVE
HCT VFR BLD AUTO: 43.9 % (ref 37–47)
HGB BLD-MCNC: 14.8 G/DL (ref 12–16)
IMM GRANULOCYTES # BLD AUTO: 0.01 K/UL (ref 0–0.11)
IMM GRANULOCYTES NFR BLD AUTO: 0.1 % (ref 0–0.9)
KETONES UR STRIP.AUTO-MCNC: NEGATIVE MG/DL
LEUKOCYTE ESTERASE UR QL STRIP.AUTO: NEGATIVE
LYMPHOCYTES # BLD AUTO: 1.93 K/UL (ref 1–4.8)
LYMPHOCYTES NFR BLD: 28.7 % (ref 22–41)
MAGNESIUM SERPL-MCNC: 2.5 MG/DL (ref 1.5–2.5)
MCH RBC QN AUTO: 30.9 PG (ref 27–33)
MCHC RBC AUTO-ENTMCNC: 33.7 G/DL (ref 32.2–35.5)
MCV RBC AUTO: 91.6 FL (ref 81.4–97.8)
MICRO URNS: NORMAL
MONOCYTES # BLD AUTO: 0.35 K/UL (ref 0–0.85)
MONOCYTES NFR BLD AUTO: 5.2 % (ref 0–13.4)
NEUTROPHILS # BLD AUTO: 4.37 K/UL (ref 1.82–7.42)
NEUTROPHILS NFR BLD: 65.2 % (ref 44–72)
NITRITE UR QL STRIP.AUTO: NEGATIVE
NRBC # BLD AUTO: 0 K/UL
NRBC BLD-RTO: 0 /100 WBC (ref 0–0.2)
PH UR STRIP.AUTO: 7 [PH] (ref 5–8)
PLATELET # BLD AUTO: 199 K/UL (ref 164–446)
PMV BLD AUTO: 11.4 FL (ref 9–12.9)
POTASSIUM SERPL-SCNC: 3.7 MMOL/L (ref 3.6–5.5)
PROT SERPL-MCNC: 7.3 G/DL (ref 6–8.2)
PROT UR QL STRIP: NEGATIVE MG/DL
RBC # BLD AUTO: 4.79 M/UL (ref 4.2–5.4)
RBC UR QL AUTO: NEGATIVE
SODIUM SERPL-SCNC: 141 MMOL/L (ref 135–145)
SP GR UR STRIP.AUTO: 1.01
TROPONIN T SERPL-MCNC: <6 NG/L (ref 6–19)
TSH SERPL DL<=0.005 MIU/L-ACNC: 0.92 UIU/ML (ref 0.38–5.33)
UROBILINOGEN UR STRIP.AUTO-MCNC: 0.2 MG/DL
WBC # BLD AUTO: 6.7 K/UL (ref 4.8–10.8)

## 2024-02-08 PROCEDURE — 84484 ASSAY OF TROPONIN QUANT: CPT

## 2024-02-08 PROCEDURE — 36415 COLL VENOUS BLD VENIPUNCTURE: CPT

## 2024-02-08 PROCEDURE — 99284 EMERGENCY DEPT VISIT MOD MDM: CPT

## 2024-02-08 PROCEDURE — 84443 ASSAY THYROID STIM HORMONE: CPT

## 2024-02-08 PROCEDURE — 85025 COMPLETE CBC W/AUTO DIFF WBC: CPT

## 2024-02-08 PROCEDURE — 83735 ASSAY OF MAGNESIUM: CPT

## 2024-02-08 PROCEDURE — 80053 COMPREHEN METABOLIC PANEL: CPT

## 2024-02-08 PROCEDURE — 81025 URINE PREGNANCY TEST: CPT

## 2024-02-08 PROCEDURE — 81003 URINALYSIS AUTO W/O SCOPE: CPT

## 2024-02-08 PROCEDURE — 93005 ELECTROCARDIOGRAM TRACING: CPT | Performed by: EMERGENCY MEDICINE

## 2024-02-08 ASSESSMENT — FIBROSIS 4 INDEX: FIB4 SCORE: 1.13

## 2024-02-09 NOTE — ED TRIAGE NOTES
"Cinthia Alba  38 y.o. female  Chief Complaint   Patient presents with    Anxiety     Patient was started on Remeron for sleep in January and stated it was working but recently she's had episodes of waking up in a panic. Patient has followed up with her wellness clinic and had the dose adjusted. Patient was prescribed propanolol for anxiety. Patient states she was pushing her son in the stroller today and suddenly felt \"out of my body\" and felt muscle cramps. Patient is concerned for serotonin syndrome. PAtient took an extra dose of Remeron at 4PM after the episode.        Vitals:    02/08/24 1842   BP: 135/81   Pulse: 76   Resp: 14   Temp: 36.3 °C (97.4 °F)   SpO2: 97%       Triage process explained to patient, apologized for wait time, and returned to [facility].  Pt informed to notify staff of any change in condition.     "

## 2024-02-09 NOTE — ED PROVIDER NOTES
"ED Provider Note    CHIEF COMPLAINT  Chief Complaint   Patient presents with    Anxiety     Patient was started on Remeron for sleep in January and stated it was working but recently she's had episodes of waking up in a panic. Patient has followed up with her wellness clinic and had the dose adjusted. Patient was prescribed propanolol for anxiety. Patient states she was pushing her son in the stroller today and suddenly felt \"out of my body\" and felt muscle cramps. Patient is concerned for serotonin syndrome. PAtient took an extra dose of Remeron at 4PM after the episode.        EXTERNAL RECORDS REVIEWED  Outpatient Notes reviewed office visit progress note dated yesterday by Dr. Olson.  Note is unfortunately not completed.  Remov relates patient seen for lab follow-up with elevated LDL of 107, TSH normal at 1.1.  On vitamin B12 and vitamin D.    HPI/ROS  LIMITATION TO HISTORY   Select: : None  OUTSIDE HISTORIAN(S):  Parent at bedside comfortable plan of care    Cinthia Alba is a 38 y.o. female who presents for evaluation of a \"out of body\" experience.  Patient notes this happened this afternoon when she was pushing her son in a stroller.  Patient relates she has been seeking treatment for anxiety and having worsening difficulty sleeping since December.  Patient relates she was started on Remeron which initially did seem to help her sleeping but notes she has been gradually waking up \"in a panic\" for the last few weeks.  Notes less and less sleep per evening as well.  She also relates she had been on Lexapro but this is since been discontinued.  Currently she is on no SSRIs but believes she spoke with her primary care who recommended considering starting sertraline, she is not currently on this.  Patient also takes Xanax for anxiety and propranolol for anxiety.  Relates a sensation of palpitations again more so in the evening when she wakes up; has no established history of heart disease.  No sensation of " muscle cramping but has had no convulsive activity.  No known fever.  No vomiting, no productive cough, no particular ill contacts.  No syncopal episodes.  Given the constellation of symptoms and given she has been on multiple medication with several recent changes she came to be assessed today.    PAST MEDICAL HISTORY   has a past medical history of Psychiatric problem.    SURGICAL HISTORY   has a past surgical history that includes foot surgery (Bilateral).    FAMILY HISTORY  Family History   Problem Relation Age of Onset    Psychiatric Illness Mother     Hypertension Father     Cancer Father 60        melanoma    No Known Problems Sister    No coronary artery disease in immediate family    SOCIAL HISTORY  Social History     Tobacco Use    Smoking status: Never    Smokeless tobacco: Never   Vaping Use    Vaping Use: Never used   Substance and Sexual Activity    Alcohol use: Not Currently    Drug use: No    Sexual activity: Yes     Partners: Male       CURRENT MEDICATIONS  Home Medications    **Home medications have not yet been reviewed for this encounter**         ALLERGIES  No Known Allergies    PHYSICAL EXAM  VITAL SIGNS: /81   Pulse 76   Temp 36.3 °C (97.4 °F) (Temporal)   Resp 14   Wt 57.8 kg (127 lb 6.8 oz)   LMP 01/28/2024   SpO2 97%   BMI 23.31 kg/m²    General: Alert, mo acute distress  Skin: Warm, dry, normal for ethnicity  Head: Normocephalic, atraumatic  Neck: Trachea midline  Eye: PERRL, extraocular movements intact without nystagmus.  ENMT: Oral mucosa moist, no pharyngeal erythema or exudate  Cardiovascular: Regular rate and rhythm, No murmur, Normal peripheral perfusion  Respiratory: Lungs CTA, respirations are non-labored, breath sounds are equal  Musculoskeletal: No swelling, no deformity  Neurological: Alert and oriented to person, place, time, and situation.  Cranial nerves II through XII are grossly intact.  Upper and lower extremity strength and sensation are 5 out of 5 and  symmetrical bilaterally.  She demonstrates no clonus or convulsions.  No upper nor lower extremity pronator drift.  Normal finger-nose without past-pointing or ataxia.  2+ symmetrical DTRs at the patellas.  Lymphatics: No lymphadenopathy  Psychiatric: Cooperative, mildly anxious with somewhat pressured speech, otherwise appropriate mood & affect     DIAGNOSTIC STUDIES / PROCEDURES  EKG  I have independently interpreted this EKG  EKG Interpretation    Interpreted by emergency department physician    Rhythm: normal sinus   Rate: 79  Axis: normal  Ectopy: none  Conduction: normal  ST Segments: no acute change  T Waves: no acute change  Q Waves: none    Clinical Impression: no acute changes, no Brugada, no long QT, no delta wave    LABS  Results for orders placed or performed during the hospital encounter of 02/08/24   CBC w/ Differential   Result Value Ref Range    WBC 6.7 4.8 - 10.8 K/uL    RBC 4.79 4.20 - 5.40 M/uL    Hemoglobin 14.8 12.0 - 16.0 g/dL    Hematocrit 43.9 37.0 - 47.0 %    MCV 91.6 81.4 - 97.8 fL    MCH 30.9 27.0 - 33.0 pg    MCHC 33.7 32.2 - 35.5 g/dL    RDW 43.9 35.9 - 50.0 fL    Platelet Count 199 164 - 446 K/uL    MPV 11.4 9.0 - 12.9 fL    Neutrophils-Polys 65.20 44.00 - 72.00 %    Lymphocytes 28.70 22.00 - 41.00 %    Monocytes 5.20 0.00 - 13.40 %    Eosinophils 0.40 0.00 - 6.90 %    Basophils 0.40 0.00 - 1.80 %    Immature Granulocytes 0.10 0.00 - 0.90 %    Nucleated RBC 0.00 0.00 - 0.20 /100 WBC    Neutrophils (Absolute) 4.37 1.82 - 7.42 K/uL    Lymphs (Absolute) 1.93 1.00 - 4.80 K/uL    Monos (Absolute) 0.35 0.00 - 0.85 K/uL    Eos (Absolute) 0.03 0.00 - 0.51 K/uL    Baso (Absolute) 0.03 0.00 - 0.12 K/uL    Immature Granulocytes (abs) 0.01 0.00 - 0.11 K/uL    NRBC (Absolute) 0.00 K/uL   Complete Metabolic Panel (CMP)   Result Value Ref Range    Sodium 141 135 - 145 mmol/L    Potassium 3.7 3.6 - 5.5 mmol/L    Chloride 104 96 - 112 mmol/L    Co2 26 20 - 33 mmol/L    Anion Gap 11.0 7.0 - 16.0     Glucose 132 (H) 65 - 99 mg/dL    Bun 17 8 - 22 mg/dL    Creatinine 0.62 0.50 - 1.40 mg/dL    Calcium 9.2 8.5 - 10.5 mg/dL    Correct Calcium 8.6 8.5 - 10.5 mg/dL    AST(SGOT) 14 12 - 45 U/L    ALT(SGPT) 14 2 - 50 U/L    Alkaline Phosphatase 60 30 - 99 U/L    Total Bilirubin 0.5 0.1 - 1.5 mg/dL    Albumin 4.7 3.2 - 4.9 g/dL    Total Protein 7.3 6.0 - 8.2 g/dL    Globulin 2.6 1.9 - 3.5 g/dL    A-G Ratio 1.8 g/dL   Troponin - STAT Once   Result Value Ref Range    Troponin T <6 6 - 19 ng/L   Magnesium   Result Value Ref Range    Magnesium 2.5 1.5 - 2.5 mg/dL   TSH (for screening thyroid dysfunction)   Result Value Ref Range    TSH 0.920 0.380 - 5.330 uIU/mL   URINALYSIS    Specimen: Urine   Result Value Ref Range    Color Yellow     Character Clear     Specific Gravity 1.008 <1.035    Ph 7.0 5.0 - 8.0    Glucose Negative Negative mg/dL    Ketones Negative Negative mg/dL    Protein Negative Negative mg/dL    Bilirubin Negative Negative    Urobilinogen, Urine 0.2 Negative    Nitrite Negative Negative    Leukocyte Esterase Negative Negative    Occult Blood Negative Negative    Micro Urine Req see below    BETA-HCG QUALITATIVE URINE   Result Value Ref Range    Beta-Hcg Urine Negative Negative   ESTIMATED GFR   Result Value Ref Range    GFR (CKD-EPI) 117 >60 mL/min/1.73 m 2   EKG   Result Value Ref Range    Report       [facility] Emergency Dept.    Test Date:  2024  Pt Name:    EMMETT HERRERA             Department: ER  MRN:        [id number]                      Room:       VT 73  Gender:     Female                       Technician: [id number]  :        1985                   Requested By:CHARLIE BRADSHAW  Order #:    [id number]                    Reading MD: CHARLIE BRADSHAW MD    Measurements  Intervals                                Axis  Rate:       79                           P:          72  VT:         143                          QRS:        39  QRSD:       85                            T:          40  QT:         407  QTc:        467    Interpretive Statements  Sinus rhythm  No previous ECG available for comparison  Electronically Signed On 02- 21:03:42 PST by CHARLIE BRADSHAW MD              COURSE & MEDICAL DECISION MAKING    ED Observation Status? Yes; I am placing the patient in to an observation status due to a diagnostic uncertainty as well as therapeutic intensity. Patient placed in observation status at 8:35 PM, 2/8/2024.     Observation plan is as follows: Metabolic/cardiac workup will be obtained.  Differential diagnosis at this point includes but is not restricted to hypothyroidism, anemia, anxiety, electrolyte disturbance, acute kidney injury, cardiac dysrhythmia, medication side effect, viral illness, UTI    2208: Patient reassessed, I have updated her with reassuring studies thus far.  She is resting comfortably in no acute distress.  Awaiting urinalysis at this time.    2225: Urine is reassuring, no evidence of UTI or proteinuria.  hCG negative.  Patient reassessed in no mar distress, reassured with regard to unremarkable studies.    Upon Reevaluation, the patient's condition has: Improved; and will be discharged.    Patient discharged from ED Observation status at 2235 (Time) 2/8/24 (Date).     INITIAL ASSESSMENT, COURSE AND PLAN  Care Narrative: Very pleasant 38-year-old female otherwise healthy presents for above chief complaint.  History and exam as above.  Reassuringly vital signs demonstrate no tachycardia, she is not hypoxic, she has clear lungs on exam and demonstrates no increased work of breathing.  EKG and troponin are well within normal limits, this is not consistent with acute coronary syndrome as such.  EKG intervals demonstrate no evidence of long QT syndrome.  PERC criteria discussed below, presentation thusly not consistent with clinically significant pulmonary embolism.  Other than mild hyperglycemia in the context of the nonfasting  chemistry panel patient's labs are quite reassuring with no evidence of hyperthyroidism or sepsis.  She has no marked electrolyte abnormality.  Given no fever, no clonus, no hyperreflexia, no tremulousness, this would not be consistent with serotonin syndrome.  She is only on a single medication that would increase her serotonin that being Remeron which is a tricyclic antidepressant.  At this point there is no indication for inpatient management but given the patient's polypharmacy and multiple medication adjustments and changes I do feel she will need to sit down with her prescribing provider and have a long thorough discussion with regard to indication of pharmacologic management and potential side effects.  Patient amenable to outpatient management/follow-up.  HTN/IDDM FOLLOW UP:  The patient is referred to a primary physician for blood pressure management, diabetic screening, and for all other preventive health concerns      ADDITIONAL PROBLEM LIST  Anxiety, palpitations, difficulty sleeping  DISPOSITION AND DISCUSSIONS  I have discussed management of the patient with the following physicians and UVALDO's:  NA    Discussion of management with other Q or appropriate source(s): None     Escalation of care considered, and ultimately not performed:acute inpatient care management, however at this time, the patient is most appropriate for outpatient management    Barriers to care at this time, including but not limited to:  NA .     Decision tools and prescription drugs considered including, but not limited to: PERC rule given no unilateral limb swelling, no hemoptysis, no tachycardia, no tachypnea, no hypoxia, no history of thromboembolic disease; this is not consistent with clinically significant pulmonary embolism per PERC criteria, HEART Score 0, and SIRS criteria for sepsis not met .    FINAL DIAGNOSIS  1. Palpitations    2. Paradoxical insomnia    3. Hyperglycemia    4. At risk for polypharmacy            Electronically signed by: Bora Magdaleno M.D., 2/8/2024 8:17 PM

## 2024-02-09 NOTE — ED NOTES
Discharge teaching with paperwork provided to pt. Pt verbalized understanding of teaching and all questions answered. Pt is A&Ox4 with stable vital signs, stable physical assessment, and PIV removed with catheter intact upon discharge. Pt ambulatory out of ED with steady gait with all personal belongings.

## 2024-02-11 ASSESSMENT — ENCOUNTER SYMPTOMS: INSOMNIA: 1

## 2024-02-13 ENCOUNTER — OFFICE VISIT (OUTPATIENT)
Dept: MEDICAL GROUP | Facility: MEDICAL CENTER | Age: 39
End: 2024-02-13
Payer: COMMERCIAL

## 2024-02-13 VITALS
DIASTOLIC BLOOD PRESSURE: 70 MMHG | HEART RATE: 87 BPM | HEIGHT: 62 IN | TEMPERATURE: 97.3 F | OXYGEN SATURATION: 97 % | BODY MASS INDEX: 23.96 KG/M2 | SYSTOLIC BLOOD PRESSURE: 122 MMHG | WEIGHT: 130.21 LBS

## 2024-02-13 DIAGNOSIS — F51.02 ADJUSTMENT INSOMNIA: ICD-10-CM

## 2024-02-13 PROCEDURE — 99214 OFFICE O/P EST MOD 30 MIN: CPT | Performed by: STUDENT IN AN ORGANIZED HEALTH CARE EDUCATION/TRAINING PROGRAM

## 2024-02-13 PROCEDURE — 3074F SYST BP LT 130 MM HG: CPT | Performed by: STUDENT IN AN ORGANIZED HEALTH CARE EDUCATION/TRAINING PROGRAM

## 2024-02-13 PROCEDURE — 3078F DIAST BP <80 MM HG: CPT | Performed by: STUDENT IN AN ORGANIZED HEALTH CARE EDUCATION/TRAINING PROGRAM

## 2024-02-13 RX ORDER — PROPRANOLOL HYDROCHLORIDE 10 MG/1
TABLET ORAL
COMMUNITY
Start: 2024-02-01

## 2024-02-13 RX ORDER — SERTRALINE HYDROCHLORIDE 25 MG/1
TABLET, FILM COATED ORAL
COMMUNITY
Start: 2024-02-08

## 2024-02-13 RX ORDER — TEMAZEPAM 7.5 MG/1
7.5 CAPSULE ORAL NIGHTLY PRN
Qty: 30 CAPSULE | Refills: 0 | Status: SHIPPED | OUTPATIENT
Start: 2024-02-13 | End: 2024-03-14

## 2024-02-13 ASSESSMENT — ENCOUNTER SYMPTOMS
NERVOUS/ANXIOUS: 1
INSOMNIA: 1

## 2024-02-13 ASSESSMENT — FIBROSIS 4 INDEX: FIB4 SCORE: 0.7

## 2024-02-13 NOTE — PROGRESS NOTES
"Subjective:     CC:   Chief Complaint   Patient presents with    Insomnia     Follow up, requesting a second opinion from [facility]          HPI:   Cinthia presents today to Discuss her ongoing insomnia  Patient was seen recently 1 week back for the same concern.  She was following with [facility] and was getting Remeron prescription.  Her sleep did not improve with this medication and she was also having multiple awakenings at night with palpitations and anxiety episodes.  She has also tried Lexapro and as needed Xanax as well as as needed propranolol without much help.  Now patient is trying to wean off on on her medications because she is concerned that she has been taking too many medications.  Most likely her sleep concern started with interrupted sleeping with the  tried.  She also reports that the kid has recurring ear concerns which makes him wake up multiple times which also disrupts her sleep.  And this is causing her having anxiety.    She is weaning off herself from mirtazapine and has stopped it yesterday.  She has tried Xanax with limited benefits in the past and is wondering if she can try short duration of longer acting benzo like a temazepam just to help her get into making changes in her lifestyle and sleep hygiene.  She is also agreeable and is requesting to see a psychiatrist to discuss this further.      Health Maintenance: Completed    ROS:  Review of Systems   Constitutional:  Positive for malaise/fatigue.   Psychiatric/Behavioral:  The patient is nervous/anxious and has insomnia.        Review of systems unremarkable except for concerns noted by patient or items listed.    Please see HPI for additional ROS.      Objective:     Exam:  /70 (BP Location: Left arm, Patient Position: Sitting, BP Cuff Size: Adult)   Pulse 87   Temp 36.3 °C (97.3 °F) (Temporal)   Ht 1.575 m (5' 2\")   Wt 59.1 kg (130 lb 3.4 oz)   LMP 2024   SpO2 97%   Breastfeeding No   BMI 23.82 kg/m²  " "Body mass index is 23.82 kg/m².    Physical Exam  Constitutional:       Appearance: Normal appearance.   HENT:      Head: Normocephalic and atraumatic.      Nose: Nose normal.   Eyes:      Conjunctiva/sclera: Conjunctivae normal.   Cardiovascular:      Rate and Rhythm: Normal rate.   Neurological:      Mental Status: She is alert and oriented to person, place, and time. Mental status is at baseline.   Psychiatric:         Mood and Affect: Mood normal.         Behavior: Behavior normal.             Labs: reviewed    Assessment & Plan:     38 y.o. female with the following -       1. Adjustment insomnia  Chronic, uncontrolled     Plan:  Controlled substance discussed with client. Client agrees to abide by controlled substance contract.  Controlled substance use informed consent and controlled substance treatment agreement signed and scanned into patient's chart  PDMP shows no abnormal prescribing or filling behavior.    Controlled substance agreement up-to-date.    risk benefits and side effects of this medication discussed     -Controlled substance discussed with client. Client agrees to abide by controlled substance contract.   -A Risk of Abuse assessment for controlled abuse was previously preformed and documented in the past medical history.   The treatment plan was reviewed and discussed with the patient. The pharmacy monitoring report was requested and reviewed.  Patient is appropriate for refill of this medication.  -Patient informed no medication adjustments will be made to titrate up or add additional narcotics, benzodiazepines or other controlled substances to this regimen.  Referral to pain management or behavioral health will be made as appropriate.     Patient understands this prescription is a controlled substance which is potentially habit-forming and its use is regulated by the [place]. We also discussed the new \"black box\" warning regarding the lethal combination of opioids and benzodiazepines. Refills " are subject to terms of a controlled substance agreement and patient has an updated one on file. Most recent UDS is appropriate. Any refill requires an office visit. Narcotics, benzodiazepines, stimulants, and sleep aids may have adverse effects and the risks of addiction, accidental overdose and death were emphasized. Provided prescriptions for the next three months.       - Controlled Substance Treatment Agreement  - temazepam (RESTORIL) 7.5 MG capsule; Take 1 Capsule by mouth at bedtime as needed for Sleep for up to 30 days.  Dispense: 30 Capsule; Refill: 0  - Referral to Psychiatry    I spent a total of 35 minutes with record review, exam, communication with the patient, communication with other providers, and documentation of this encounter.      Return in about 3 months (around 5/13/2024), or if symptoms worsen or fail to improve.    Please note that this dictation was created using voice recognition software. I have made every reasonable attempt to correct obvious errors, but I expect that there are errors of grammar and possibly content that I did not discover before finalizing the note.

## 2024-02-21 ENCOUNTER — TELEPHONE (OUTPATIENT)
Dept: MEDICAL GROUP | Facility: MEDICAL CENTER | Age: 39
End: 2024-02-21
Payer: COMMERCIAL

## 2024-02-21 DIAGNOSIS — F51.01 PRIMARY INSOMNIA: ICD-10-CM

## 2024-02-22 NOTE — TELEPHONE ENCOUNTER
1. Caller Name: Cinthia Alba                        Call Back Number: [phone] (home) [phone] (work)      How would the patient prefer to be contacted with a response: Phone call do NOT leave a detailed message    2. SPECIFIC Action To Be Taken: Referral pending, please sign.    3. Diagnosis/Clinical Reason for Request: Sleep problem    4. Specialty & Provider Name/Lab/Imaging Location: [facility]     5. Is appointment scheduled for requested order/referral: no    Patient was informed they will receive a return phone call from the office ONLY if there are any questions before processing their request. Advised to call back if they haven't received a call from the referral department in 5 days.

## 2024-03-15 ENCOUNTER — HOSPITAL ENCOUNTER (OUTPATIENT)
Dept: LAB | Facility: MEDICAL CENTER | Age: 39
End: 2024-03-15
Attending: NURSE PRACTITIONER
Payer: COMMERCIAL

## 2024-03-15 LAB
FERRITIN SERPL-MCNC: 51.6 NG/ML (ref 10–291)
IRON SATN MFR SERPL: 38 % (ref 15–55)
IRON SERPL-MCNC: 117 UG/DL (ref 40–170)
TIBC SERPL-MCNC: 312 UG/DL (ref 250–450)
UIBC SERPL-MCNC: 195 UG/DL (ref 110–370)

## 2024-03-15 PROCEDURE — 36415 COLL VENOUS BLD VENIPUNCTURE: CPT

## 2024-03-15 PROCEDURE — 82728 ASSAY OF FERRITIN: CPT

## 2024-03-15 PROCEDURE — 83540 ASSAY OF IRON: CPT

## 2024-03-15 PROCEDURE — 83550 IRON BINDING TEST: CPT

## 2024-04-10 ENCOUNTER — OFFICE VISIT (OUTPATIENT)
Dept: URGENT CARE | Facility: PHYSICIAN GROUP | Age: 39
End: 2024-04-10
Payer: COMMERCIAL

## 2024-04-10 VITALS
WEIGHT: 124.12 LBS | BODY MASS INDEX: 22.84 KG/M2 | RESPIRATION RATE: 16 BRPM | OXYGEN SATURATION: 95 % | HEART RATE: 88 BPM | HEIGHT: 62 IN | TEMPERATURE: 97.9 F | DIASTOLIC BLOOD PRESSURE: 66 MMHG | SYSTOLIC BLOOD PRESSURE: 108 MMHG

## 2024-04-10 DIAGNOSIS — K64.9 HEMORRHOIDS, UNSPECIFIED HEMORRHOID TYPE: ICD-10-CM

## 2024-04-10 DIAGNOSIS — J22 LOWER RESPIRATORY TRACT INFECTION: ICD-10-CM

## 2024-04-10 DIAGNOSIS — R11.0 NAUSEA: ICD-10-CM

## 2024-04-10 PROCEDURE — 3074F SYST BP LT 130 MM HG: CPT

## 2024-04-10 PROCEDURE — 99214 OFFICE O/P EST MOD 30 MIN: CPT

## 2024-04-10 PROCEDURE — 3078F DIAST BP <80 MM HG: CPT

## 2024-04-10 RX ORDER — AMOXICILLIN AND CLAVULANATE POTASSIUM 875; 125 MG/1; MG/1
1 TABLET, FILM COATED ORAL 2 TIMES DAILY
Qty: 10 TABLET | Refills: 0 | Status: SHIPPED | OUTPATIENT
Start: 2024-04-10 | End: 2024-04-15

## 2024-04-10 RX ORDER — PROCHLORPERAZINE MALEATE 5 MG/1
5 TABLET ORAL EVERY 6 HOURS PRN
Qty: 20 TABLET | Refills: 0 | Status: SHIPPED | OUTPATIENT
Start: 2024-04-10 | End: 2024-04-10

## 2024-04-10 RX ORDER — BENZONATATE 100 MG/1
100 CAPSULE ORAL 3 TIMES DAILY PRN
Qty: 20 CAPSULE | Refills: 0 | Status: SHIPPED | OUTPATIENT
Start: 2024-04-10

## 2024-04-10 RX ORDER — HYDROCORTISONE ACETATE 25 MG/1
25 SUPPOSITORY RECTAL EVERY 12 HOURS
Qty: 10 SUPPOSITORY | Refills: 0 | Status: SHIPPED | OUTPATIENT
Start: 2024-04-10

## 2024-04-10 RX ORDER — PYRIDOXINE HCL (VITAMIN B6) 25 MG
25 TABLET ORAL 3 TIMES DAILY PRN
Qty: 30 TABLET | Refills: 1 | Status: SHIPPED | OUTPATIENT
Start: 2024-04-10

## 2024-04-10 ASSESSMENT — ENCOUNTER SYMPTOMS
COUGH: 1
ABDOMINAL PAIN: 0
FEVER: 0
BLOOD IN STOOL: 0
CHILLS: 0
SORE THROAT: 1
NAUSEA: 1
SINUS PAIN: 0
SHORTNESS OF BREATH: 1
MYALGIAS: 0
VOMITING: 0

## 2024-04-10 ASSESSMENT — FIBROSIS 4 INDEX: FIB4 SCORE: 0.7

## 2024-04-10 NOTE — PROGRESS NOTES
Chief Complaint   Patient presents with    Wheezing     Chest congestion, tightness in chest, wheezing or a crackling sound when relaxing, heavy breathing, sore throat, pressure in the ears, a lot of mucus coughing up, sx started about a week and half ago,        HISTORY OF PRESENT ILLNESS: Patient is a pleasant 38 y.o. female who presents to urgent care today ongoing cough with wheezing for the last week and a half.  Patient has not been taking anything.    Secondary complaint of hemorrhoids, she denies any blood in her stool, she does have nausea however she recently started some medications for anxiety and believes this may be a potential contributing cause.    Nausea, patient recently started on fluoxetine, notes ongoing nausea ever since.    Patient Active Problem List    Diagnosis Date Noted    Postpartum hemorrhage 10/31/2022    Anemia associated with acute blood loss 10/31/2022    Labor and delivery indication for care or intervention 10/28/2022    Depression with anxiety 09/27/2018    Pure hypercholesterolemia 09/11/2017    Bruxism (teeth grinding) 02/09/2017    Hx of abnormal cervical Pap smear 02/09/2017       Allergies:Patient has no known allergies.    Current Outpatient Medications Ordered in Epic   Medication Sig Dispense Refill    amoxicillin-clavulanate (AUGMENTIN) 875-125 MG Tab Take 1 Tablet by mouth 2 times a day for 5 days. 10 Tablet 0    benzonatate (TESSALON) 100 MG Cap Take 1 Capsule by mouth 3 times a day as needed for Cough. 20 Capsule 0    hydrocortisone (ANUSOL-HC) 25 MG Suppos Insert 1 Suppository into the rectum every 12 hours. 10 Suppository 0    pyridoxine (VITAMIN B-6) 25 MG Tab Take 1 Tablet by mouth 3 times a day as needed (nausea and vomiting). 30 Tablet 1    propranolol (INDERAL) 10 MG Tab       sertraline (ZOLOFT) 25 MG tablet       mirtazapine (REMERON) 15 MG Tab       acetaminophen (TYLENOL) 500 MG Tab Take 1-2 Tablets by mouth every 6 hours as needed for Moderate Pain or Mild  "Pain. 30 Tablet 0     No current Epic-ordered facility-administered medications on file.       Past Medical History:   Diagnosis Date    Psychiatric problem        Social History     Tobacco Use    Smoking status: Never    Smokeless tobacco: Never   Vaping Use    Vaping Use: Never used   Substance Use Topics    Alcohol use: Not Currently    Drug use: No       Family Status   Relation Name Status    Mo  Alive    Fa  Alive    Sis  Alive     Family History   Problem Relation Age of Onset    Psychiatric Illness Mother     Hypertension Father     Cancer Father 60        melanoma    No Known Problems Sister        Review of Systems   Constitutional:  Positive for malaise/fatigue. Negative for chills and fever.   HENT:  Positive for congestion and sore throat. Negative for sinus pain.    Respiratory:  Positive for cough and shortness of breath.    Gastrointestinal:  Positive for nausea. Negative for abdominal pain, blood in stool and vomiting.   Musculoskeletal:  Negative for myalgias.       Exam:  /66 (BP Location: Right arm, Patient Position: Sitting, BP Cuff Size: Adult long)   Pulse 88   Temp 36.6 °C (97.9 °F) (Temporal)   Resp 16   Ht 1.575 m (5' 2\")   Wt 56.3 kg (124 lb 1.9 oz)   SpO2 95%   Physical Exam  Vitals reviewed.   Constitutional:       Appearance: Normal appearance. She is normal weight. She is not toxic-appearing.   HENT:      Head: Normocephalic.      Right Ear: Tympanic membrane, ear canal and external ear normal. There is no impacted cerumen.      Left Ear: Tympanic membrane, ear canal and external ear normal. There is no impacted cerumen.      Nose: Congestion present. No nasal tenderness, mucosal edema or rhinorrhea.      Mouth/Throat:      Mouth: Mucous membranes are moist.      Pharynx: Posterior oropharyngeal erythema present. No oropharyngeal exudate.      Tonsils: No tonsillar exudate. 1+ on the right. 1+ on the left.   Eyes:      General:         Right eye: No discharge.         Left " eye: No discharge.      Extraocular Movements: Extraocular movements intact.      Conjunctiva/sclera: Conjunctivae normal.      Pupils: Pupils are equal, round, and reactive to light.   Cardiovascular:      Rate and Rhythm: Normal rate and regular rhythm.      Pulses: Normal pulses.      Heart sounds: Normal heart sounds. No murmur heard.  Pulmonary:      Effort: Pulmonary effort is normal. No respiratory distress.      Breath sounds: No stridor. Wheezing and rhonchi present. No rales.      Comments: Positive congested cough  Musculoskeletal:         General: No swelling, tenderness, deformity or signs of injury.      Cervical back: Normal range of motion and neck supple. No tenderness.      Right lower leg: No edema.      Left lower leg: No edema.   Skin:     General: Skin is warm and dry.      Capillary Refill: Capillary refill takes less than 2 seconds.      Findings: No bruising, erythema, lesion or rash.   Neurological:      General: No focal deficit present.      Mental Status: She is alert.      Sensory: No sensory deficit.      Motor: No weakness.      Coordination: Coordination normal.      Gait: Gait normal.   Psychiatric:         Mood and Affect: Mood normal.         Behavior: Behavior normal.         Thought Content: Thought content normal.         Judgment: Judgment normal.         Assessment/Plan:  1. Lower respiratory tract infection  - amoxicillin-clavulanate (AUGMENTIN) 875-125 MG Tab; Take 1 Tablet by mouth 2 times a day for 5 days.  Dispense: 10 Tablet; Refill: 0  - benzonatate (TESSALON) 100 MG Cap; Take 1 Capsule by mouth 3 times a day as needed for Cough.  Dispense: 20 Capsule; Refill: 0    2. Hemorrhoids, unspecified hemorrhoid type  - hydrocortisone (ANUSOL-HC) 25 MG Suppos; Insert 1 Suppository into the rectum every 12 hours.  Dispense: 10 Suppository; Refill: 0    3. Nausea  - pyridoxine (VITAMIN B-6) 25 MG Tab; Take 1 Tablet by mouth 3 times a day as needed (nausea and vomiting).   Dispense: 30 Tablet; Refill: 1    Based on physical exam along with review of systems we will go ahead and start her on Augmentin as well as Tessalon for her cough.  Patient has notable wheezes and rhonchi.  Positive congested cough.  She has been sick for well over a week and a half with no relief.    Patient has noted hemorrhoids, hydrocortisone suppositories sent to the pharmacy, reviewed care measures to include increasing exercise as well as fluids.    Ongoing nausea related to fluoxetine administration.  Advised that fluoxetine works on the gut, adverse symptom is often nausea especially during the first several weeks of administration however this should improve with time.  Patient is currently on Zofran, and looking at Compazine this is adversely mixing with her Seroquel that she is currently on therefore only vitamin B6 was prescribed.    Patient is aware of the plan and agreeable at this time. Total time spent with the patient 35 minutes to include, review of chart, charting, assessment, procedure.    Supportive care, differential diagnoses, and indications for immediate follow-up discussed with patient.   Pathogenesis of diagnosis discussed including typical length and natural progression.   Instructed to return to clinic or nearest emergency department for any change in condition, further concerns, or worsening of symptoms.  Patient states understanding of the plan of care and discharge instructions.  Instructed to make an appointment, for follow up, with primary care provider.    Please note that this dictation was created using voice recognition software. I have made every reasonable attempt to correct obvious errors, but I expect that there are errors of grammar and possibly content that I did not discover before finalizing the note.      Mojgan DOUGHERTY

## 2024-04-26 ENCOUNTER — OFFICE VISIT (OUTPATIENT)
Dept: URGENT CARE | Facility: PHYSICIAN GROUP | Age: 39
End: 2024-04-26
Payer: COMMERCIAL

## 2024-04-26 VITALS
BODY MASS INDEX: 22.82 KG/M2 | HEIGHT: 62 IN | TEMPERATURE: 98.8 F | WEIGHT: 124 LBS | DIASTOLIC BLOOD PRESSURE: 74 MMHG | SYSTOLIC BLOOD PRESSURE: 110 MMHG | RESPIRATION RATE: 18 BRPM | HEART RATE: 107 BPM | OXYGEN SATURATION: 98 %

## 2024-04-26 DIAGNOSIS — H66.002 NON-RECURRENT ACUTE SUPPURATIVE OTITIS MEDIA OF LEFT EAR WITHOUT SPONTANEOUS RUPTURE OF TYMPANIC MEMBRANE: ICD-10-CM

## 2024-04-26 DIAGNOSIS — R06.2 WHEEZING: ICD-10-CM

## 2024-04-26 DIAGNOSIS — J40 BRONCHITIS: ICD-10-CM

## 2024-04-26 RX ORDER — QUETIAPINE FUMARATE 50 MG/1
50 TABLET, FILM COATED ORAL 2 TIMES DAILY
COMMUNITY
Start: 2024-04-16

## 2024-04-26 RX ORDER — DOXYCYCLINE HYCLATE 100 MG
100 TABLET ORAL 2 TIMES DAILY
Qty: 14 TABLET | Refills: 0 | Status: SHIPPED | OUTPATIENT
Start: 2024-04-26 | End: 2024-05-03

## 2024-04-26 RX ORDER — LORAZEPAM 0.5 MG/1
0.5 TABLET ORAL EVERY 4 HOURS PRN
COMMUNITY
Start: 2024-04-24

## 2024-04-26 RX ORDER — FLUOXETINE HYDROCHLORIDE 20 MG/1
30 CAPSULE ORAL DAILY
COMMUNITY
Start: 2024-04-08

## 2024-04-26 RX ORDER — METHYLPREDNISOLONE 4 MG/1
TABLET ORAL
Qty: 21 TABLET | Refills: 0 | Status: SHIPPED | OUTPATIENT
Start: 2024-04-26

## 2024-04-26 RX ORDER — ONDANSETRON 4 MG/1
4 TABLET, ORALLY DISINTEGRATING ORAL EVERY 6 HOURS PRN
COMMUNITY
Start: 2024-04-05

## 2024-04-26 RX ORDER — ALBUTEROL SULFATE 90 UG/1
2 AEROSOL, METERED RESPIRATORY (INHALATION) EVERY 6 HOURS PRN
Qty: 8.5 G | Refills: 0 | Status: SHIPPED | OUTPATIENT
Start: 2024-04-26

## 2024-04-26 RX ORDER — ALBUTEROL SULFATE 2.5 MG/3ML
2.5 SOLUTION RESPIRATORY (INHALATION) ONCE
Status: COMPLETED | OUTPATIENT
Start: 2024-04-26 | End: 2024-04-26

## 2024-04-26 RX ADMIN — ALBUTEROL SULFATE 2.5 MG: 2.5 SOLUTION RESPIRATORY (INHALATION) at 10:58

## 2024-04-26 ASSESSMENT — FIBROSIS 4 INDEX: FIB4 SCORE: 0.7

## 2024-04-26 NOTE — PROGRESS NOTES
"Subjective:   Cinthia Alba is a 38 y.o. female who presents for Cough (cough; congestion; bodyaches did not resolve from 4/10/24 visit  )      HPI  The patient presents to the Urgent Care with complaints of a cough for almost a month.  She was evaluated in the urgent care 16 days ago and diagnosed lower respiratory tract infection and so she was placed on Augmentin and given a prescription of Tessalon Perles.  Patient states the medications are not helping at all.  Her symptoms have worsened.  Reports of chest congestion, chest tightness, chest pain.  Her cough is productive with green thick mucus.  Some head pressure and ear discomfort.  Some shortness of breath, body aches, and chills.  Denies any fever, vomiting, diarrhea. No history of significant allergies. No history of asthma.     Past Medical History:   Diagnosis Date    Psychiatric problem      No Known Allergies     Objective:     /74   Pulse (!) 107   Temp 37.1 °C (98.8 °F) (Temporal)   Resp 18   Ht 1.575 m (5' 2\")   Wt 56.2 kg (124 lb)   SpO2 95%   BMI 22.68 kg/m²     Physical Exam  Vitals reviewed.   Constitutional:       General: She is not in acute distress.     Appearance: Normal appearance. She is not ill-appearing or toxic-appearing.   HENT:      Right Ear: Tympanic membrane, ear canal and external ear normal.      Left Ear: Ear canal and external ear normal. A middle ear effusion (purulent) is present. Tympanic membrane is erythematous. Tympanic membrane is not perforated or bulging.      Mouth/Throat:      Mouth: Mucous membranes are moist.      Pharynx: Oropharynx is clear. Uvula midline. Posterior oropharyngeal erythema (trace) present. No pharyngeal swelling, oropharyngeal exudate or uvula swelling.      Tonsils: No tonsillar exudate or tonsillar abscesses.   Eyes:      Conjunctiva/sclera: Conjunctivae normal.   Cardiovascular:      Rate and Rhythm: Normal rate and regular rhythm.      Heart sounds: Normal heart sounds. "   Pulmonary:      Effort: Pulmonary effort is normal. No respiratory distress.      Breath sounds: Wheezing (mild expiratory upper lung fields) present. No rhonchi or rales.   Musculoskeletal:      Cervical back: Neck supple. No rigidity.   Skin:     General: Skin is warm and dry.   Neurological:      General: No focal deficit present.      Mental Status: She is alert and oriented to person, place, and time.   Psychiatric:         Mood and Affect: Mood normal.         Behavior: Behavior normal.         Diagnosis and associated orders:     1. Bronchitis  - albuterol (Proventil) 2.5mg/3ml nebulizer solution 2.5 mg  - methylPREDNISolone (MEDROL DOSEPAK) 4 MG Tablet Therapy Pack; Follow schedule on package instructions.  Dispense: 21 Tablet; Refill: 0    2. Wheezing  - albuterol (Proventil) 2.5mg/3ml nebulizer solution 2.5 mg  - methylPREDNISolone (MEDROL DOSEPAK) 4 MG Tablet Therapy Pack; Follow schedule on package instructions.  Dispense: 21 Tablet; Refill: 0  - albuterol 108 (90 Base) MCG/ACT Aero Soln inhalation aerosol; Inhale 2 Puffs every 6 hours as needed for Shortness of Breath.  Dispense: 8.5 g; Refill: 0    3. Non-recurrent acute suppurative otitis media of left ear without spontaneous rupture of tympanic membrane  - doxycycline (VIBRAMYCIN) 100 MG Tab; Take 1 Tablet by mouth 2 times a day for 7 days.  Dispense: 14 Tablet; Refill: 0       Comments/MDM:     This is a pleasant 38-year-old female who presents to the urgent care with complaints of a cough for almost a month.  Associated chest congestion, chest tightness, and shortness of breath.  See full history above.  On exam, she does have mild expiratory wheezes to upper lung fields.  No rales.  Signs of left otitis media.  No other significant findings on exam.  Vital signs are reassuring.  She appears to be in no acute distress.  Agreed to further treat with albuterol nebulizer here in the clinic.  Patient's wheezing somewhat improved.  Reexamination of  lungs showed no wheezes, rhonchi, or rales.  Patient developed albuterol side effects such as feeling jitteriness and shaky from albuterol.  Repeat SpO2 98% RA.  Reassured patient this is a side effect of albuterol and is short-lived.  Treatment plan of albuterol inhaler every 4-6 hours as needed for wheezing, shortness of breath, or coughing attacks.  Start Medrol Dosepak.  Start doxycycline.  Continue OTC cough medication, nasal saline washes, Flonase nasal spray.       I personally reviewed prior external notes and test results pertinent to today's visit. Pathogenesis of diagnosis discussed including typical length and natural progression. Supportive care, natural history, differential diagnoses, and indications for immediate follow-up discussed. Patient expresses understanding and agrees to plan. Patient denies any other questions or concerns.     Follow-up with the primary care physician for recheck, reevaluation, and consideration of further management.    Time spent evaluating the patient was 32 minutes which included preparing for the visit, obtaining history, examination, ordering labs/tests/procedures/medications, independent interpretation, discussion of plan, counseling/education, and documentation into chart. =    Please note that this dictation was created using voice recognition software. I have made a reasonable attempt to correct obvious errors, but I expect that there are errors of grammar and possibly content that I did not discover before finalizing the note.    This note was electronically signed by Mo Babb PA-C

## 2024-05-10 ENCOUNTER — HOSPITAL ENCOUNTER (OUTPATIENT)
Dept: LAB | Facility: MEDICAL CENTER | Age: 39
End: 2024-05-10
Attending: OBSTETRICS & GYNECOLOGY
Payer: COMMERCIAL

## 2024-05-10 LAB
ESTRADIOL SERPL-MCNC: 202 PG/ML
FSH SERPL-ACNC: 10 MIU/ML
PROGEST SERPL-MCNC: 0.75 NG/ML

## 2024-05-16 ENCOUNTER — NON-PROVIDER VISIT (OUTPATIENT)
Dept: OCCUPATIONAL MEDICINE | Facility: CLINIC | Age: 39
End: 2024-05-16

## 2024-05-16 DIAGNOSIS — Z11.1 ENCOUNTER FOR PPD TEST: Primary | ICD-10-CM

## 2024-05-16 LAB — TESTOST FREE SERPL-MCNC: 6.1 PG/ML (ref 1.3–9.2)

## 2024-05-16 PROCEDURE — 86580 TB INTRADERMAL TEST: CPT | Performed by: NURSE PRACTITIONER

## 2024-05-18 ENCOUNTER — NON-PROVIDER VISIT (OUTPATIENT)
Dept: URGENT CARE | Facility: CLINIC | Age: 39
End: 2024-05-18
Payer: COMMERCIAL

## 2024-05-18 LAB — TB WHEAL 3D P 5 TU DIAM: NORMAL MM

## 2024-05-18 NOTE — PROGRESS NOTES
Cinthia Alba is a 38 y.o. female here for a non-provider visit for PPD reading -- Step 1 of 1.      1.  Resulted in Epic under enter/edit results? Yes   2.  TB evaluation questionnaire scanned into chart and original given to patient?Yes      3. Was induration greater than 0 mm? No.        Routed to PCP? No

## 2024-05-21 ENCOUNTER — HOSPITAL ENCOUNTER (OUTPATIENT)
Dept: LAB | Facility: MEDICAL CENTER | Age: 39
End: 2024-05-21
Attending: OBSTETRICS & GYNECOLOGY
Payer: COMMERCIAL

## 2024-05-21 LAB
ESTRADIOL SERPL-MCNC: 22 PG/ML
FSH SERPL-ACNC: 8.2 MIU/ML
PROGEST SERPL-MCNC: 0.7 NG/ML

## 2024-05-26 LAB — TESTOST FREE SERPL-MCNC: 3.4 PG/ML (ref 1.3–9.2)

## 2024-11-10 NOTE — ANESTHESIA PROCEDURE NOTES
2 Hope Charge RN unable to take HOPs call at this time.    Epidural Block  Date/Time: 3/7/2020 1:46 PM  Performed by: Tigre Thapa M.D.  Authorized by: Tigre Thapa M.D.     Patient Location:  [facility]  Start Time:  3/7/2020 1:46 PM  End Time:  3/7/2020 4:51 PM  Reason for Block: labor analgesia    patient identified, IV checked, site marked, risks and benefits discussed, surgical consent, monitors and equipment checked, pre-op evaluation and timeout performed    Patient Position:  Sitting  Prep: ChloraPrep, patient draped and sterile technique    Monitoring:  Blood pressure, continuous pulse oximetry and heart rate  Approach:  Midline  Location:  L3-L4  Injection Technique:  YESSICA saline  Skin infiltration:  Lidocaine  Strength:  1%  Dose:  1ml  Needle Type:  Tuohy  Needle Gauge:  17 G  Needle Length:  3.5 in  Loss of resistance::  4  Catheter Size:  19 G  Catheter at Skin Depth:  10  Test Dose Result:  Negative

## 2025-04-09 ENCOUNTER — OFFICE VISIT (OUTPATIENT)
Dept: URGENT CARE | Facility: PHYSICIAN GROUP | Age: 40
End: 2025-04-09
Payer: COMMERCIAL

## 2025-04-09 ENCOUNTER — RESULTS FOLLOW-UP (OUTPATIENT)
Dept: URGENT CARE | Facility: PHYSICIAN GROUP | Age: 40
End: 2025-04-09

## 2025-04-09 VITALS
WEIGHT: 136.4 LBS | TEMPERATURE: 98.4 F | OXYGEN SATURATION: 98 % | BODY MASS INDEX: 24.17 KG/M2 | RESPIRATION RATE: 20 BRPM | DIASTOLIC BLOOD PRESSURE: 68 MMHG | HEIGHT: 63 IN | HEART RATE: 87 BPM | SYSTOLIC BLOOD PRESSURE: 102 MMHG

## 2025-04-09 DIAGNOSIS — U07.1 COVID-19 VIRUS INFECTION: ICD-10-CM

## 2025-04-09 LAB
FLUAV RNA SPEC QL NAA+PROBE: NEGATIVE
FLUBV RNA SPEC QL NAA+PROBE: NEGATIVE
RSV RNA SPEC QL NAA+PROBE: NEGATIVE
SARS-COV-2 RNA RESP QL NAA+PROBE: POSITIVE

## 2025-04-09 PROCEDURE — 99213 OFFICE O/P EST LOW 20 MIN: CPT | Performed by: PHYSICIAN ASSISTANT

## 2025-04-09 PROCEDURE — 3078F DIAST BP <80 MM HG: CPT | Performed by: PHYSICIAN ASSISTANT

## 2025-04-09 PROCEDURE — 0241U POCT CEPHEID COV-2, FLU A/B, RSV - PCR: CPT | Performed by: PHYSICIAN ASSISTANT

## 2025-04-09 PROCEDURE — 3074F SYST BP LT 130 MM HG: CPT | Performed by: PHYSICIAN ASSISTANT

## 2025-04-09 RX ORDER — PREDNISONE 10 MG/1
30 TABLET ORAL DAILY
Qty: 15 TABLET | Refills: 0 | Status: SHIPPED | OUTPATIENT
Start: 2025-04-09 | End: 2025-04-14

## 2025-04-09 ASSESSMENT — ENCOUNTER SYMPTOMS
EYE PAIN: 0
SINUS PAIN: 0
COUGH: 1
CONSTIPATION: 0
HEADACHES: 1
EYE REDNESS: 0
DIZZINESS: 0
VOMITING: 0
SHORTNESS OF BREATH: 0
EYE DISCHARGE: 0
SPUTUM PRODUCTION: 1
DIAPHORESIS: 0
FEVER: 0
NAUSEA: 0
SORE THROAT: 1
ABDOMINAL PAIN: 0
CHILLS: 0
WHEEZING: 0
DIARRHEA: 0

## 2025-04-09 ASSESSMENT — FIBROSIS 4 INDEX: FIB4 SCORE: 0.71

## 2025-04-09 NOTE — PROGRESS NOTES
"  Subjective:     Cinthia Alba  is a 39 y.o. female who presents for Nasal Congestion (X3 days with body aches, headache, fatigue, and loss of appetite.)       She presents today with nasal congestion, body aches, headache, fatigue, loss of appetite, productive cough and sore throat ongoing over the last 3 days.  Notes her child was sick recently with similar symptoms.  Currently denies any fevers, no chest pain or shortness of breath, no difficulties with breathing.  No nausea or vomiting, no abdominal pain, no diarrhea.  Has not used any medications for her symptoms.       Review of Systems   Constitutional:  Positive for malaise/fatigue. Negative for chills, diaphoresis and fever.   HENT:  Positive for congestion and sore throat. Negative for ear discharge and sinus pain.    Eyes:  Negative for pain, discharge and redness.   Respiratory:  Positive for cough and sputum production. Negative for shortness of breath and wheezing.    Cardiovascular:  Negative for chest pain.   Gastrointestinal:  Negative for abdominal pain, constipation, diarrhea, nausea and vomiting.   Neurological:  Positive for headaches. Negative for dizziness.      No Known Allergies  Past Medical History:   Diagnosis Date    Psychiatric problem         Objective:   /68   Pulse 87   Temp 36.9 °C (98.4 °F) (Temporal)   Resp 20   Ht 1.6 m (5' 3\")   Wt 61.9 kg (136 lb 6.4 oz)   SpO2 98%   BMI 24.16 kg/m²   Physical Exam  Vitals and nursing note reviewed.   Constitutional:       General: She is not in acute distress.     Appearance: Normal appearance. She is not ill-appearing, toxic-appearing or diaphoretic.   HENT:      Head: Normocephalic.      Right Ear: Tympanic membrane, ear canal and external ear normal. There is no impacted cerumen.      Left Ear: Tympanic membrane, ear canal and external ear normal. There is no impacted cerumen.      Nose: Congestion present. No rhinorrhea.      Mouth/Throat:      Mouth: Mucous " membranes are moist.      Pharynx: No oropharyngeal exudate or posterior oropharyngeal erythema.      Comments: No tonsillar swelling, bilaterally.  No soft tissue swelling of the sublingual mucosa, no swelling of the soft or hard palate, no unilarteral oral pharynx swelling, no uvular deviation.  Eyes:      General:         Right eye: No discharge.         Left eye: No discharge.      Conjunctiva/sclera: Conjunctivae normal.   Cardiovascular:      Rate and Rhythm: Normal rate and regular rhythm.   Pulmonary:      Effort: Pulmonary effort is normal. No respiratory distress.      Breath sounds: Normal breath sounds. No stridor. No wheezing, rhonchi or rales.   Musculoskeletal:      Cervical back: Neck supple.   Lymphadenopathy:      Cervical: No cervical adenopathy.   Neurological:      General: No focal deficit present.      Mental Status: She is alert and oriented to person, place, and time.   Psychiatric:         Mood and Affect: Mood normal.         Behavior: Behavior normal.         Thought Content: Thought content normal.         Judgment: Judgment normal.             Diagnostic testing:    Cephid COVID/Influenza/RSV -positive COVID-19, notified via [employer] message    Assessment/Plan:     Encounter Diagnosis   Name Primary?    COVID-19 virus infection          Plan for care for today's complaint includes tinea viral panel testing today, this was positive for COVID-19.  Start patient on prednisone for symptom support.  Encouraged use of over-the-counter medications for additional symptom relief.  Lung auscultation was normal today, no rales, wheezes, rhonchi.  Vital signs were stable during today's office visit, patient was overall well-appearing. Continue to monitor symptoms and return to urgent care or follow-up with primary care provider if symptoms remain ongoing.  Follow-up in the emergency department if symptoms become severe, ER precautions discussed in office today.   Prescription for prednisone  provided.    See AVS Instructions below for written guidance provided to patient on after-visit management and care in addition to our verbal discussion during the visit.    Please note that this dictation was created using voice recognition software. I have attempted to correct all errors, but there may be sound-alike, spelling, grammar and possibly content errors that I did not discover before finalizing the note.    Zenonmayra Moses PA-C

## 2025-04-12 ENCOUNTER — OFFICE VISIT (OUTPATIENT)
Dept: URGENT CARE | Facility: PHYSICIAN GROUP | Age: 40
End: 2025-04-12
Payer: COMMERCIAL

## 2025-04-12 VITALS
SYSTOLIC BLOOD PRESSURE: 112 MMHG | HEIGHT: 63 IN | HEART RATE: 74 BPM | BODY MASS INDEX: 24.18 KG/M2 | WEIGHT: 136.47 LBS | OXYGEN SATURATION: 99 % | DIASTOLIC BLOOD PRESSURE: 78 MMHG | TEMPERATURE: 99.1 F | RESPIRATION RATE: 14 BRPM

## 2025-04-12 DIAGNOSIS — H66.92 ACUTE LEFT OTITIS MEDIA: ICD-10-CM

## 2025-04-12 PROCEDURE — 99214 OFFICE O/P EST MOD 30 MIN: CPT | Performed by: NURSE PRACTITIONER

## 2025-04-12 PROCEDURE — 3074F SYST BP LT 130 MM HG: CPT | Performed by: NURSE PRACTITIONER

## 2025-04-12 PROCEDURE — 3078F DIAST BP <80 MM HG: CPT | Performed by: NURSE PRACTITIONER

## 2025-04-12 RX ORDER — AMOXICILLIN 875 MG/1
875 TABLET, COATED ORAL 2 TIMES DAILY
Qty: 14 TABLET | Refills: 0 | Status: SHIPPED | OUTPATIENT
Start: 2025-04-12 | End: 2025-04-19

## 2025-04-12 ASSESSMENT — FIBROSIS 4 INDEX: FIB4 SCORE: 0.71

## 2025-04-12 NOTE — PROGRESS NOTES
Chief Complaint   Patient presents with    Otalgia     Pt. Stated last night severe L ear pain radiating to jaw, loss of hearing, swimmer ears, requesting examination to rule out ruptured ear drum and viral swab for covid-19.       HISTORY OF PRESENT ILLNESS: Patient is a pleasant 39 y.o. female who presents today due to left-sided ear pain since last night.  She was diagnosed with COVID earlier this week.  She has been taking prednisone for symptom relief.  Over the past week she has had cough, congestion, malaise, fatigue.  Most of her symptoms have improved except for the new onset of left-sided ear pain.  Patient denies history of ear infections.     Patient Active Problem List    Diagnosis Date Noted    Postpartum hemorrhage 10/31/2022    Anemia associated with acute blood loss 10/31/2022    Labor and delivery indication for care or intervention 10/28/2022    Depression with anxiety 09/27/2018    Pure hypercholesterolemia 09/11/2017    Bruxism (teeth grinding) 02/09/2017    Hx of abnormal cervical Pap smear 02/09/2017       Allergies:Patient has no known allergies.    Current Outpatient Medications Ordered in Epic   Medication Sig Dispense Refill    amoxicillin (AMOXIL) 875 MG tablet Take 1 Tablet by mouth 2 times a day for 7 days. 14 Tablet 0    predniSONE (DELTASONE) 10 MG Tab Take 3 Tablets by mouth every day for 5 days. 15 Tablet 0    FLUoxetine (PROZAC) 20 MG Cap Take 30 mg by mouth every day.       No current [facility]-ordered facility-administered medications on file.       Past Medical History:   Diagnosis Date    Psychiatric problem        Social History     Tobacco Use    Smoking status: Never    Smokeless tobacco: Never   Vaping Use    Vaping status: Never Used   Substance Use Topics    Alcohol use: Not Currently    Drug use: No       Family Status   Relation Name Status    Mo  Alive    Fa  Alive    Sis  Alive   No partnership data on file     Family History   Problem Relation Age of Onset    Psychiatric  "Illness Mother     Hypertension Father     Cancer Father 60        melanoma    No Known Problems Sister        ROS:  Review of Systems   Constitutional: Positive for fatigue, malaise. Negative for weight loss.  HENT: Positive for congestion, ear pain. Negative for nosebleeds, and neck pain.    Eyes: Negative for vision changes.   Cardiovascular: Negative for chest pain, palpitations, orthopnea and leg swelling.   Respiratory: Positive for cough and sputum production. Negative for shortness of breath and wheezing.   Gastrointestinal: Negative for abdominal pain, nausea, vomiting or diarrhea.   Skin: Negative for rash, diaphoresis.     Exam:  /78 (BP Location: Left arm, Patient Position: Sitting, BP Cuff Size: Adult long)   Pulse 74   Temp 37.3 °C (99.1 °F) (Temporal)   Resp 14   Ht 1.6 m (5' 3\")   Wt 61.9 kg (136 lb 7.4 oz)   SpO2 99%   General: well-nourished, well-developed female in NAD  Head: normocephalic, atraumatic  Eyes: PERRLA, EOM within normal limits, no conjunctival injection, no scleral icterus, visual fields and acuity grossly intact.  Ears: normal shape and symmetry, no tenderness, no discharge. External canals are without any significant edema or erythema.  Right tympanic membrane is without any inflammation, no effusion.  Left tympanic membrane is erythematous, bulging, injected, intact.  Gross auditory acuity is intact.  Nose: symmetrical without tenderness, mild discharge, erythema present bilateral nares.  Mouth/Throat: reasonable hygiene, no exudates or tonsillar enlargement. Mild erythema present.   Neck: no masses, range of motion within normal limits, no tracheal deviation.  Lymph: mild cervical adenopathy. No supraclavicular adenopathy.   Neuro: alert and oriented. Cranial nerves 1-12 grossly intact.   Cardiovascular: regular rate and rhythm without murmurs, rubs, or gallops. No edema.   Pulmonary: no distress. Chest is symmetrical with respiration. No wheezes, crackles, or " rhonchi.   Musculoskeletal: appropriate muscle tone, gait is stable.  Skin: warm, dry, intact, no clubbing, no cyanosis.   Psych: appropriate mood, affect, judgement.         Assessment/Plan:  1. Acute left otitis media  amoxicillin (AMOXIL) 875 MG tablet              The patient is a pleasant 39-year-old who presents with left-sided otitis media and recent COVID infection.  COVID symptoms appear to be improving.  Patient placed on amoxicillin today for otitis media.  She is encouraged to use OTC Flonase for symptom relief. Rest, increase fluids, hand and respiratory hygiene.   Supportive care, differential diagnoses, and indications for immediate follow-up discussed with patient.   Pathogenesis of diagnosis discussed including typical length and natural progression.  Instructed to return to clinic or nearest emergency department for any change in condition, further concerns, or worsening of symptoms.  Patient states understanding of the plan of care and discharge instructions.  Instructed to make an appointment with her primary care provider in the next 3-5 days if not significantly improving and for further care.         Please note that this dictation was created using voice recognition software. I have made every reasonable attempt to correct obvious errors, but I expect that there are errors of grammar and possibly content that I did not discover before finalizing the note. Previous clinic visit encounter reviewed and considered in medical decision making today.         BEATA Zamora.